# Patient Record
Sex: MALE | Race: WHITE | Employment: OTHER | URBAN - METROPOLITAN AREA
[De-identification: names, ages, dates, MRNs, and addresses within clinical notes are randomized per-mention and may not be internally consistent; named-entity substitution may affect disease eponyms.]

---

## 2018-12-12 ENCOUNTER — HOSPITAL ENCOUNTER (EMERGENCY)
Age: 73
Discharge: HOME | End: 2018-12-12
Attending: EMERGENCY MEDICINE
Payer: MEDICARE

## 2018-12-12 VITALS
BODY MASS INDEX: 32.55 KG/M2 | DIASTOLIC BLOOD PRESSURE: 75 MMHG | SYSTOLIC BLOOD PRESSURE: 136 MMHG | RESPIRATION RATE: 18 BRPM | TEMPERATURE: 99.6 F | WEIGHT: 240 LBS | OXYGEN SATURATION: 91 % | HEART RATE: 94 BPM

## 2018-12-12 DIAGNOSIS — R19.7 NAUSEA VOMITING AND DIARRHEA: Primary | ICD-10-CM

## 2018-12-12 DIAGNOSIS — R11.2 NAUSEA VOMITING AND DIARRHEA: Primary | ICD-10-CM

## 2018-12-12 LAB
ALBUMIN SERPL-MCNC: 3.9 G/DL (ref 3.5–5.2)
ALP BLD-CCNC: 93 U/L (ref 40–129)
ALT SERPL-CCNC: 39 U/L (ref 0–40)
ANION GAP SERPL CALCULATED.3IONS-SCNC: 16 MMOL/L (ref 7–16)
AST SERPL-CCNC: 41 U/L (ref 0–39)
BASOPHILS ABSOLUTE: 0 E9/L (ref 0–0.2)
BASOPHILS RELATIVE PERCENT: 0.3 % (ref 0–2)
BILIRUB SERPL-MCNC: 1.5 MG/DL (ref 0–1.2)
BUN BLDV-MCNC: 38 MG/DL (ref 8–23)
CALCIUM SERPL-MCNC: 9.5 MG/DL (ref 8.6–10.2)
CHLORIDE BLD-SCNC: 102 MMOL/L (ref 98–107)
CO2: 19 MMOL/L (ref 22–29)
CREAT SERPL-MCNC: 1.9 MG/DL (ref 0.7–1.2)
EOSINOPHILS ABSOLUTE: 0 E9/L (ref 0.05–0.5)
EOSINOPHILS RELATIVE PERCENT: 0.7 % (ref 0–6)
GFR AFRICAN AMERICAN: 42
GFR NON-AFRICAN AMERICAN: 35 ML/MIN/1.73
GLUCOSE BLD-MCNC: 132 MG/DL (ref 74–99)
HCT VFR BLD CALC: 54.7 % (ref 37–54)
HEMOGLOBIN: 18.4 G/DL (ref 12.5–16.5)
INFLUENZA A BY PCR: NOT DETECTED
INFLUENZA B BY PCR: NOT DETECTED
LACTIC ACID: 1.2 MMOL/L (ref 0.5–2.2)
LIPASE: 42 U/L (ref 13–60)
LYMPHOCYTES ABSOLUTE: 0.09 E9/L (ref 1.5–4)
LYMPHOCYTES RELATIVE PERCENT: 0.9 % (ref 20–42)
MCH RBC QN AUTO: 32.2 PG (ref 26–35)
MCHC RBC AUTO-ENTMCNC: 33.6 % (ref 32–34.5)
MCV RBC AUTO: 95.6 FL (ref 80–99.9)
MONOCYTES ABSOLUTE: 0.26 E9/L (ref 0.1–0.95)
MONOCYTES RELATIVE PERCENT: 2.6 % (ref 2–12)
NEUTROPHILS ABSOLUTE: 8.44 E9/L (ref 1.8–7.3)
NEUTROPHILS RELATIVE PERCENT: 96.5 % (ref 43–80)
PDW BLD-RTO: 13.9 FL (ref 11.5–15)
PLATELET # BLD: 116 E9/L (ref 130–450)
PMV BLD AUTO: 11.4 FL (ref 7–12)
POTASSIUM REFLEX MAGNESIUM: 4.3 MMOL/L (ref 3.5–5)
RBC # BLD: 5.72 E12/L (ref 3.8–5.8)
SODIUM BLD-SCNC: 137 MMOL/L (ref 132–146)
TOTAL PROTEIN: 7.9 G/DL (ref 6.4–8.3)
TROPONIN: 0.05 NG/ML (ref 0–0.03)
WBC # BLD: 8.7 E9/L (ref 4.5–11.5)

## 2018-12-12 PROCEDURE — 83605 ASSAY OF LACTIC ACID: CPT

## 2018-12-12 PROCEDURE — 96374 THER/PROPH/DIAG INJ IV PUSH: CPT

## 2018-12-12 PROCEDURE — 84484 ASSAY OF TROPONIN QUANT: CPT

## 2018-12-12 PROCEDURE — 2580000003 HC RX 258: Performed by: EMERGENCY MEDICINE

## 2018-12-12 PROCEDURE — 6360000002 HC RX W HCPCS: Performed by: EMERGENCY MEDICINE

## 2018-12-12 PROCEDURE — 83690 ASSAY OF LIPASE: CPT

## 2018-12-12 PROCEDURE — 6370000000 HC RX 637 (ALT 250 FOR IP): Performed by: EMERGENCY MEDICINE

## 2018-12-12 PROCEDURE — 85025 COMPLETE CBC W/AUTO DIFF WBC: CPT

## 2018-12-12 PROCEDURE — 80053 COMPREHEN METABOLIC PANEL: CPT

## 2018-12-12 PROCEDURE — 99284 EMERGENCY DEPT VISIT MOD MDM: CPT

## 2018-12-12 PROCEDURE — 36415 COLL VENOUS BLD VENIPUNCTURE: CPT

## 2018-12-12 PROCEDURE — 87502 INFLUENZA DNA AMP PROBE: CPT

## 2018-12-12 RX ORDER — ACETAMINOPHEN 500 MG
1000 TABLET ORAL ONCE
Status: COMPLETED | OUTPATIENT
Start: 2018-12-12 | End: 2018-12-12

## 2018-12-12 RX ORDER — ONDANSETRON 2 MG/ML
4 INJECTION INTRAMUSCULAR; INTRAVENOUS ONCE
Status: COMPLETED | OUTPATIENT
Start: 2018-12-12 | End: 2018-12-12

## 2018-12-12 RX ORDER — ONDANSETRON 4 MG/1
4 TABLET, ORALLY DISINTEGRATING ORAL EVERY 8 HOURS PRN
Qty: 10 TABLET | Refills: 0 | Status: SHIPPED | OUTPATIENT
Start: 2018-12-12 | End: 2021-06-24 | Stop reason: ALTCHOICE

## 2018-12-12 RX ORDER — 0.9 % SODIUM CHLORIDE 0.9 %
1000 INTRAVENOUS SOLUTION INTRAVENOUS ONCE
Status: COMPLETED | OUTPATIENT
Start: 2018-12-12 | End: 2018-12-12

## 2018-12-12 RX ADMIN — ONDANSETRON 4 MG: 2 INJECTION INTRAMUSCULAR; INTRAVENOUS at 21:22

## 2018-12-12 RX ADMIN — ACETAMINOPHEN 1000 MG: 500 TABLET, FILM COATED ORAL at 21:22

## 2018-12-12 RX ADMIN — LIDOCAINE HYDROCHLORIDE: 20 SOLUTION ORAL; TOPICAL at 21:53

## 2018-12-12 RX ADMIN — SODIUM CHLORIDE 1000 ML: 9 INJECTION, SOLUTION INTRAVENOUS at 21:22

## 2018-12-12 ASSESSMENT — ENCOUNTER SYMPTOMS
DIARRHEA: 1
SINUS PRESSURE: 0
SINUS PAIN: 0
CONSTIPATION: 0
NAUSEA: 1
SHORTNESS OF BREATH: 0
BACK PAIN: 0
SORE THROAT: 0
ABDOMINAL PAIN: 1
VOMITING: 1
COUGH: 0

## 2018-12-12 ASSESSMENT — PAIN SCALES - GENERAL: PAINLEVEL_OUTOF10: 6

## 2018-12-13 NOTE — ED PROVIDER NOTES
Patient is a 66-year-old male presenting to the emergency department for crampy abdominal pain, nausea, vomiting, and diarrhea. He states that the symptoms all began today. He and his wife both have the same symptoms. They're unsure if there has been any suspicious food intake. He denies any fever but does state that he has had some chills and generalized body aches that the day today. He denies any changes in urination. He denies any cough, shortness of breath, or chest pain. He denies any runny nose, congestion, or sore throat. The history is provided by the patient. Review of Systems   Constitutional: Positive for chills and fatigue. Negative for fever. HENT: Negative for congestion, postnasal drip, sinus pain, sinus pressure and sore throat. Respiratory: Negative for cough and shortness of breath. Cardiovascular: Negative for chest pain and palpitations. Gastrointestinal: Positive for abdominal pain, diarrhea, nausea and vomiting. Negative for constipation. Genitourinary: Negative for decreased urine volume, dysuria and hematuria. Musculoskeletal: Negative for back pain and neck pain. Skin: Negative for pallor, rash and wound. Neurological: Negative for dizziness, syncope, weakness, light-headedness, numbness and headaches. Physical Exam   Constitutional: He is oriented to person, place, and time. He appears well-developed and well-nourished. No distress. HENT:   Head: Normocephalic and atraumatic. Mouth/Throat: Oropharynx is clear and moist. No oropharyngeal exudate. Eyes: Pupils are equal, round, and reactive to light. Conjunctivae and EOM are normal.   Neck: Normal range of motion. Neck supple. Cardiovascular: Normal rate, regular rhythm and normal heart sounds. Exam reveals no gallop and no friction rub. No murmur heard. Pulmonary/Chest: Effort normal and breath sounds normal. No respiratory distress. He has no wheezes. He has no rales. Abdominal: Soft. Bowel sounds are normal. There is tenderness. There is no rebound and no guarding. Generalized mild abdominal tenderness to palpation without localization. No guarding, rebound, or rigidity. Hyperactiveactive bowel sounds in all 4 quadrants. Musculoskeletal: He exhibits no edema, tenderness or deformity. Neurological: He is alert and oriented to person, place, and time. Skin: Skin is warm and dry. No rash noted. He is not diaphoretic. No erythema. No pallor. Nursing note and vitals reviewed. Procedures    MDM  Number of Diagnoses or Management Options  Diagnosis management comments: Patient presents with crampy abdominal pain, nausea, vomiting, and diarrhea beginning today. He has wife had the same symptoms but is likely that this is an infectious gastroenteritis. Patient is afebrile and hemodynamically stable. Patient does not necessitate abdominal imaging at this time. He will have baseline blood work drawn and be given IV fluid hydration, Zofran, and Tylenol. ED Course as of Dec 12 2215   Wed Dec 12, 2018   2214 Patient reassessed. He states that he feels significantly better compared to arrival. He has had no vomiting or diarrhea since arrival. Explained his blood work is reassuring other than a slight elevation in his creatinine which is likely secondary to volume depletion. Patient did receive 1 L normal saline. Stressed the importance of taking in plenty of fluids by mouth to keep up with the losses from vomiting and diarrhea. Patient will be discharged with Zofran and instructed him on the warning signs and symptoms for which she should immediately return to emergency apparent. He voices an understanding and is agreeable with this plan.  He is in no acute distress, has no abdominal pain whatsoever at this time, and is hemodynamically stable for discharge.  [BM]      ED Course User Index  [BM] Ofilia Severance, DO       --------------------------------------------- PAST HISTORY process requiring hospitalization or inpatient management. They have remained hemodynamically stable throughout their entire ED visit and are stable for discharge with outpatient follow-up. The plan has been discussed in detail and they are aware of the specific conditions for emergent return, as well as the importance of follow-up. New Prescriptions    ONDANSETRON (ZOFRAN ODT) 4 MG DISINTEGRATING TABLET    Take 1 tablet by mouth every 8 hours as needed for Nausea or Vomiting       Diagnosis:  1. Nausea vomiting and diarrhea        Disposition:  Patient's disposition: Discharge to home  Patient's condition is stable. ATTENDING PROVIDER ATTESTATION:     Ernesto Silva presented to the emergency department for evaluation of Influenza (started today with N/V/D)    I have reviewed and discussed the case, including pertinent history (medical, surgical, family and social) and exam findings with the Resident and the Nurse assigned to Ernesto Silva. I have personally performed and/or participated in the history, exam, medical decision making, and procedures and agree with all pertinent clinical information. I have reviewed my findings and recommendations with Ernesto Silva and members of family present at the time of disposition. MDM: Supportive care, will obtain appropriate labs and imaging to assess patient's Influenza (started today with N/V/D)       My findings/plan: There were no encounter diagnoses.   New Prescriptions    No medications on file     Linda Russo, [address] [facility],   Resident  12/12/18 [id number]

## 2020-03-03 VITALS
WEIGHT: 247.12 LBS | BODY MASS INDEX: 34.6 KG/M2 | HEIGHT: 71 IN | SYSTOLIC BLOOD PRESSURE: 144 MMHG | HEART RATE: 64 BPM | DIASTOLIC BLOOD PRESSURE: 80 MMHG

## 2020-03-03 RX ORDER — PROBENECID 500 MG/1
500 TABLET, FILM COATED ORAL DAILY
COMMUNITY
End: 2020-10-23

## 2020-03-03 RX ORDER — HYDROCHLOROTHIAZIDE 12.5 MG/1
12.5 CAPSULE, GELATIN COATED ORAL DAILY
Status: ON HOLD | COMMUNITY
End: 2022-02-08 | Stop reason: HOSPADM

## 2020-09-15 RX ORDER — CLOPIDOGREL BISULFATE 75 MG/1
75 TABLET ORAL DAILY
Qty: 90 TABLET | Refills: 3 | Status: ON HOLD
Start: 2020-09-15 | End: 2021-06-30 | Stop reason: HOSPADM

## 2020-10-22 NOTE — PROGRESS NOTES
[facility] Cardiology Progress Note  Dr. Jose Roldan      Referring Physician: Philip Wilson DO  CHIEF COMPLAINT:   Chief Complaint   Patient presents with    Coronary Artery Disease     annual follow-up. Patient denies any cp sob or dizziness. HISTORY OF PRESENT ILLNESS:   Patient  is 76years old male with PCI to LAD, HTN, hyperlipidemia, thyroid disease, and pulmonary embolism after back surgery is here for follow up appointment. Patient denies any chest pain, no shortness of breath, no lightheadedness, no dizziness, no palpitations, no pedal edema, no PND, no orthopnea, no syncope, no presyncopal episodes.   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       Past Medical History:   Diagnosis Date    Bilateral renal cysts 10/13/2014    CAD (coronary artery disease)     Cardiomyopathy ([facility] [address]     Chest pain     8-4-2016 lexiscan stress    History of gout 10/13/2014    History of pulmonary embolism 10/13/2014    Hyperlipidemia     Hypertension     Hyperthyroidism 10/13/2014    NSTEMI (non-ST elevated myocardial infarction) ([address] 10/13/2014    Obesity due to excess calories     Thyroid cyst 10/13/2014    Thyroid disease          Past Surgical History:   Procedure Laterality Date    BACK SURGERY      CORONARY ANGIOPLASTY WITH STENT PLACEMENT  10-    Dr. Levi Rodgers 3.0x18 MID LAD    DIAGNOSTIC CARDIAC CATH LAB PROCEDURE  10/13/2014    Dr. Faviola Vega: PCI to LAD         Current Outpatient Medications   Medication Sig Dispense Refill    colchicine-probenecid 0.5-500 MG per tablet TAKE ONE TABLET BY MOUTH EVERY DAY      clopidogrel (PLAVIX) 75 MG tablet Take 1 tablet by mouth daily 90 tablet 3    hydrochlorothiazide (MICROZIDE) 12.5 MG capsule Take 12.5 mg by mouth daily      ondansetron (ZOFRAN ODT) 4 MG disintegrating tablet Take 1 tablet by mouth every 8 hours as needed for Nausea or Vomiting 10 tablet 0    lisinopril (PRINIVIL;ZESTRIL) 20 MG tablet Take 1 tablet by mouth daily 30 tablet 3    metoprolol succinate ER (TOPROL-XL) 100 MG XL tablet Take 100 mg by mouth daily      nitroGLYCERIN (NITROSTAT) 0.4 MG SL tablet Place 1 tablet under the tongue every 5 minutes as needed for Chest pain. 25 tablet 3    atorvastatin (LIPITOR) 80 MG tablet Take 1 tablet by mouth daily. (Patient taking differently: Take 80 mg by mouth nightly ) 30 tablet 3    methimazole (TAPAZOLE) 5 MG tablet Take 7.5 mg by mouth daily. 1.5 tabs      allopurinol (ZYLOPRIM) 300 MG tablet Take 300 mg by mouth daily. No current facility-administered medications for this visit.           Allergies as of 10/23/2020    (No Known Allergies)       Social History     Socioeconomic History    Marital status:      Spouse name: Not on file    Number of children: Not on file    Years of education: Not on file    Highest education level: Not on file   Occupational History    Not on file   Social Needs    Financial resource strain: Not on file    Food insecurity     Worry: Not on file     Inability: Not on file    Transportation needs     Medical: Not on file     Non-medical: Not on file   Tobacco Use    Smoking status: Never Smoker    Smokeless tobacco: Never Used   Substance and Sexual Activity    Alcohol use: Yes     Comment: occasional. No caffeine    Drug use: No    Sexual activity: Not on file   Lifestyle    Physical activity     Days per week: Not on file     Minutes per session: Not on file    Stress: Not on file   Relationships    Social connections     Talks on phone: Not on file     Gets together: Not on file     Attends Worship service: Not on file     Active member of club or organization: Not on file     Attends meetings of clubs or organizations: Not on file     Relationship status: Not on file    Intimate partner violence     Fear of current or ex partner: Not on file     Emotionally abused: Not on file     Physically abused: Not on file     Forced sexual activity: Not on file   Other Topics Concern    Not on file   Social History Narrative    Not on file       Family History   Problem Relation Age of Onset    Heart Disease Mother         cabg 3    Heart Disease Father         pacemaker       REVIEW OF SYSTEMS:     CONSTITUTIONAL:  negative for  fevers, chills, sweats and fatigue  HEENT:  negative for  tinnitus, earaches, nasal congestion and epistaxis  RESPIRATORY:  negative for  dry cough, cough with sputum, dyspnea, wheezing and hemoptysis  GASTROINTESTINAL:  negative for nausea, vomiting, diarrhea, constipation, pruritus and jaundice  HEMATOLOGIC/LYMPHATIC:  negative for easy bruising, bleeding, lymphadenopathy and petechiae  ENDOCRINE:  negative for heat intolerance, cold intolerance, tremor, hair loss and diabetic symptoms including neither polyuria nor polydipsia nor blurred vision  MUSCULOSKELETAL:  negative for  myalgias, arthralgias, joint swelling, stiff joints and decreased range of motion  NEUROLOGICAL:  negative for memory problems, speech problems, visual disturbance, dysphagia, weakness and numbness      PHYSICAL EXAM:   Constitutional:  Awake, alert cooperative, no apparent distress, and appears stated age. HEENT:  Moist and pink mucous membranes, normocephalic, without obvious abnormality, atraumatic, normal ears and nose. Neck:  Supple, symmetrical, trachea midline, no JVD, no adenopathy, thyroid symmetric, not enlarged and no tenderness, good carotid upstroke bilaterally, no carotid bruit, skin normal  Lungs: No increased work of breathing, good air exchange, clear to auscultation bilaterally, no crackles or wheezing. Cardiovascular: Normal apical impulse, regular rate and rhythm, normal S1 and S2, no S3 or S4, no murmur, no pedal edema, good carotid upstroke bilaterally, no carotid bruit, no JVD, no abdominal pulsating masses. Abdomen: Soft, nontender, no hepatomegaly, no splenomegaly, bowel sound positive.    CHEST:  Expands symmetrically, nontender to palpation. Musculoskeletal:  No clubbing or cyanosis. No redness, warmth, or swelling of the joints. Neurological: Alert, awake, and oriented X3. /70 (Site: Right Upper Arm, Position: Sitting, Cuff Size: Medium Adult)   Pulse 67   Ht 5' 11\" (1.803 m)   Wt 248 lb (112.5 kg)   BMI 34.59 kg/m²     DATA:   I personally reviewed the visit EKG with the following interpretation: Sinus rhythm, nonspecific ST changes in lateral leads    EKG - (9/19/2019) I personally reviewed the EKG with the following interpretation: Sinus bradycardia, first-degree AV block    ECHO: 11/16/15 Normal left ventricular systolic function Stage I diastolic dysfunction EF 64% Mild mitral regurgitation Trace tricuspid regurgitation    Stress Test: 8/4/16   Technique:    The patient received 12.97 mCi of Cardiolite for the resting study,    followed by standard SPECT imaging . The patient was stressed    pharmacologically. Then, 35.1 mCi of Cardiolite was injected with    repeat comparison imaging.         Findings:    Stress induced ischemia is not demonstrated. Angiography: 10/13/14 IMPRESSION:   1. Subtotal occlusion of mid LAD with successful balloon angioplasty and      deployment of 3.0 x 18 mm Expedition drug-eluting stent with 0% residual      stenosis and mild pinching of the ostium of the first diagonal branch      estimated at 20% to 30%.    2.    Successful deployment of 8-Chinese Angio-Seal in the right common      femoral artery  Cardiology Labs: BMP:    Lab Results   Component Value Date     12/12/2018    K 4.3 12/12/2018     12/12/2018    CO2 19 12/12/2018    BUN 38 12/12/2018    CREATININE 1.9 12/12/2018     CMP:    Lab Results   Component Value Date     12/12/2018    K 4.3 12/12/2018     12/12/2018    CO2 19 12/12/2018    BUN 38 12/12/2018    CREATININE 1.9 12/12/2018    PROT 7.9 12/12/2018     CBC:    Lab Results   Component Value Date    WBC 8.7 12/12/2018    RBC 5.72 12/12/2018    HGB 18.4 12/12/2018    HCT 54.7 12/12/2018    MCV 95.6 12/12/2018    RDW 13.9 12/12/2018     12/12/2018     PT/INR:  No results found for: PTINR  PT/INR Warfarin:  No components found for: PTPATWAR, PTINRWAR  PTT:    Lab Results   Component Value Date    APTT 30.2 08/03/2016     PTT Heparin:  No components found for: APTTHEP  Magnesium:    Lab Results   Component Value Date    MG 1.8 10/14/2014     TSH:    Lab Results   Component Value Date    TSH 2.570 08/04/2016     TROPONIN:  No components found for: TROP  BNP:  No results found for: BNP  FASTING LIPID PANEL:    Lab Results   Component Value Date    CHOL 168 08/04/2016    HDL 48 08/04/2016    TRIG 187 08/04/2016     No orders to display     I have personally reviewed the laboratory, cardiac diagnostic and radiographic testing as outlined above:      IMPRESSION:  1: CAD: Status post-PCI to LAD in October 2014 using a 3.0×18 mm drug-eluting stent, doing fine and will continue current treatment. 2: Essential (primary) hypertension: Controlled, continue current medications. 3:  Chronic kidney disease, stage 3 (moderate)                4:  Hyperlipidemia: On Statin                  5:  Personal history of pulmonary embolism                RECOMMENDATIONS:   1. Continue current treatment  2. Preventive cardiology: Low-salt, low-cholesterol diet, daily exercise, total cholesterol of less than 200, LDL of less than 70,were all advised. 3.  Follow-up with Dr. Patrick Hernandez as scheduled  4. Follow-up with Dr. Ailyn David in 1 year, sooner if symptomatic for any reason    I have reviewed my findings and recommendations with patient    Electronically signed by David Morales MD on 10/23/2020 at 5:31 PM    NOTE: This report was transcribed using voice recognition software.  Every effort was made to ensure accuracy; however, inadvertent computerized transcription errors may be present

## 2020-10-23 ENCOUNTER — OFFICE VISIT (OUTPATIENT)
Dept: CARDIOLOGY CLINIC | Age: 75
End: 2020-10-23
Payer: MEDICARE

## 2020-10-23 VITALS
BODY MASS INDEX: 34.72 KG/M2 | DIASTOLIC BLOOD PRESSURE: 70 MMHG | HEIGHT: 71 IN | HEART RATE: 67 BPM | WEIGHT: 248 LBS | SYSTOLIC BLOOD PRESSURE: 124 MMHG

## 2020-10-23 PROCEDURE — 99214 OFFICE O/P EST MOD 30 MIN: CPT | Performed by: INTERNAL MEDICINE

## 2020-10-23 PROCEDURE — 93000 ELECTROCARDIOGRAM COMPLETE: CPT | Performed by: INTERNAL MEDICINE

## 2020-10-23 RX ORDER — PROBENECID AND COLCHICINE 500; .5 MG/1; MG/1
TABLET ORAL
COMMUNITY
Start: 2020-10-07

## 2021-06-24 ENCOUNTER — APPOINTMENT (OUTPATIENT)
Dept: CT IMAGING | Age: 76
DRG: 026 | End: 2021-06-24
Payer: MEDICARE

## 2021-06-24 ENCOUNTER — HOSPITAL ENCOUNTER (INPATIENT)
Age: 76
LOS: 6 days | Discharge: HOME HEALTH | DRG: 026 | End: 2021-06-30
Attending: EMERGENCY MEDICINE | Admitting: SURGERY
Payer: MEDICARE

## 2021-06-24 ENCOUNTER — APPOINTMENT (OUTPATIENT)
Dept: GENERAL RADIOLOGY | Age: 76
End: 2021-06-24
Payer: MEDICARE

## 2021-06-24 ENCOUNTER — APPOINTMENT (OUTPATIENT)
Dept: CT IMAGING | Age: 76
End: 2021-06-24
Payer: MEDICARE

## 2021-06-24 ENCOUNTER — ANESTHESIA EVENT (OUTPATIENT)
Dept: OPERATING ROOM | Age: 76
DRG: 026 | End: 2021-06-24
Payer: MEDICARE

## 2021-06-24 ENCOUNTER — HOSPITAL ENCOUNTER (EMERGENCY)
Age: 76
Discharge: ANOTHER HOSPITAL | End: 2021-06-24
Attending: EMERGENCY MEDICINE
Payer: MEDICARE

## 2021-06-24 VITALS
RESPIRATION RATE: 16 BRPM | WEIGHT: 248 LBS | TEMPERATURE: 98.4 F | SYSTOLIC BLOOD PRESSURE: 112 MMHG | DIASTOLIC BLOOD PRESSURE: 75 MMHG | BODY MASS INDEX: 33.59 KG/M2 | HEIGHT: 72 IN | OXYGEN SATURATION: 95 % | HEART RATE: 77 BPM

## 2021-06-24 DIAGNOSIS — I62.03 ACUTE ON CHRONIC INTRACRANIAL SUBDURAL HEMATOMA (HCC): Primary | ICD-10-CM

## 2021-06-24 DIAGNOSIS — I62.01 ACUTE ON CHRONIC INTRACRANIAL SUBDURAL HEMATOMA (HCC): Primary | ICD-10-CM

## 2021-06-24 DIAGNOSIS — S06.5XAA SDH (SUBDURAL HEMATOMA): Primary | ICD-10-CM
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
## 2021-06-24 LAB
% INHIBITION AA: 55.7 %
% INHIBITION ADP: 78 %
ALBUMIN SERPL-MCNC: 3.4 G/DL (ref 3.5–5.2)
ALP BLD-CCNC: 85 U/L (ref 40–129)
ALT SERPL-CCNC: 20 U/L (ref 0–40)
ANGLE (CLOT STRENGTH): 72.7 DEGREE (ref 59–74)
ANION GAP SERPL CALCULATED.3IONS-SCNC: 13 MMOL/L (ref 7–16)
APTT: 29.2 SEC (ref 24.5–35.1)
AST SERPL-CCNC: 25 U/L (ref 0–39)
BACTERIA: ABNORMAL /HPF
BASOPHILS ABSOLUTE: 0.02 E9/L (ref 0–0.2)
BASOPHILS RELATIVE PERCENT: 0.3 % (ref 0–2)
BILIRUB SERPL-MCNC: 1.2 MG/DL (ref 0–1.2)
BILIRUBIN URINE: NEGATIVE
BLOOD, URINE: ABNORMAL
BUN BLDV-MCNC: 33 MG/DL (ref 6–23)
CALCIUM SERPL-MCNC: 9.6 MG/DL (ref 8.6–10.2)
CHLORIDE BLD-SCNC: 102 MMOL/L (ref 98–107)
CLARITY: CLEAR
CO2: 23 MMOL/L (ref 22–29)
COLOR: ABNORMAL
CREAT SERPL-MCNC: 1.7 MG/DL (ref 0.7–1.2)
EOSINOPHILS ABSOLUTE: 0.14 E9/L (ref 0.05–0.5)
EOSINOPHILS RELATIVE PERCENT: 1.8 % (ref 0–6)
EPL-TEG: 0.8 % (ref 0–15)
G-TEG: 9.8 K D/SC (ref 4.5–11)
GFR AFRICAN AMERICAN: 48
GFR NON-AFRICAN AMERICAN: 39 ML/MIN/1.73
GLUCOSE BLD-MCNC: 99 MG/DL (ref 74–99)
GLUCOSE URINE: NEGATIVE MG/DL
HCT VFR BLD CALC: 49.7 % (ref 37–54)
HEMOGLOBIN: 16.6 G/DL (ref 12.5–16.5)
IMMATURE GRANULOCYTES #: 0.05 E9/L
IMMATURE GRANULOCYTES %: 0.7 % (ref 0–5)
INR BLD: 1.3
K (CLOTTING TIME): 1.2 MIN (ref 1–3)
KETONES, URINE: NEGATIVE MG/DL
LACTIC ACID: 0.9 MMOL/L (ref 0.5–2.2)
LEUKOCYTE ESTERASE, URINE: NEGATIVE
LY30 (FIBRINOLYSIS): 0.8 % (ref 0–8)
LYMPHOCYTES ABSOLUTE: 0.66 E9/L (ref 1.5–4)
LYMPHOCYTES RELATIVE PERCENT: 8.6 % (ref 20–42)
MA (MAX AMPLITUDE): 66.3 MM (ref 50–70)
MA-AA: 38 MM
MA-ACTIVATED: 15.5 MM
MA-ADP: 26.7 MM
MA-TEG BASELINE: 66.3 MM
MCH RBC QN AUTO: 32.3 PG (ref 26–35)
MCHC RBC AUTO-ENTMCNC: 33.4 % (ref 32–34.5)
MCV RBC AUTO: 96.7 FL (ref 80–99.9)
MONOCYTES ABSOLUTE: 0.82 E9/L (ref 0.1–0.95)
MONOCYTES RELATIVE PERCENT: 10.7 % (ref 2–12)
NEUTROPHILS ABSOLUTE: 5.99 E9/L (ref 1.8–7.3)
NEUTROPHILS RELATIVE PERCENT: 77.9 % (ref 43–80)
NITRITE, URINE: NEGATIVE
PDW BLD-RTO: 13.6 FL (ref 11.5–15)
PH UA: 5.5 (ref 5–9)
PLATELET # BLD: 142 E9/L (ref 130–450)
PMV BLD AUTO: 10.9 FL (ref 7–12)
POTASSIUM REFLEX MAGNESIUM: 4 MMOL/L (ref 3.5–5)
PROTEIN UA: 100 MG/DL
PROTHROMBIN TIME: 14.8 SEC (ref 9.3–12.4)
R (REACTION TIME): 3.6 MIN (ref 5–10)
RBC # BLD: 5.14 E12/L (ref 3.8–5.8)
RBC UA: ABNORMAL /HPF (ref 0–2)
SARS-COV-2, NAAT: NOT DETECTED
SODIUM BLD-SCNC: 138 MMOL/L (ref 132–146)
SPECIFIC GRAVITY UA: 1.02 (ref 1–1.03)
TOTAL PROTEIN: 6.8 G/DL (ref 6.4–8.3)
TROPONIN, HIGH SENSITIVITY: 99 NG/L (ref 0–11)
UROBILINOGEN, URINE: 1 E.U./DL
WBC # BLD: 7.7 E9/L (ref 4.5–11.5)
WBC UA: ABNORMAL /HPF (ref 0–5)

## 2021-06-24 PROCEDURE — 70450 CT HEAD/BRAIN W/O DYE: CPT

## 2021-06-24 PROCEDURE — 85347 COAGULATION TIME ACTIVATED: CPT

## 2021-06-24 PROCEDURE — 87088 URINE BACTERIA CULTURE: CPT

## 2021-06-24 PROCEDURE — 85576 BLOOD PLATELET AGGREGATION: CPT

## 2021-06-24 PROCEDURE — 36415 COLL VENOUS BLD VENIPUNCTURE: CPT

## 2021-06-24 PROCEDURE — 85025 COMPLETE CBC W/AUTO DIFF WBC: CPT

## 2021-06-24 PROCEDURE — 99284 EMERGENCY DEPT VISIT MOD MDM: CPT

## 2021-06-24 PROCEDURE — 84484 ASSAY OF TROPONIN QUANT: CPT

## 2021-06-24 PROCEDURE — 81001 URINALYSIS AUTO W/SCOPE: CPT

## 2021-06-24 PROCEDURE — 85730 THROMBOPLASTIN TIME PARTIAL: CPT

## 2021-06-24 PROCEDURE — 2000000000 HC ICU R&B

## 2021-06-24 PROCEDURE — 71046 X-RAY EXAM CHEST 2 VIEWS: CPT

## 2021-06-24 PROCEDURE — 87635 SARS-COV-2 COVID-19 AMP PRB: CPT

## 2021-06-24 PROCEDURE — 93005 ELECTROCARDIOGRAM TRACING: CPT | Performed by: STUDENT IN AN ORGANIZED HEALTH CARE EDUCATION/TRAINING PROGRAM

## 2021-06-24 PROCEDURE — 99283 EMERGENCY DEPT VISIT LOW MDM: CPT

## 2021-06-24 PROCEDURE — 85610 PROTHROMBIN TIME: CPT

## 2021-06-24 PROCEDURE — 80053 COMPREHEN METABOLIC PANEL: CPT

## 2021-06-24 PROCEDURE — 99291 CRITICAL CARE FIRST HOUR: CPT | Performed by: SURGERY

## 2021-06-24 PROCEDURE — 96365 THER/PROPH/DIAG IV INF INIT: CPT

## 2021-06-24 PROCEDURE — 2580000003 HC RX 258: Performed by: STUDENT IN AN ORGANIZED HEALTH CARE EDUCATION/TRAINING PROGRAM

## 2021-06-24 PROCEDURE — 87040 BLOOD CULTURE FOR BACTERIA: CPT

## 2021-06-24 PROCEDURE — 83605 ASSAY OF LACTIC ACID: CPT

## 2021-06-24 PROCEDURE — 6360000002 HC RX W HCPCS: Performed by: PHYSICIAN ASSISTANT

## 2021-06-24 PROCEDURE — 85384 FIBRINOGEN ACTIVITY: CPT

## 2021-06-24 RX ORDER — LEVETIRACETAM 5 MG/ML
500 INJECTION INTRAVASCULAR ONCE
Status: COMPLETED | OUTPATIENT
Start: 2021-06-24 | End: 2021-06-24

## 2021-06-24 RX ORDER — ALLOPURINOL 300 MG/1
300 TABLET ORAL DAILY
Status: DISCONTINUED | OUTPATIENT
Start: 2021-06-25 | End: 2021-06-30 | Stop reason: HOSPADM

## 2021-06-24 RX ORDER — LISINOPRIL 20 MG/1
20 TABLET ORAL DAILY
Status: ON HOLD | COMMUNITY
End: 2022-02-12 | Stop reason: HOSPADM

## 2021-06-24 RX ORDER — METHIMAZOLE 5 MG/1
7.5 TABLET ORAL DAILY
Status: DISCONTINUED | OUTPATIENT
Start: 2021-06-25 | End: 2021-06-30 | Stop reason: HOSPADM

## 2021-06-24 RX ORDER — LISINOPRIL 10 MG/1
10 TABLET ORAL DAILY
Status: CANCELLED | OUTPATIENT
Start: 2021-06-24

## 2021-06-24 RX ORDER — DIPHENHYDRAMINE HCL 50 MG
100 CAPSULE ORAL NIGHTLY PRN
COMMUNITY
End: 2021-07-22

## 2021-06-24 RX ORDER — ATORVASTATIN CALCIUM 20 MG/1
20 TABLET, FILM COATED ORAL NIGHTLY
Status: ON HOLD | COMMUNITY
End: 2021-09-06

## 2021-06-24 RX ORDER — 0.9 % SODIUM CHLORIDE 0.9 %
1000 INTRAVENOUS SOLUTION INTRAVENOUS ONCE
Status: COMPLETED | OUTPATIENT
Start: 2021-06-24 | End: 2021-06-24

## 2021-06-24 RX ORDER — PROBENECID AND COLCHICINE 500; .5 MG/1; MG/1
1 TABLET ORAL DAILY
Status: DISCONTINUED | OUTPATIENT
Start: 2021-06-25 | End: 2021-06-25 | Stop reason: ALTCHOICE

## 2021-06-24 RX ADMIN — LEVETIRACETAM 500 MG: 5 INJECTION INTRAVENOUS at 19:52

## 2021-06-24 RX ADMIN — SODIUM CHLORIDE 1000 ML: 9 INJECTION, SOLUTION INTRAVENOUS at 14:47

## 2021-06-24 ASSESSMENT — PAIN DESCRIPTION - PAIN TYPE: TYPE: ACUTE PAIN

## 2021-06-24 ASSESSMENT — PAIN SCALES - GENERAL: PAINLEVEL_OUTOF10: 4

## 2021-06-24 ASSESSMENT — PAIN DESCRIPTION - LOCATION: LOCATION: BACK

## 2021-06-24 ASSESSMENT — PAIN DESCRIPTION - DESCRIPTORS: DESCRIPTORS: HEAVINESS

## 2021-06-24 NOTE — ED PROVIDER NOTES
ED Attending: CC: [id number] [id number]  Department of Emergency Medicine   ED  Encounter Note  Admit Date/RoomTime: 2021  5:54 PM  ED Room: 3822/3822A    NAME: Yi Darling  : 1945  MRN: [id number]     Chief Complaint:  Trauma (Consult from [facility] for SDH)    History of Present Illness         Yi Darling is a 76 y.o. old male who presents to the emergency department by hospital transport from [facility], for 2 head injuries which occured 2 weeks ago and then again on . He did not seek any medical attention until today. He has been dizzy and off balance ever since 2 weeks ago when he fell and struck his head. When he presented to the emergency department at [facility] scan imaging was performed and the images are reflected below. He was transferred to our facility for trauma/neurosurgical evaluation. He does take Plavix for coronary artery disease. His complaint is a mild headache on the right side of his forehead and a numbness sensation to the lower lip on the left. Since onset the symptoms have been mild in degree. He takes Plavix. The patients tetanus status is up to date. ROS   Pertinent positives and negatives are stated within HPI, all other systems reviewed and are negative. Past Medical History:  has a past medical history of Bilateral renal cysts, CAD (coronary artery disease), Cardiomyopathy ([address] Chest pain, History of gout, History of pulmonary embolism, Hyperlipidemia, Hypertension, Hyperthyroidism, NSTEMI (non-ST elevated myocardial infarction) ([address] Obesity due to excess calories, Thyroid cyst, and Thyroid disease. Surgical History:  has a past surgical history that includes back surgery; Coronary angioplasty with stent (10-); Diagnostic Cardiac Cath Lab Procedure (10/13/2014); and craniotomy (Right, 2021). Social History:  reports that he has never smoked.  He has never used smokeless tobacco. He reports current alcohol use. He reports that he does not use drugs. Family History: family history includes Heart Disease in his father and mother. Allergies: Patient has no known allergies. Physical Exam   Oxygen Saturation Interpretation: Normal.        ED Triage Vitals [06/24/21 1800]   BP Temp Temp Source Pulse Resp SpO2 Height Weight   133/86 97.5 °F (36.4 °C) Temporal 72 16 98 % -- --         Constitutional: Level of Consciousness: Awake and alert, cooperative. ETOH: No.               Distress: none. Head:  Traumatic:  no.                  Scalp Tenderness:  none. Deformity: no.               Skin:  normal.  Eyes:  PERRL, EOMI. No periorbital ecchymoses. Conjunctiva: normal.  Ears:  External ears without lesions. Throat:  Airway patient. Neck:  Supple. No midline tenderness, full ROM. Chest:  Symmetrical without visible rash or tenderness. Respiratory:  Clear to auscultation and breath sounds equal.  CV:  Regular rate and rhythm, normal heart sounds, without pathological murmurs. GI:  Abdomen Soft, nontender. No abrasions, ecchymoses, or lacerations. Back:  No costovertebral, paravertebral, intervertebral, or vertebral tenderness or spasm. Integument:  There are healing abrasions to both shins. Extremities  No tenderness or swelling. Normal, painless range of motion. No neurovascular deficit. Neurological:  Oriented x 3,  GCS15. Motor functions intact.      Lab / Imaging Results   (All laboratory and radiology results have been personally reviewed by myself)  Labs:  Results for orders placed or performed during the hospital encounter of 06/24/21   Protime-INR   Result Value Ref Range    Protime 14.8 (H) 9.3 - 12.4 sec    INR 1.3    APTT   Result Value Ref Range    aPTT 29.2 24.5 - 35.1 sec   TEG lab test   Result Value Ref Range    R (Reaction Time) 3.6 (L) 5.0 - 10.0 min    K (Clotting Time) 1.2 1.0 - 3.0 min    Angle (Clot Strength) 72.7 59.0 - 74.0 degree MA (Max Amplitude) 66.3 50.0 - 70.0 mm    G-TEG 9.8 4.5 - 11.0 K d/sc    EPL-TEG 0.8 0.0 - 15.0 %    LY30 (Fibrinolysis) 0.8 0.0 - 8.0 %   PLATELET MAPPING   Result Value Ref Range    MA-TEG Baseline 66.3 mm    MA-Activated 15.5 mm    MA-ADP 26.7 mm    MA-AA 38.0 mm    % Inhibition ADP 78.0 %    % Inhibition AA 55.7 %   Basic Metabolic Panel w/ Reflex to MG   Result Value Ref Range    Sodium 135 132 - 146 mmol/L    Potassium reflex Magnesium 4.0 3.5 - 5.0 mmol/L    Chloride 103 98 - 107 mmol/L    CO2 21 (L) 22 - 29 mmol/L    Anion Gap 11 7 - 16 mmol/L    Glucose 87 74 - 99 mg/dL    BUN 29 (H) 6 - 23 mg/dL    CREATININE 1.3 (H) 0.7 - 1.2 mg/dL    GFR Non-African American 54 >=60 mL/min/1.73    GFR African American >60     Calcium 9.4 8.6 - 10.2 mg/dL   CBC   Result Value Ref Range    WBC 6.0 4.5 - 11.5 E9/L    RBC 4.92 3.80 - 5.80 E12/L    Hemoglobin 15.8 12.5 - 16.5 g/dL    Hematocrit 48.5 37.0 - 54.0 %    MCV 98.6 80.0 - 99.9 fL    MCH 32.1 26.0 - 35.0 pg    MCHC 32.6 32.0 - 34.5 %    RDW 13.8 11.5 - 15.0 fL    Platelets 463 (L) 063 - 450 E9/L    MPV 10.8 7.0 - 12.0 fL   Basic Metabolic Panel w/ Reflex to MG   Result Value Ref Range    Sodium 137 132 - 146 mmol/L    Potassium reflex Magnesium 4.3 3.5 - 5.0 mmol/L    Chloride 105 98 - 107 mmol/L    CO2 19 (L) 22 - 29 mmol/L    Anion Gap 13 7 - 16 mmol/L    Glucose 103 (H) 74 - 99 mg/dL    BUN 27 (H) 6 - 23 mg/dL    CREATININE 1.3 (H) 0.7 - 1.2 mg/dL    GFR Non-African American 54 >=60 mL/min/1.73    GFR African American >60     Calcium 8.9 8.6 - 10.2 mg/dL   CBC   Result Value Ref Range    WBC 5.9 4.5 - 11.5 E9/L    RBC 4.73 3.80 - 5.80 E12/L    Hemoglobin 15.2 12.5 - 16.5 g/dL    Hematocrit 46.6 37.0 - 54.0 %    MCV 98.5 80.0 - 99.9 fL    MCH 32.1 26.0 - 35.0 pg    MCHC 32.6 32.0 - 34.5 %    RDW 13.8 11.5 - 15.0 fL    Platelets 87 (L) 264 - 450 E9/L    MPV 11.4 7.0 - 12.0 fL   Platelet Confirmation   Result Value Ref Range    Platelet Confirmation SEE BELOW Vitamin B12 & folate   Result Value Ref Range    Vitamin B-12 363 211 - 946 pg/mL    Folate 9.0 4.8 - 24.2 ng/mL   Basic Metabolic Panel w/ Reflex to MG   Result Value Ref Range    Sodium 137 132 - 146 mmol/L    Potassium reflex Magnesium 4.1 3.5 - 5.0 mmol/L    Chloride 106 98 - 107 mmol/L    CO2 21 (L) 22 - 29 mmol/L    Anion Gap 10 7 - 16 mmol/L    Glucose 107 (H) 74 - 99 mg/dL    BUN 24 (H) 6 - 23 mg/dL    CREATININE 1.3 (H) 0.7 - 1.2 mg/dL    GFR Non-African American 54 >=60 mL/min/1.73    GFR African American >60     Calcium 8.9 8.6 - 10.2 mg/dL   CBC   Result Value Ref Range    WBC 5.5 4.5 - 11.5 E9/L    RBC 4.77 3.80 - 5.80 E12/L    Hemoglobin 15.3 12.5 - 16.5 g/dL    Hematocrit 46.4 37.0 - 54.0 %    MCV 97.3 80.0 - 99.9 fL    MCH 32.1 26.0 - 35.0 pg    MCHC 33.0 32.0 - 34.5 %    RDW 13.7 11.5 - 15.0 fL    Platelets 95 (L) 723 - 450 E9/L    MPV 10.5 7.0 - 12.0 fL   Platelet Confirmation   Result Value Ref Range    Platelet Confirmation CONFIRMED         Imaging: All Radiology results interpreted by Radiologist unless otherwise noted. CT Head wo Contrast   Final Result   Interval placement of a right frontal ellie hole and subdural drain with   marginal improvement in the previously noted late acute and subacute subdural   hematoma with improving mass effect. CT HEAD WO CONTRAST   Final Result   Right frontal acute on chronic subdural hematoma, unchanged since the prior   examination.                ED Course / Medical Decision Making     Medications   sodium chloride flush 0.9 % injection 10 mL (10 mLs Intravenous Given 6/27/21 0748)   sodium chloride flush 0.9 % injection 10 mL (has no administration in time range)   0.9 % sodium chloride infusion (has no administration in time range)   ondansetron (ZOFRAN-ODT) disintegrating tablet 4 mg (has no administration in time range)     Or   ondansetron (ZOFRAN) injection 4 mg (has no administration in time range)   polyethylene glycol (GLYCOLAX) packet 17 g (17 g Oral Given 6/27/21 0747)   fleet rectal enema 1 enema (has no administration in time range)   atorvastatin (LIPITOR) tablet 20 mg (20 mg Oral Given 6/26/21 2140)   metoprolol succinate (TOPROL XL) extended release tablet 100 mg (100 mg Oral Given 6/27/21 0746)   allopurinol (ZYLOPRIM) tablet 300 mg (300 mg Oral Given 6/27/21 0747)   methIMAzole (TAPAZOLE) tablet 7.5 mg (7.5 mg Oral Given 6/27/21 0747)   probenecid (BENEMID) tablet 500 mg (500 mg Oral Given 6/27/21 0746)     And   colchicine (COLCRYS) tablet 0.6 mg (0.6 mg Oral Given 6/27/21 0746)   polyethylene glycol (GLYCOLAX) packet 17 g (has no administration in time range)   ondansetron (ZOFRAN-ODT) disintegrating tablet 4 mg (has no administration in time range)     Or   ondansetron (ZOFRAN) injection 4 mg (has no administration in time range)   ceFAZolin (ANCEF) 2000 mg in sterile water 20 mL IV syringe (2,000 mg Intravenous Given 6/27/21 0747)   acetaminophen (TYLENOL) tablet 650 mg (has no administration in time range)   butalbital-acetaminophen-caffeine (FIORICET, ESGIC) per tablet 2 tablet (2 tablets Oral Given 6/27/21 0600)   levETIRAcetam (KEPPRA) tablet 500 mg (500 mg Oral Given 6/27/21 0746)   melatonin disintegrating tablet 5 mg (5 mg Oral Given 6/27/21 0059)   levETIRAcetam (KEPPRA) 500 mg/100 mL IVPB (0 mg Intravenous Stopped 6/24/21 2057)         Consult(s):   IP CONSULT TO TRAUMA SURGERY to see in ER and admit to hospital  IP 2 [facility] will follow    Procedure(s):  There were no wounds requiring formal closure. Plan of Care/Counseling:  Terry Hilton reviewed today's visit with the patient in addition to providing specific details for the plan of care and counseling regarding the diagnosis and prognosis. Questions are answered at this time and are agreeable with the plan. Assessment      1.  SDH (subdural hematoma) ([address]      Plan   Admit  Patient condition is good    New Medications     Current Discharge Medication

## 2021-06-24 NOTE — ED PROVIDER NOTES
Department of Emergency Medicine   ED  Provider Note  Admit Date/RoomTime: 6/24/2021  1:04 PM  ED Room: 20/20          History of Present Illness:  6/24/21, Time: 1:28 PM EDT  Chief Complaint   Patient presents with    Fatigue     for the past month has been getting weaker, back pain, occasional extremities numbness (lip and arm).  Back Pain    Extremity Weakness        Daren Rodney is a 76 y.o. male presenting to the ED for fatigue, weakness, and back pain, beginning a month ago. The complaint has been persistent, moderate in severity, and worsened by nothing. The patient is a 44-year-old male with a history of CAD, hypertension, hyperlipidemia, hypothyroidism who presents to the emergency department complaining of fatigue, weakness, and back pain. Patient symptoms have been gradual in onset over the past month, persistent, moderate severity, nothing makes it better or worse. He states that he has chronic back pain and he feels like he is just getting weaker and weaker and has occasional numbness over the left side of his face and around his lips and in his left arm. Patient also was experiencing fever and chills last night. Wife states he has been more forgetful than usual as well, and leaving the car door open at times without realizing. Upon further questioning the patient and wife admits that the patient was at football practice where he has a  and was knocked over and fell and hit his head on the turf about two weeks ago. Apparently his headache started ever since that occurred. Denies any nausea, vomiting, diarrhea, chest pain, palpitations, lightheadedness, dizziness, syncope, abdominal pain, unilateral weakness, difficulty with speech, difficulty swallowing, recent hospitalization, recent illness, or other acute symptoms or concerns. He is on Plavix, but no anticoagulants otherwise.     Review of Systems:   Pertinent positives and negatives are stated within HPI, all other systems reviewed and are negative.        --------------------------------------------- PAST HISTORY ---------------------------------------------  Past Medical History:  has a past medical history of Bilateral renal cysts, CAD (coronary artery disease), Cardiomyopathy ([facility] Chest pain, History of gout, History of pulmonary embolism, Hyperlipidemia, Hypertension, Hyperthyroidism, NSTEMI (non-ST elevated myocardial infarction) ([facility] Obesity due to excess calories, Thyroid cyst, and Thyroid disease. Past Surgical History:  has a past surgical history that includes back surgery; Coronary angioplasty with stent (10-); and Diagnostic Cardiac Cath Lab Procedure (10/13/2014). Social History:  reports that he has never smoked. He has never used smokeless tobacco. He reports current alcohol use. He reports that he does not use drugs. Family History: family history includes Heart Disease in his father and mother. . Unless otherwise noted, family history is non contributory    The patients home medications have been reviewed. Allergies: Patient has no known allergies. I have reviewed the past medical history, past surgical history, social history, and family history    ---------------------------------------------------PHYSICAL EXAM--------------------------------------    Constitutional/General: Alert and oriented x3  Head: Normocephalic and atraumatic  Eyes:  EOMI, sclera non icteric  Mouth: Oropharynx clear, handling secretions, no trismus, no asymmetry of the posterior oropharynx or uvular edema  Neck: Supple, full ROM, no stridor, no meningeal signs  Respiratory: Lungs clear to auscultation bilaterally, no wheezes, rales, or rhonchi. Not in respiratory distress  Cardiovascular:  Regular rate. Regular rhythm. No murmurs, no aortic murmurs, no gallops, or rubs. 2+ distal pulses. Equal extremity pulses.    Chest: No chest wall tenderness  Gastrointestinal:  Abdomen Soft, Non tender, Non distended. No rebound, guarding, or rigidity. No pulsatile masses. Musculoskeletal: Moves all extremities x 4. Warm and well perfused, no clubbing, no cyanosis, no edema. Capillary refill <3 seconds  Skin: skin warm and dry. No rashes. Neurologic: GCS 15, no focal deficits, symmetric strength 5/5 in the upper and lower extremities bilaterally, no ataxia, cranial nerves are intact, sensation intact and equal in bilateral upper and lower extremities  Psychiatric: Normal Affect    -------------------------------------------------- RESULTS -------------------------------------------------  I have personally reviewed all laboratory and imaging results for this patient. Results are listed below.      LABS: (Lab results interpreted by me)  Results for orders placed or performed during the hospital encounter of 06/24/21   COVID-19, Rapid    Specimen: Nasopharyngeal Swab   Result Value Ref Range    SARS-CoV-2, NAAT Not Detected Not Detected   CBC auto differential   Result Value Ref Range    WBC 7.7 4.5 - 11.5 E9/L    RBC 5.14 3.80 - 5.80 E12/L    Hemoglobin 16.6 (H) 12.5 - 16.5 g/dL    Hematocrit 49.7 37.0 - 54.0 %    MCV 96.7 80.0 - 99.9 fL    MCH 32.3 26.0 - 35.0 pg    MCHC 33.4 32.0 - 34.5 %    RDW 13.6 11.5 - 15.0 fL    Platelets 714 581 - 446 E9/L    MPV 10.9 7.0 - 12.0 fL    Neutrophils % 77.9 43.0 - 80.0 %    Immature Granulocytes % 0.7 0.0 - 5.0 %    Lymphocytes % 8.6 (L) 20.0 - 42.0 %    Monocytes % 10.7 2.0 - 12.0 %    Eosinophils % 1.8 0.0 - 6.0 %    Basophils % 0.3 0.0 - 2.0 %    Neutrophils Absolute 5.99 1.80 - 7.30 E9/L    Immature Granulocytes # 0.05 E9/L    Lymphocytes Absolute 0.66 (L) 1.50 - 4.00 E9/L    Monocytes Absolute 0.82 0.10 - 0.95 E9/L    Eosinophils Absolute 0.14 0.05 - 0.50 E9/L    Basophils Absolute 0.02 0.00 - 0.20 E9/L   Comprehensive Metabolic Panel w/ Reflex to MG   Result Value Ref Range    Sodium 138 132 - 146 mmol/L    Potassium reflex Magnesium 4.0 3.5 - 5.0 mmol/L    Chloride 102 98 - 107 mmol/L    CO2 23 22 - 29 mmol/L    Anion Gap 13 7 - 16 mmol/L    Glucose 99 74 - 99 mg/dL    BUN 33 (H) 6 - 23 mg/dL    CREATININE 1.7 (H) 0.7 - 1.2 mg/dL    GFR Non-African American 39 >=60 mL/min/1.73    GFR African American 48     Calcium 9.6 8.6 - 10.2 mg/dL    Total Protein 6.8 6.4 - 8.3 g/dL    Albumin 3.4 (L) 3.5 - 5.2 g/dL    Total Bilirubin 1.2 0.0 - 1.2 mg/dL    Alkaline Phosphatase 85 40 - 129 U/L    ALT 20 0 - 40 U/L    AST 25 0 - 39 U/L   Troponin   Result Value Ref Range    Troponin, High Sensitivity 99 (H) 0 - 11 ng/L   Urinalysis with Microscopic   Result Value Ref Range    Color, UA DKYELLOW Straw/Yellow    Clarity, UA Clear Clear    Glucose, Ur Negative Negative mg/dL    Bilirubin Urine Negative Negative    Ketones, Urine Negative Negative mg/dL    Specific Gravity, UA 1.025 1.005 - 1.030    Blood, Urine SMALL (A) Negative    pH, UA 5.5 5.0 - 9.0    Protein,  (A) Negative mg/dL    Urobilinogen, Urine 1.0 <2.0 E.U./dL    Nitrite, Urine Negative Negative    Leukocyte Esterase, Urine Negative Negative    WBC, UA 0-1 0 - 5 /HPF    RBC, UA 0-1 0 - 2 /HPF    Bacteria, UA RARE (A) None Seen /HPF   Lactic Acid, Plasma   Result Value Ref Range    Lactic Acid 0.9 0.5 - 2.2 mmol/L   EKG 12 Lead   Result Value Ref Range    Ventricular Rate 69 BPM    Atrial Rate 69 BPM    P-R Interval 274 ms    QRS Duration 104 ms    Q-T Interval 414 ms    QTc Calculation (Bazett) 443 ms    P Axis 75 degrees    R Axis -10 degrees    T Axis 45 degrees   ,       RADIOLOGY:  Interpreted by Radiologist unless otherwise specified  CT HEAD WO CONTRAST   Final Result   1. 9 cm x 7 cm x 2.8 cm extra-axial mixed attenuated fluid collection seen   within the right frontal convexity consistent with a acute on chronic   subdural hematoma. 2. There is effacement of the right frontal lobe   3. There is no midline shift. 4. Left temporal fossa arachnoid cyst   5.  This report was called to the emergency room following dictation. XR CHEST (2 VW)   Final Result   No acute process. EKG Interpretation  Interpreted by Winnie Eli DO    EKG: This EKG is signed and interpreted by me. Rate: 69  Rhythm: Sinus  Interpretation: Normal sinus rhythm with first AV block, normal axis, no acute ST elevations or depressions, intervals are within normal limits, QTC is 443  Comparison: no previous EKG available       ------------------------- NURSING NOTES AND VITALS REVIEWED ---------------------------   The nursing notes within the ED encounter and vital signs as below have been reviewed by myself  /75   Pulse 77   Temp 98.4 °F (36.9 °C) (Oral)   Resp 16   Ht 6' (1.829 m)   Wt 248 lb (112.5 kg)   SpO2 95%   BMI 33.63 kg/m²     Oxygen Saturation Interpretation: 95 % on room air. Normal    The patients available past medical records and past encounters were reviewed. ------------------------------ ED COURSE/MEDICAL DECISION MAKING----------------------  Medications - No data to display        The cardiac monitor revealed NSR with a heart rate in the 70s as interpreted by me. The cardiac monitor was ordered secondary to the patient's fatigue and to monitor the patient for dysrhythmia. CPT [id number]           Medical Decision Making:     The patient was seen and evaluated by the Attending Emergency Medicine Physician Dr. Gregory Kaur. The patient is a 77-year-old male who presents the emergency department complaining of fatigue, weakness, back pain. The patient is hemodynamically stable, nontoxic in appearance, and in no acute distress. There are no focal neurological deficits on exam.    Patient was discussed with the with the  Resident. Please refer the resident's note for final disposition and medical decision making. Subjective:      Annabel Her is a 77-year-old male who is have increasing forgetfulness over the past few days.   For example he forgot to close his car door 2 separate times while trying to his wife up from the grocery store. He also reports he had shaking chills last night lasting for about an hour. He describes increasing low back pain. He denies any urinary frequency. He denies any loss of bowel or bladder control. Denies any weakness or numbness. He has had chronic back pain in the past.    Objective:         Vitals:    06/24/21 1230   BP: 112/75   Pulse: 77   Resp: 16   Temp: 98.4 °F (36.9 °C)   SpO2: 95%     Alert male in no acute distress at this time vital signs are stable  Head atraumatic normocephalic  Neck supple normal range of motion without adenopathy  Pharynx benign  Heart tones normal regular rate and rhythm  Lung sounds clear and equal without rales rhonchi or wheezes  Abdomen soft obese positive bowel sounds nontender  Mild central lower lumbar tenderness to palpation  Patellar reflexes 2+ and equal lower extremity strength 4+/5 bilaterally lower extremity sensation is intact to light touch    Assessment:   Headache  Confusion  Rigors   Back pain      Plan:                 Transfer to [facility] for further ongoing neurosurgical care    TONY Arguello MD      Re-Evaluations:  ED Course as of Jun 24 1644   Thu Jun 24, 2021   1602 Consulted with Dr. Billy Carr, radiology, who states acute on chronic subdural. No midline shift. [KG]   65 Consulted with Dr. Bhavani Freeman, neurosurgery, who states he wants the patient transferred to [facility]. E's to be evaluated by trauma. [KG]   [id number] Consulted with Dr. Nadir Madrid, ED physician, who accepted for transfer to [address] [place] [address]      ED Course User Index  [KG] Virgil Braun DO       Patient is resting comfortably and in no acute distress on reassessment.       This patient's ED course included: a personal history and physicial examination, re-evaluation prior to disposition, multiple bedside re-evaluations, IV medications, cardiac monitoring, continuous pulse oximetry and complex medical decision making and emergency management    This patient has remained hemodynamically stable during their ED course. Consultations:    Dr. Alejandro Hill accepted in transfer. Counseling: The emergency provider has spoken with the patient and discussed todays results, in addition to providing specific details for the plan of care and counseling regarding the diagnosis and prognosis. Questions are answered at this time and they are agreeable with the plan.       --------------------------------- IMPRESSION AND DISPOSITION ---------------------------------    IMPRESSION  1. Acute on chronic intracranial subdural hematoma ([facility]        DISPOSITION  Disposition: Transfer to [facility]  Patient condition is fair        NOTE: This report was transcribed using voice recognition software.  Every effort was made to ensure accuracy; however, inadvertent computerized transcription errors may be present       Henry Begum MD  06/24/21 1431

## 2021-06-25 ENCOUNTER — ANESTHESIA (OUTPATIENT)
Dept: OPERATING ROOM | Age: 76
DRG: 026 | End: 2021-06-25
Payer: MEDICARE

## 2021-06-25 VITALS
RESPIRATION RATE: 22 BRPM | OXYGEN SATURATION: 99 % | DIASTOLIC BLOOD PRESSURE: 56 MMHG | SYSTOLIC BLOOD PRESSURE: 98 MMHG

## 2021-06-25 LAB
ANION GAP SERPL CALCULATED.3IONS-SCNC: 11 MMOL/L (ref 7–16)
BUN BLDV-MCNC: 29 MG/DL (ref 6–23)
CALCIUM SERPL-MCNC: 9.4 MG/DL (ref 8.6–10.2)
CHLORIDE BLD-SCNC: 103 MMOL/L (ref 98–107)
CO2: 21 MMOL/L (ref 22–29)
CREAT SERPL-MCNC: 1.3 MG/DL (ref 0.7–1.2)
EKG ATRIAL RATE: 69 BPM
EKG P AXIS: 75 DEGREES
EKG P-R INTERVAL: 274 MS
EKG Q-T INTERVAL: 414 MS
EKG QRS DURATION: 104 MS
EKG QTC CALCULATION (BAZETT): 443 MS
EKG R AXIS: -10 DEGREES
EKG T AXIS: 45 DEGREES
EKG VENTRICULAR RATE: 69 BPM
GFR AFRICAN AMERICAN: >60
GFR NON-AFRICAN AMERICAN: 54 ML/MIN/1.73
GLUCOSE BLD-MCNC: 87 MG/DL (ref 74–99)
HCT VFR BLD CALC: 48.5 % (ref 37–54)
HEMOGLOBIN: 15.8 G/DL (ref 12.5–16.5)
MCH RBC QN AUTO: 32.1 PG (ref 26–35)
MCHC RBC AUTO-ENTMCNC: 32.6 % (ref 32–34.5)
MCV RBC AUTO: 98.6 FL (ref 80–99.9)
PDW BLD-RTO: 13.8 FL (ref 11.5–15)
PLATELET # BLD: 101 E9/L (ref 130–450)
PMV BLD AUTO: 10.8 FL (ref 7–12)
POTASSIUM REFLEX MAGNESIUM: 4 MMOL/L (ref 3.5–5)
RBC # BLD: 4.92 E12/L (ref 3.8–5.8)
SODIUM BLD-SCNC: 135 MMOL/L (ref 132–146)
WBC # BLD: 6 E9/L (ref 4.5–11.5)

## 2021-06-25 PROCEDURE — 6360000002 HC RX W HCPCS: Performed by: NEUROLOGICAL SURGERY

## 2021-06-25 PROCEDURE — 2580000003 HC RX 258: Performed by: STUDENT IN AN ORGANIZED HEALTH CARE EDUCATION/TRAINING PROGRAM

## 2021-06-25 PROCEDURE — 3700000000 HC ANESTHESIA ATTENDED CARE: Performed by: NEUROLOGICAL SURGERY

## 2021-06-25 PROCEDURE — 93010 ELECTROCARDIOGRAM REPORT: CPT | Performed by: INTERNAL MEDICINE

## 2021-06-25 PROCEDURE — 6360000002 HC RX W HCPCS: Performed by: STUDENT IN AN ORGANIZED HEALTH CARE EDUCATION/TRAINING PROGRAM

## 2021-06-25 PROCEDURE — 3600000005 HC SURGERY LEVEL 5 BASE: Performed by: NEUROLOGICAL SURGERY

## 2021-06-25 PROCEDURE — C1729 CATH, DRAINAGE: HCPCS | Performed by: NEUROLOGICAL SURGERY

## 2021-06-25 PROCEDURE — 36415 COLL VENOUS BLD VENIPUNCTURE: CPT

## 2021-06-25 PROCEDURE — 6370000000 HC RX 637 (ALT 250 FOR IP): Performed by: NEUROLOGICAL SURGERY

## 2021-06-25 PROCEDURE — 2720000010 HC SURG SUPPLY STERILE: Performed by: NEUROLOGICAL SURGERY

## 2021-06-25 PROCEDURE — 85027 COMPLETE CBC AUTOMATED: CPT

## 2021-06-25 PROCEDURE — 6370000000 HC RX 637 (ALT 250 FOR IP): Performed by: SURGERY

## 2021-06-25 PROCEDURE — 2580000003 HC RX 258: Performed by: NEUROLOGICAL SURGERY

## 2021-06-25 PROCEDURE — 6360000002 HC RX W HCPCS

## 2021-06-25 PROCEDURE — 3700000001 HC ADD 15 MINUTES (ANESTHESIA): Performed by: NEUROLOGICAL SURGERY

## 2021-06-25 PROCEDURE — 2500000003 HC RX 250 WO HCPCS

## 2021-06-25 PROCEDURE — 2709999900 HC NON-CHARGEABLE SUPPLY: Performed by: NEUROLOGICAL SURGERY

## 2021-06-25 PROCEDURE — 6370000000 HC RX 637 (ALT 250 FOR IP): Performed by: STUDENT IN AN ORGANIZED HEALTH CARE EDUCATION/TRAINING PROGRAM

## 2021-06-25 PROCEDURE — 3600000015 HC SURGERY LEVEL 5 ADDTL 15MIN: Performed by: NEUROLOGICAL SURGERY

## 2021-06-25 PROCEDURE — 2000000000 HC ICU R&B

## 2021-06-25 PROCEDURE — C1713 ANCHOR/SCREW BN/BN,TIS/BN: HCPCS | Performed by: NEUROLOGICAL SURGERY

## 2021-06-25 PROCEDURE — 00C43ZZ EXTIRPATION OF MATTER FROM INTRACRANIAL SUBDURAL SPACE, PERCUTANEOUS APPROACH: ICD-10-PCS | Performed by: NEUROLOGICAL SURGERY

## 2021-06-25 PROCEDURE — 009430Z DRAINAGE OF INTRACRANIAL SUBDURAL SPACE WITH DRAINAGE DEVICE, PERCUTANEOUS APPROACH: ICD-10-PCS | Performed by: NEUROLOGICAL SURGERY

## 2021-06-25 PROCEDURE — 2500000003 HC RX 250 WO HCPCS: Performed by: NEUROLOGICAL SURGERY

## 2021-06-25 PROCEDURE — 80048 BASIC METABOLIC PNL TOTAL CA: CPT

## 2021-06-25 DEVICE — LOW PROFILE BURR HOLE COVER, W/TAB, 10MM
Type: IMPLANTABLE DEVICE | Site: CRANIAL | Status: FUNCTIONAL
Brand: UNIVERSAL NEURO 2

## 2021-06-25 DEVICE — SCREW, AXS, SELF-TAPPING
Type: IMPLANTABLE DEVICE | Site: CRANIAL | Status: FUNCTIONAL
Brand: UNIVERSAL NEURO 3

## 2021-06-25 RX ORDER — DIAPER,BRIEF,INFANT-TODD,DISP
EACH MISCELLANEOUS PRN
Status: DISCONTINUED | OUTPATIENT
Start: 2021-06-25 | End: 2021-06-25 | Stop reason: ALTCHOICE

## 2021-06-25 RX ORDER — ONDANSETRON 2 MG/ML
4 INJECTION INTRAMUSCULAR; INTRAVENOUS EVERY 6 HOURS PRN
Status: DISCONTINUED | OUTPATIENT
Start: 2021-06-25 | End: 2021-06-30 | Stop reason: HOSPADM

## 2021-06-25 RX ORDER — GLYCOPYRROLATE 1 MG/5 ML
SYRINGE (ML) INTRAVENOUS PRN
Status: DISCONTINUED | OUTPATIENT
Start: 2021-06-25 | End: 2021-06-26 | Stop reason: SDUPTHER

## 2021-06-25 RX ORDER — SODIUM CHLORIDE 0.9 % (FLUSH) 0.9 %
10 SYRINGE (ML) INJECTION PRN
Status: DISCONTINUED | OUTPATIENT
Start: 2021-06-25 | End: 2021-06-30 | Stop reason: HOSPADM

## 2021-06-25 RX ORDER — CEFAZOLIN SODIUM 1 G/3ML
INJECTION, POWDER, FOR SOLUTION INTRAMUSCULAR; INTRAVENOUS PRN
Status: DISCONTINUED | OUTPATIENT
Start: 2021-06-25 | End: 2021-06-26 | Stop reason: SDUPTHER

## 2021-06-25 RX ORDER — SODIUM CHLORIDE 9 MG/ML
INJECTION, SOLUTION INTRAVENOUS CONTINUOUS
Status: DISCONTINUED | OUTPATIENT
Start: 2021-06-25 | End: 2021-06-25

## 2021-06-25 RX ORDER — LIDOCAINE HYDROCHLORIDE 20 MG/ML
INJECTION, SOLUTION INTRAVENOUS PRN
Status: DISCONTINUED | OUTPATIENT
Start: 2021-06-25 | End: 2021-06-26 | Stop reason: SDUPTHER

## 2021-06-25 RX ORDER — KETAMINE HCL IN NACL, ISO-OSM 100MG/10ML
SYRINGE (ML) INJECTION PRN
Status: DISCONTINUED | OUTPATIENT
Start: 2021-06-25 | End: 2021-06-26 | Stop reason: SDUPTHER

## 2021-06-25 RX ORDER — FENTANYL CITRATE 50 UG/ML
INJECTION, SOLUTION INTRAMUSCULAR; INTRAVENOUS PRN
Status: DISCONTINUED | OUTPATIENT
Start: 2021-06-25 | End: 2021-06-26 | Stop reason: SDUPTHER

## 2021-06-25 RX ORDER — ONDANSETRON 4 MG/1
4 TABLET, ORALLY DISINTEGRATING ORAL EVERY 8 HOURS PRN
Status: DISCONTINUED | OUTPATIENT
Start: 2021-06-25 | End: 2021-06-30 | Stop reason: HOSPADM

## 2021-06-25 RX ORDER — ACETAMINOPHEN 325 MG/1
650 TABLET ORAL EVERY 6 HOURS
Status: DISCONTINUED | OUTPATIENT
Start: 2021-06-25 | End: 2021-06-26

## 2021-06-25 RX ORDER — SODIUM PHOSPHATE, DIBASIC AND SODIUM PHOSPHATE, MONOBASIC 7; 19 G/133ML; G/133ML
1 ENEMA RECTAL DAILY PRN
Status: DISCONTINUED | OUTPATIENT
Start: 2021-06-25 | End: 2021-06-30 | Stop reason: HOSPADM

## 2021-06-25 RX ORDER — SODIUM CHLORIDE 0.9 % (FLUSH) 0.9 %
10 SYRINGE (ML) INJECTION EVERY 12 HOURS SCHEDULED
Status: DISCONTINUED | OUTPATIENT
Start: 2021-06-25 | End: 2021-06-30 | Stop reason: HOSPADM

## 2021-06-25 RX ORDER — POLYETHYLENE GLYCOL 3350 17 G/17G
17 POWDER, FOR SOLUTION ORAL DAILY
Status: DISCONTINUED | OUTPATIENT
Start: 2021-06-25 | End: 2021-06-30 | Stop reason: HOSPADM

## 2021-06-25 RX ORDER — METOPROLOL SUCCINATE 50 MG/1
100 TABLET, EXTENDED RELEASE ORAL DAILY
Status: DISCONTINUED | OUTPATIENT
Start: 2021-06-25 | End: 2021-06-28

## 2021-06-25 RX ORDER — POLYETHYLENE GLYCOL 3350 17 G/17G
17 POWDER, FOR SOLUTION ORAL DAILY PRN
Status: DISCONTINUED | OUTPATIENT
Start: 2021-06-25 | End: 2021-06-30 | Stop reason: HOSPADM

## 2021-06-25 RX ORDER — BISACODYL 10 MG
10 SUPPOSITORY, RECTAL RECTAL DAILY
Status: DISCONTINUED | OUTPATIENT
Start: 2021-06-25 | End: 2021-06-25

## 2021-06-25 RX ORDER — COLCHICINE 0.6 MG/1
0.6 TABLET ORAL DAILY
Status: DISCONTINUED | OUTPATIENT
Start: 2021-06-25 | End: 2021-06-30 | Stop reason: HOSPADM

## 2021-06-25 RX ORDER — PROBENECID 500 MG/1
500 TABLET, FILM COATED ORAL DAILY
Status: DISCONTINUED | OUTPATIENT
Start: 2021-06-25 | End: 2021-06-30 | Stop reason: HOSPADM

## 2021-06-25 RX ORDER — LEVETIRACETAM 5 MG/ML
500 INJECTION INTRAVASCULAR EVERY 12 HOURS
Status: DISCONTINUED | OUTPATIENT
Start: 2021-06-25 | End: 2021-06-26

## 2021-06-25 RX ORDER — ATORVASTATIN CALCIUM 20 MG/1
20 TABLET, FILM COATED ORAL NIGHTLY
Status: DISCONTINUED | OUTPATIENT
Start: 2021-06-25 | End: 2021-06-30 | Stop reason: HOSPADM

## 2021-06-25 RX ORDER — GLYCOPYRROLATE 1 MG/5 ML
SYRINGE (ML) INTRAVENOUS PRN
Status: DISCONTINUED | OUTPATIENT
Start: 2021-06-25 | End: 2021-06-25

## 2021-06-25 RX ORDER — BUTALBITAL, ACETAMINOPHEN AND CAFFEINE 50; 325; 40 MG/1; MG/1; MG/1
1 TABLET ORAL EVERY 4 HOURS PRN
Status: DISCONTINUED | OUTPATIENT
Start: 2021-06-25 | End: 2021-06-26

## 2021-06-25 RX ORDER — SODIUM CHLORIDE 9 MG/ML
25 INJECTION, SOLUTION INTRAVENOUS PRN
Status: DISCONTINUED | OUTPATIENT
Start: 2021-06-25 | End: 2021-06-30 | Stop reason: HOSPADM

## 2021-06-25 RX ORDER — PROPOFOL 10 MG/ML
INJECTION, EMULSION INTRAVENOUS CONTINUOUS PRN
Status: DISCONTINUED | OUTPATIENT
Start: 2021-06-25 | End: 2021-06-26 | Stop reason: SDUPTHER

## 2021-06-25 RX ORDER — DIPHENHYDRAMINE HCL 25 MG
50 TABLET ORAL NIGHTLY PRN
Status: DISCONTINUED | OUTPATIENT
Start: 2021-06-25 | End: 2021-06-26

## 2021-06-25 RX ORDER — LIDOCAINE HYDROCHLORIDE AND EPINEPHRINE 10; 10 MG/ML; UG/ML
INJECTION, SOLUTION INFILTRATION; PERINEURAL PRN
Status: DISCONTINUED | OUTPATIENT
Start: 2021-06-25 | End: 2021-06-25 | Stop reason: ALTCHOICE

## 2021-06-25 RX ADMIN — LIDOCAINE HYDROCHLORIDE 50 MG: 20 INJECTION, SOLUTION INTRAVENOUS at 10:05

## 2021-06-25 RX ADMIN — ACETAMINOPHEN 650 MG: 325 TABLET ORAL at 19:59

## 2021-06-25 RX ADMIN — ALLOPURINOL 300 MG: 300 TABLET ORAL at 08:57

## 2021-06-25 RX ADMIN — SODIUM CHLORIDE: 9 INJECTION, SOLUTION INTRAVENOUS at 01:30

## 2021-06-25 RX ADMIN — LEVETIRACETAM 500 MG: 5 INJECTION, SOLUTION INTRAVENOUS at 02:07

## 2021-06-25 RX ADMIN — Medication 0.2 MG: at 09:32

## 2021-06-25 RX ADMIN — PHENYLEPHRINE HYDROCHLORIDE 200 MCG: 10 INJECTION INTRAVENOUS at 10:15

## 2021-06-25 RX ADMIN — ACETAMINOPHEN 650 MG: 325 TABLET ORAL at 13:57

## 2021-06-25 RX ADMIN — Medication 10 ML: at 08:57

## 2021-06-25 RX ADMIN — SODIUM CHLORIDE: 9 INJECTION, SOLUTION INTRAVENOUS at 16:21

## 2021-06-25 RX ADMIN — COLCHICINE 0.6 MG: 0.6 TABLET ORAL at 08:57

## 2021-06-25 RX ADMIN — ATORVASTATIN CALCIUM 20 MG: 20 TABLET, FILM COATED ORAL at 19:59

## 2021-06-25 RX ADMIN — PHENYLEPHRINE HYDROCHLORIDE 100 MCG: 10 INJECTION INTRAVENOUS at 10:23

## 2021-06-25 RX ADMIN — PHENYLEPHRINE HYDROCHLORIDE 100 MCG: 10 INJECTION INTRAVENOUS at 10:10

## 2021-06-25 RX ADMIN — PROPOFOL 100 MCG/KG/MIN: 10 INJECTION, EMULSION INTRAVENOUS at 09:45

## 2021-06-25 RX ADMIN — FENTANYL CITRATE 50 MCG: 50 INJECTION, SOLUTION INTRAMUSCULAR; INTRAVENOUS at 09:29

## 2021-06-25 RX ADMIN — Medication 30 MG: at 09:36

## 2021-06-25 RX ADMIN — PHENYLEPHRINE HYDROCHLORIDE 100 MCG: 10 INJECTION INTRAVENOUS at 10:20

## 2021-06-25 RX ADMIN — PROBENECID 500 MG: 500 TABLET, FILM COATED ORAL at 08:56

## 2021-06-25 RX ADMIN — METHIMAZOLE 7.5 MG: 5 TABLET ORAL at 08:57

## 2021-06-25 RX ADMIN — BUTALBITAL, ACETAMINOPHEN, AND CAFFEINE 1 TABLET: 50; 325; 40 TABLET ORAL at 21:50

## 2021-06-25 RX ADMIN — Medication 10 ML: at 20:00

## 2021-06-25 RX ADMIN — LEVETIRACETAM 500 MG: 5 INJECTION, SOLUTION INTRAVENOUS at 13:57

## 2021-06-25 RX ADMIN — BUTALBITAL, ACETAMINOPHEN, AND CAFFEINE 1 TABLET: 50; 325; 40 TABLET ORAL at 18:06

## 2021-06-25 RX ADMIN — CEFAZOLIN 2000 MG: 1 INJECTION, POWDER, FOR SOLUTION INTRAMUSCULAR; INTRAVENOUS at 09:37

## 2021-06-25 RX ADMIN — Medication 2000 MG: at 16:59

## 2021-06-25 RX ADMIN — Medication 20 MG: at 10:13

## 2021-06-25 RX ADMIN — METOPROLOL SUCCINATE 100 MG: 50 TABLET, EXTENDED RELEASE ORAL at 08:57

## 2021-06-25 ASSESSMENT — PULMONARY FUNCTION TESTS
PIF_VALUE: 1
PIF_VALUE: 0
PIF_VALUE: 1
PIF_VALUE: 0
PIF_VALUE: 1

## 2021-06-25 ASSESSMENT — PAIN SCALES - GENERAL
PAINLEVEL_OUTOF10: 0
PAINLEVEL_OUTOF10: 6
PAINLEVEL_OUTOF10: 2
PAINLEVEL_OUTOF10: 0
PAINLEVEL_OUTOF10: 8
PAINLEVEL_OUTOF10: 2
PAINLEVEL_OUTOF10: 0
PAINLEVEL_OUTOF10: 2
PAINLEVEL_OUTOF10: 0
PAINLEVEL_OUTOF10: 2
PAINLEVEL_OUTOF10: 6
PAINLEVEL_OUTOF10: 0

## 2021-06-25 ASSESSMENT — PAIN DESCRIPTION - ONSET
ONSET: ON-GOING
ONSET: ON-GOING

## 2021-06-25 ASSESSMENT — PAIN DESCRIPTION - LOCATION
LOCATION: HEAD

## 2021-06-25 ASSESSMENT — PAIN DESCRIPTION - PROGRESSION
CLINICAL_PROGRESSION: GRADUALLY WORSENING
CLINICAL_PROGRESSION: GRADUALLY IMPROVING

## 2021-06-25 ASSESSMENT — PAIN DESCRIPTION - FREQUENCY
FREQUENCY: CONTINUOUS
FREQUENCY: CONTINUOUS

## 2021-06-25 ASSESSMENT — PAIN DESCRIPTION - PAIN TYPE
TYPE: ACUTE PAIN
TYPE: ACUTE PAIN

## 2021-06-25 ASSESSMENT — PAIN DESCRIPTION - DESCRIPTORS
DESCRIPTORS: SORE
DESCRIPTORS: SORE

## 2021-06-25 ASSESSMENT — ENCOUNTER SYMPTOMS: SHORTNESS OF BREATH: 0

## 2021-06-25 ASSESSMENT — PAIN DESCRIPTION - ORIENTATION
ORIENTATION: RIGHT
ORIENTATION: RIGHT

## 2021-06-25 ASSESSMENT — PAIN - FUNCTIONAL ASSESSMENT
PAIN_FUNCTIONAL_ASSESSMENT: ACTIVITIES ARE NOT PREVENTED
PAIN_FUNCTIONAL_ASSESSMENT: ACTIVITIES ARE NOT PREVENTED

## 2021-06-25 ASSESSMENT — LIFESTYLE VARIABLES: SMOKING_STATUS: 0

## 2021-06-25 NOTE — BRIEF OP NOTE
Brief Postoperative Note      Patient: Yi Darling  YOB: 1945  MRN: [id number]    Date of Procedure: 6/25/2021    Pre-Op Diagnosis: SUBDURAL HEMATOMA    Post-Op Diagnosis: Same       Procedure(s):  RIGHT FRONTAL REMINGTON HOLE TO DRAIN HEMATOMA AND PLACEMENT OF SUBDURAL DRAIN    Surgeon(s):  Wily Carroll MD    Assistant:  * No surgical staff found *    Anesthesia: General    Estimated Blood Loss (mL): 015     Complications: None    Specimens:   * No specimens in log *    Implants:  Implant Name Type Inv. Item Serial No.  Lot No. LRB No. Used Action   SCREW BONE L4MM DIA1. 5MM ST SELF CNTR AX STBL UNIII  SCREW BONE L4MM DIA1. 5MM ST SELF CNTR AX STBL UNIII  [employer]WD  Right 4 Implanted   COVER BUR H L10MM W/ SHUNT PASS  COVER BUR H L10MM W/ SHUNT PASS  [employer]WD  Right 1 Implanted         Drains:   Closed/Suction Drain Right Scalp (Active)       Findings:     Electronically signed by Wily Carroll MD on 6/25/2021 at 11:29 AM

## 2021-06-25 NOTE — PROGRESS NOTES
Physical Therapy  Name: Paris Vieira  Room: 8148/4673-J  Date: 06/25/21    PT consult received and appreciated. Per chart review, pt for ellie hole crani this date. Will follow and complete as appropriate post OR.      Antonieta Browne, PT, DPT  [id number]

## 2021-06-25 NOTE — OP NOTE
[address]                  [address] [facility], [address] [place] [address]                                OPERATIVE REPORT    PATIENT NAME: Rock Bucio                          :        1945  MED REC NO:   [id number]                            ROOM:       3027  ACCOUNT NO:   [de-identified]                           ADMIT DATE: 2021  PROVIDER:     Linda Espinoza MD    DATE OF PROCEDURE:  2021    PREOPERATIVE DIAGNOSIS:  Large right frontal subacute subdural hematoma. POSTOPERATIVE DIAGNOSIS:  Large right frontal subacute subdural  hematoma. PROCEDURES PERFORMED:  Right frontal bur hole for evacuation of subdural  hematoma, placement of subdural drain. ATTENDING SURGEON:  Linda Espinoza MD    ANESTHESIA:  Local monitored. INDICATIONS:  This gentleman presented with some confusion and numbness  and a CT scan of the head showed a large acute subdural hematoma. He  was taken to the OR electively to drain it. DESCRIPTION OF PROCEDURE:  Following intravenous sedation, he was left  supine on the OR table. Head was turned to the left towards Anesthesia. The right frontal region was prepped and draped per routine fashion. Linear incision was made over the right frontal boss. Dissection  carried down to midline. A self-retaining retractor was placed in the  wound. Bur hole was placed in a standard fashion. Dura was cauterized. Subacute subdural hematoma was encountered and was irrigated out until  clear. Drain was placed in the subdural space, tunneled posteriorly,  connected to a sterile closed drainage system. Bur hole cover placed  over the bur hole. Wound closed in layers. Estimated blood loss 200  mL. No apparent complications. Taken back to ICU in stable condition.         Giorgio Gallo MD    D: 2021 11:50:34       T: 2021 12:00:59     [email or web address]  Job#: [id number]     Doc#: [id number]    CC:

## 2021-06-25 NOTE — PLAN OF CARE
Problem: Falls - Risk of:  Goal: Will remain free from falls  Description: Will remain free from falls  Outcome: Met This Shift     Problem: HEMODYNAMIC STATUS  Goal: Patient has stable vital signs and fluid balance  Outcome: Met This Shift     Problem: COMMUNICATION IMPAIRMENT  Goal: Ability to express needs and understand communication  Outcome: Met This Shift

## 2021-06-25 NOTE — H&P
TRAUMA HISTORY & PHYSICAL  Surgical Resident/Advance Practice Nurse  6/24/2021  8:18 PM    PRIMARY SURVEY    CHIEF COMPLAINT:  Trauma consult. patient fell two weeks ago and then again on Tuesday. No LOC, headaches and \"flogging\". Found to have acute on chronic SDH. GCS 15    AIRWAY:   Airway Normal  EMS ETT Absent  Noisy respirations Absent  Retractions: Absent  Vomiting/bleeding: Absent      BREATHING:    Midaxillary breath sound left:  Normal  Midaxillary breath sound right:  Normal    Cough sound intensity:  good   FiO2: room air  [address] mL.       CIRCULATION:   Femerol pulse intensity: Strong  Palpebral conjunctiva: white      Vitals:    06/24/21 1800   BP: 133/86   Pulse: 72   Resp: 16   Temp: 97.5 °F (36.4 °C)   SpO2: 98%       Vitals:    06/24/21 1800   BP: 133/86   Pulse: 72   Resp: 16   Temp: 97.5 °F (36.4 °C)   TempSrc: Temporal   SpO2: 98%        FAST EXAM: none    Central Nervous System    GCS Initial 15 minutes   Eye  Motor  Verbal 4 - Opens eyes on own  6 - Follows simple motor commands  5 - Alert and oriented 4 - Opens eyes on own  6 - Follows simple motor commands  5 - Alert and oriented     Neuromuscular blockade: No  Pupil size:  Left 4 mm    Right 4 mm  Pupil reaction: Yes    Wiggles fingers: Left Yes Right Yes  Wiggles toes: Left Yes   Right Yes    Hand grasp:   Left  Present      Right  Present  Plantar flexion: Left  Present      Right   Present    Loss of consciousness:  No  History Obtained From:  Patient & EMS  Private Medical Doctor: Brain Certain, DO    Pre-exisiting Medical History:  yes    Conditions: HTN, HLD, stent 5+ year ago    Medications: statin, metoprolol, plavix     Allergies: none    Social History:   Tobacco use:  none  Alcohol use:  socially  Illicit drug use:  no history of illicit drug use    Past Surgical History:  No abdominal surgeries     Anticoagulant use:  Yes plavix  Antiplatelet use:    No     NSAID use in last 72 hours: no  Taken PCN in past:  no  Last food/drink: this morning  Last tetanus: unknown    Family History:   Not pertinent to presenting problem. Complaints:   Head:  Mild  Neck:   Mild  Chest:   None  Back:   None  Abdomen:   None  Extremities:   None  Comments:     Review of systems:  All negative unless otherwise noted. SECONDARY SURVEY  Head/scalp: Atraumatic    Face: Atraumatic    Eyes/ears/nose: Atraumatic    Pharynx/mouth: Atraumatic    Neck: Atraumatic     Cervical spine tenderness:   Cervical collar not indicated  Pain:  Mild, laterally  ROM:  Normal    Chest wall:  Atraumatic    Heart:  Regular rate & rhythm    Abdomen: Atraumatic. Soft ND  Tenderness:  none    Pelvis: Atraumatic  Tenderness: none    Thoracolumbar spine: Atraumatic  Tenderness:  none    Genitourinary:  Atraumatic. No blood or urine noted    Rectum: Atraumatic. No blood noted. Perineum: Atraumatic. No blood or urine noted. Extremities:   Sensory normal  Motor normal    Distal Pulses  Left arm normal  Right arm normal  Left leg normal  Right leg normal    Capillary refill  Left arm normal  Right arm normal  Left leg normal  Right leg normal    Procedures in ED:  none    In the event of Emergency Blood Transfusion:  Due to the critical condition of this patient, I request the immediate release of blood products for emergency transfusion secondary to shock. I understand the increased risks incurred by the lack of complete transfusion testing.       Radiology: CT Head  and CT Cervical spine     Consultations:  Neurosurgery    Admission/Diagnosis: trauma, fall     Plan of Treatment:  Admit to SICU  OR tomorrow with Neurosurgery   Tert  dispo planning    hold anticoagulation  NPO, IVF     Plan discussed with Dr. Ariella Srivastava at 6/24/2021 on 8:18 PM    Electronically signed by hCerelle Talley DO on 6/24/2021 at 8:18 PM

## 2021-06-25 NOTE — ANESTHESIA POSTPROCEDURE EVALUATION
Department of Anesthesiology  Postprocedure Note    Patient: Kanarraville Score  MRN: [id number]  [place]: 1945  Date of evaluation: 6/27/2021  Time:  6:51 AM     Procedure Summary     Date: 06/25/21 Room / Location: [facility] OR 07 / [facility]    Anesthesia Start: 0786 Anesthesia Stop:     Procedure: Arvis Push (Right ) Diagnosis: (SUBDURAL HEMATOMA)    Surgeons: Aimee Zhao MD Responsible Provider:     Anesthesia Type: MAC ASA Status: 4          Anesthesia Type: MAC    Bethel Phase I:      Bethel Phase II:      Last vitals: Reviewed and per EMR flowsheets.        Anesthesia Post Evaluation    Patient location during evaluation: ICU  Patient participation: complete - patient participated  Level of consciousness: awake  Pain score: 3  Airway patency: patent  Nausea & Vomiting: no nausea and no vomiting  Complications: no  Cardiovascular status: blood pressure returned to baseline  Respiratory status: acceptable  Hydration status: euvolemic

## 2021-06-25 NOTE — PROGRESS NOTES
OCCUPATIONAL THERAPY    Date:2021  Patient Name: Rosa M Munguia  MRN: [id number]  : 1945  Room: [facility]/7111-              Chart reviewed. Pt on hold-ellie hole crani this am.  Will re-attempt at later time. Thank you for consult.     Lin Carrizales, OTR/L [id number]

## 2021-06-25 NOTE — CARE COORDINATION
SOCIAL WORK/CASEMANAGEMENT TRANSITION OF CARE [id number] [facility] [address], Shannan David, [phone]): met with pt and wife in the unit. Pt had a RIGHT FRONTAL REMINGTON HOLE TO DRAIN HEMATOMA AND PLACEMENT OF SUBDURAL DRAIN this a.m. he was alert and oriented when I met with him. The couple lives in a ranch home with 3 front steps to enter. Their grown daughter lives with them but doesn't work, she helps couple out if needed. Pt is not a . His pcp is dr. Noris Paulson. Pt pta has been very weak and only able to ambulate at the most 10' . He was independent with bathing and dressing taking twice as long to do it. Wife and daughter prepare meals and pt was independent with medications. No hhc or dme pta. Waiting for slp , PT and OT to eval. The couple hopes for pt to return home. I provided a hhc , deborah and aru list. Pt has medicare so a precert is not needed for rehab inpatient if needed. The couple said pt will be here until Monday. Riddhi/era to follow.  ANNELIESE Lynn  6/25/2021

## 2021-06-25 NOTE — ANESTHESIA PRE PROCEDURE
Department of Anesthesiology  Preprocedure Note       Name:  Nahed Thomas   Age:  76 y.o.  :  1945                                          MRN:  [id number]         Date:  2021      Surgeon: Al Orozco):  Jaqueline Whaley MD    Procedure: Procedure(s):  CRANIOTOMY REMINGTON HOLES    Medications prior to admission:   Prior to Admission medications    Medication Sig Start Date End Date Taking? Authorizing Provider   atorvastatin (LIPITOR) 20 MG tablet Take 20 mg by mouth nightly   Yes Historical Provider, MD   lisinopril (PRINIVIL;ZESTRIL) 10 MG tablet Take 10 mg by mouth daily   Yes Historical Provider, MD   diphenhydrAMINE (BENADRYL) 50 MG capsule Take 100 mg by mouth nightly as needed for Sleep   Yes Historical Provider, MD   colchicine-probenecid 0.5-500 MG per tablet TAKE ONE TABLET BY MOUTH EVERY DAY 10/7/20  Yes Historical Provider, MD   clopidogrel (PLAVIX) 75 MG tablet Take 1 tablet by mouth daily 9/15/20  Yes Maribeth Hayden MD   hydrochlorothiazide (MICROZIDE) 12.5 MG capsule Take 12.5 mg by mouth daily   Yes Historical Provider, MD   metoprolol succinate ER (TOPROL-XL) 100 MG XL tablet Take 100 mg by mouth daily   Yes Historical Provider, MD   methimazole (TAPAZOLE) 5 MG tablet Take 7.5 mg by mouth daily. 1.5 tabs   Yes Historical Provider, MD   allopurinol (ZYLOPRIM) 300 MG tablet Take 300 mg by mouth daily. Yes Historical Provider, MD   nitroGLYCERIN (NITROSTAT) 0.4 MG SL tablet Place 1 tablet under the tongue every 5 minutes as needed for Chest pain.  10/14/14   Maribeth Hayden MD       Current medications:    Current Facility-Administered Medications   Medication Dose Route Frequency Provider Last Rate Last Admin    sodium chloride flush 0.9 % injection 10 mL  10 mL Intravenous 2 times per day Gianmarino C Gianfrate, DO        sodium chloride flush 0.9 % injection 10 mL  10 mL Intravenous PRN Gianmarino C Gianfrate, DO        0.9 % sodium chloride infusion  25 mL Intravenous PRN Gianmarino C Gianfrate, DO        ondansetron (ZOFRAN-ODT) disintegrating tablet 4 mg  4 mg Oral Q8H PRN Gianmarino C Gianfrate, DO        Or    ondansetron (ZOFRAN) injection 4 mg  4 mg Intravenous Q6H PRN Gianmarino C Gianfrate, DO        [Held by provider] polyethylene glycol (GLYCOLAX) packet 17 g  17 g Oral Daily Gianmarino C Gianfrate, DO        fleet rectal enema 1 enema  1 enema Rectal Daily PRN Gianmarino C Gianfrate, DO        acetaminophen (TYLENOL) tablet 650 mg  650 mg Oral Q6H Gianmarino C Gianfrate, DO        0.9 % sodium chloride infusion   Intravenous Continuous Gianmarino C Gianfrate,  mL/hr at 06/25/21 0130 New Bag at 06/25/21 0130    levETIRAcetam (KEPPRA) 500 mg/100 mL IVPB  500 mg Intravenous Q12H Gianmarino C Gianfrate, DO   Stopped at 06/25/21 0222    atorvastatin (LIPITOR) tablet 20 mg  20 mg Oral Nightly Gianmarino C Gianfrate, DO        metoprolol succinate (TOPROL XL) extended release tablet 100 mg  100 mg Oral Daily Gianmarino C Gianfrate, DO        probenecid (BENEMID) tablet 500 mg  500 mg Oral Daily Sergio Davis MD        And    colchicine (COLCRYS) tablet 0.6 mg  0.6 mg Oral Daily Sergio Davis MD        allopurinol (ZYLOPRIM) tablet 300 mg  300 mg Oral Daily Sergio Davis MD        methIMAzole (TAPAZOLE) tablet 7.5 mg  7.5 mg Oral Daily Sergio Davis MD           Allergies:  No Known Allergies    Problem List:    Patient Active Problem List   Diagnosis Code    CKD (chronic kidney disease) stage 3, GFR 30-59 ml/min N18.30    NSTEMI (non-ST elevated myocardial infarction) (HCC) I21.4    Hyperthyroidism E05.90    History of pulmonary embolism Z86.711    History of gout Z87.39    Thyroid cyst E04.1    Bilateral renal cysts N28.1    Coronary artery disease involving native coronary artery of native heart I25.10    Fall at home, sequela W19. My Carr, Y92.009    SDH (subdural hematoma) ([address] U52.5R7D       Past Medical History: Diagnosis Date    Bilateral renal cysts 10/13/2014    CAD (coronary artery disease)     Cardiomyopathy ([facility] [address]     Chest pain     8-4-2016 lexiscan stress    History of gout 10/13/2014    History of pulmonary embolism 10/13/2014    Hyperlipidemia     Hypertension     Hyperthyroidism 10/13/2014    NSTEMI (non-ST elevated myocardial infarction) ([facility] [address] 10/13/2014    Obesity due to excess calories     Thyroid cyst 10/13/2014    Thyroid disease        Past Surgical History:        Procedure Laterality Date    BACK SURGERY      CORONARY ANGIOPLASTY WITH STENT PLACEMENT  10-    Dr. Zaina Moise 3.0x18 MID LAD    DIAGNOSTIC CARDIAC CATH LAB PROCEDURE  10/13/2014    Dr. Crispin Sanz: PCI to LAD       Social History:    Social History     Tobacco Use    Smoking status: Never Smoker    Smokeless tobacco: Never Used   Substance Use Topics    Alcohol use: Yes     Comment: occasional. No caffeine                                Counseling given: Not Answered      Vital Signs (Current):   Vitals:    06/25/21 0330 06/25/21 0400 06/25/21 0500 06/25/21 0600   BP: 108/67 131/70 127/75 130/71   Pulse: 66 64 66 62   Resp: 21 25 17 25   Temp:  36.6 °C (97.8 °F)     TempSrc:  Axillary     SpO2: 94% 99% 95% 100%   Weight:    254 lb (115.2 kg)   Height:                                                  BP Readings from Last 3 Encounters:   06/25/21 130/71   06/24/21 112/75   10/23/20 124/70       NPO Status:                           > 12 hrs                                                      BMI:   Wt Readings from Last 3 Encounters:   06/25/21 254 lb (115.2 kg)   06/24/21 248 lb (112.5 kg)   10/23/20 248 lb (112.5 kg)     Body mass index is 34.45 kg/m².     CBC:   Lab Results   Component Value Date    WBC 6.0 06/25/2021    RBC 4.92 06/25/2021    HGB 15.8 06/25/2021    HCT 48.5 06/25/2021    MCV 98.6 06/25/2021    RDW 13.8 06/25/2021     06/25/2021       CMP:   Lab Results   Component Value Date    NA 135 06/25/2021    K 4.0 06/25/2021     06/25/2021    CO2 21 06/25/2021    BUN 29 06/25/2021    CREATININE 1.3 06/25/2021    GFRAA >60 06/25/2021    LABGLOM 54 06/25/2021    GLUCOSE 87 06/25/2021    GLUCOSE 92 02/10/2012    PROT 6.8 06/24/2021    CALCIUM 9.4 06/25/2021    BILITOT 1.2 06/24/2021    ALKPHOS 85 06/24/2021    AST 25 06/24/2021    ALT 20 06/24/2021       POC Tests: No results for input(s): POCGLU, POCNA, POCK, POCCL, POCBUN, POCHEMO, POCHCT in the last 72 hours.     Coags:   Lab Results   Component Value Date    PROTIME 14.8 06/24/2021    INR 1.3 06/24/2021    APTT 29.2 06/24/2021       HCG (If Applicable): No results found for: PREGTESTUR, PREGSERUM, HCG, HCGQUANT     ABGs: No results found for: PHART, PO2ART, EBN4OVN, NYQ2RWA, BEART, A5TGMEUE     Type & Screen (If Applicable):  No results found for: LABABO, LABRH    Drug/Infectious Status (If Applicable):  Lab Results   Component Value Date    HEPCAB NON REACT 05/31/2011       COVID-19 Screening (If Applicable):   Lab Results   Component Value Date    COVID19 Not Detected 06/24/2021     CT Head 6/24/21  FINDINGS:   BRAIN/VENTRICLES: There is a extra-axial mixed attenuated fluid collection   seen along the right frontal convexity measuring 9 cm and transverse diameter   by 2.8 cm in AP diameter by 7 cm in craniocaudal dimension.  This finding   represents an acute on chronic subdural hematoma.  There is hyperdense   material within the extra-axial subdural hematoma consistent with a mixture   of acute blood.  There is effacement of the right frontal lobe.       There is no intraparenchymal hemorrhage.  There is no evidence of   hydrocephalus.  There is no midline shift.       The ventricles, cisterns and sulci are prominent consistent with atrophy.       There is an extra-axial CSF density focus along the anterior aspect of the   left temporal lobe consistent with an arachnoid cyst.       ORBITS: The visualized portion of the orbits demonstrate no acute abnormality.       SINUSES: The visualized paranasal sinuses and mastoid air cells demonstrate   no acute abnormality.       SOFT TISSUES/SKULL:  No acute abnormality of the visualized skull or soft   tissues.           Impression   1. 9 cm x 7 cm x 2.8 cm extra-axial mixed attenuated fluid collection seen   within the right frontal convexity consistent with a acute on chronic   subdural hematoma. 2. There is effacement of the right frontal lobe   3. There is no midline shift. 4. Left temporal fossa arachnoid cyst   5. This report was called to the emergency room following dictation. EKG 6/24/21  Narrative & Impression    Sinus rhythm with 1st degree AV block  Otherwise normal ECG  When compared with ECG of 03-AUG-2016 07:15,  No significant change was found    Nuc Med Cardiac 8/4/2016  Impression   Myocardial perfusion SPECT, within normal limits.  Stress   induced ischemia is not demonstrated.       LV Wall Motion       Gated wall motion exam was performed in conjunction with cardiac SPECT   imaging. There is fairly symmetric wall motion noted.       IMPRESSION: Ventricular wall motion within normal limits.               LV Ejection Fraction       Left ventricular ejection fraction calculation was performed in   conjunction with cardiac SPECT imaging.  The LVEF is calculated at 64   %.       IMPRESSION: LVEF equals 64 %.          ECHO 2015  Left ventricle with mild concentric hypertrophy  Normal Left ventricular systolic function  Stage 1 Diastolic dysfunction  Mild mitral regurgitation  EF estimated at 55%    Anesthesia Evaluation  Patient summary reviewed and Nursing notes reviewed no history of anesthetic complications:   Airway: Mallampati: III  TM distance: >3 FB   Neck ROM: full  Mouth opening: > = 3 FB Dental:          Pulmonary: breath sounds clear to auscultation      (-) shortness of breath and not a current smoker                           Cardiovascular:    (+) hypertension:, past MI:, CAD:, CABG/stent (stent x1 in 2014 to LAD ):, hyperlipidemia      ECG reviewed  Rhythm: regular  Rate: normal  Echocardiogram reviewed  Stress test reviewed       Beta Blocker:  Dose within 24 Hrs        PE comment: SR with 1st degree AVB on monitor     Neuro/Psych:                ROS comment: Chronic lumbar pain  Patient states \"lower back surgery\", unsure of when      CT scan found to have an acute on chronic subdural hematoma GI/Hepatic/Renal: Neg GI/Hepatic/Renal ROS            Endo/Other:    (+) hyperthyroidism (1.2 cm left thyroid nodule on 2014 CT ), blood dyscrasia (on Plavix, last taken 6/24/21 in AM)::., .                 Abdominal:           Vascular:   + DVT (hx of DVT), . Anesthesia Plan      MAC     ASA 4     (Surgeon requesting [facility])  Induction: intravenous. Anesthetic plan and risks discussed with patient. Use of blood products discussed with patient whom consented to blood products. Plan discussed with CRNA and attending. Susan Mckeon RN   6/25/2021      Pt seen, examined, chart reviewed, plan discussed.   Asif Lindsey MD  6/25/2021  9:35 AM

## 2021-06-25 NOTE — PROGRESS NOTES
Patient seen and examined  Informed consent obtained from patient to proceed with ellie hole evacuation and placement drain  To OR this am

## 2021-06-26 ENCOUNTER — APPOINTMENT (OUTPATIENT)
Dept: CT IMAGING | Age: 76
DRG: 026 | End: 2021-06-26
Payer: MEDICARE

## 2021-06-26 LAB
ANION GAP SERPL CALCULATED.3IONS-SCNC: 13 MMOL/L (ref 7–16)
BUN BLDV-MCNC: 27 MG/DL (ref 6–23)
CALCIUM SERPL-MCNC: 8.9 MG/DL (ref 8.6–10.2)
CHLORIDE BLD-SCNC: 105 MMOL/L (ref 98–107)
CO2: 19 MMOL/L (ref 22–29)
CREAT SERPL-MCNC: 1.3 MG/DL (ref 0.7–1.2)
FOLATE: 9 NG/ML (ref 4.8–24.2)
GFR AFRICAN AMERICAN: >60
GFR NON-AFRICAN AMERICAN: 54 ML/MIN/1.73
GLUCOSE BLD-MCNC: 103 MG/DL (ref 74–99)
HCT VFR BLD CALC: 46.6 % (ref 37–54)
HEMOGLOBIN: 15.2 G/DL (ref 12.5–16.5)
MCH RBC QN AUTO: 32.1 PG (ref 26–35)
MCHC RBC AUTO-ENTMCNC: 32.6 % (ref 32–34.5)
MCV RBC AUTO: 98.5 FL (ref 80–99.9)
PDW BLD-RTO: 13.8 FL (ref 11.5–15)
PLATELET # BLD: 87 E9/L (ref 130–450)
PLATELET CONFIRMATION: NORMAL
PMV BLD AUTO: 11.4 FL (ref 7–12)
POTASSIUM REFLEX MAGNESIUM: 4.3 MMOL/L (ref 3.5–5)
RBC # BLD: 4.73 E12/L (ref 3.8–5.8)
SODIUM BLD-SCNC: 137 MMOL/L (ref 132–146)
URINE CULTURE, ROUTINE: NORMAL
VITAMIN B-12: 363 PG/ML (ref 211–946)
WBC # BLD: 5.9 E9/L (ref 4.5–11.5)

## 2021-06-26 PROCEDURE — 2500000003 HC RX 250 WO HCPCS: Performed by: NEUROLOGICAL SURGERY

## 2021-06-26 PROCEDURE — 6370000000 HC RX 637 (ALT 250 FOR IP): Performed by: NEUROLOGICAL SURGERY

## 2021-06-26 PROCEDURE — 6360000002 HC RX W HCPCS: Performed by: NEUROLOGICAL SURGERY

## 2021-06-26 PROCEDURE — 70450 CT HEAD/BRAIN W/O DYE: CPT

## 2021-06-26 PROCEDURE — 2580000003 HC RX 258: Performed by: NEUROLOGICAL SURGERY

## 2021-06-26 PROCEDURE — 80048 BASIC METABOLIC PNL TOTAL CA: CPT

## 2021-06-26 PROCEDURE — 99233 SBSQ HOSP IP/OBS HIGH 50: CPT | Performed by: SURGERY

## 2021-06-26 PROCEDURE — 82607 VITAMIN B-12: CPT

## 2021-06-26 PROCEDURE — 36415 COLL VENOUS BLD VENIPUNCTURE: CPT

## 2021-06-26 PROCEDURE — 6370000000 HC RX 637 (ALT 250 FOR IP): Performed by: NURSE PRACTITIONER

## 2021-06-26 PROCEDURE — 6370000000 HC RX 637 (ALT 250 FOR IP): Performed by: STUDENT IN AN ORGANIZED HEALTH CARE EDUCATION/TRAINING PROGRAM

## 2021-06-26 PROCEDURE — 85027 COMPLETE CBC AUTOMATED: CPT

## 2021-06-26 PROCEDURE — 82746 ASSAY OF FOLIC ACID SERUM: CPT

## 2021-06-26 PROCEDURE — 2000000000 HC ICU R&B

## 2021-06-26 RX ORDER — LEVETIRACETAM 500 MG/1
500 TABLET ORAL 2 TIMES DAILY
Status: DISCONTINUED | OUTPATIENT
Start: 2021-06-26 | End: 2021-06-30 | Stop reason: HOSPADM

## 2021-06-26 RX ORDER — MECOBALAMIN 5000 MCG
5 TABLET,DISINTEGRATING ORAL NIGHTLY PRN
Status: DISCONTINUED | OUTPATIENT
Start: 2021-06-27 | End: 2021-06-30 | Stop reason: HOSPADM

## 2021-06-26 RX ORDER — BUTALBITAL, ACETAMINOPHEN AND CAFFEINE 50; 325; 40 MG/1; MG/1; MG/1
2 TABLET ORAL EVERY 4 HOURS PRN
Status: DISCONTINUED | OUTPATIENT
Start: 2021-06-26 | End: 2021-06-30 | Stop reason: HOSPADM

## 2021-06-26 RX ORDER — ACETAMINOPHEN 325 MG/1
650 TABLET ORAL EVERY 6 HOURS PRN
Status: DISCONTINUED | OUTPATIENT
Start: 2021-06-26 | End: 2021-06-30 | Stop reason: HOSPADM

## 2021-06-26 RX ADMIN — LEVETIRACETAM 500 MG: 500 TABLET ORAL at 21:40

## 2021-06-26 RX ADMIN — METOPROLOL SUCCINATE 100 MG: 50 TABLET, EXTENDED RELEASE ORAL at 09:43

## 2021-06-26 RX ADMIN — Medication 2000 MG: at 09:58

## 2021-06-26 RX ADMIN — PROBENECID 500 MG: 500 TABLET, FILM COATED ORAL at 09:46

## 2021-06-26 RX ADMIN — ATORVASTATIN CALCIUM 20 MG: 20 TABLET, FILM COATED ORAL at 21:40

## 2021-06-26 RX ADMIN — ALLOPURINOL 300 MG: 300 TABLET ORAL at 09:45

## 2021-06-26 RX ADMIN — Medication 2000 MG: at 01:10

## 2021-06-26 RX ADMIN — DIPHENHYDRAMINE HCL 50 MG: 25 TABLET ORAL at 02:30

## 2021-06-26 RX ADMIN — Medication 10 ML: at 09:48

## 2021-06-26 RX ADMIN — BUTALBITAL, ACETAMINOPHEN, AND CAFFEINE 1 TABLET: 50; 325; 40 TABLET ORAL at 05:57

## 2021-06-26 RX ADMIN — Medication 2000 MG: at 17:06

## 2021-06-26 RX ADMIN — BUTALBITAL, ACETAMINOPHEN, AND CAFFEINE 2 TABLET: 50; 325; 40 TABLET ORAL at 19:27

## 2021-06-26 RX ADMIN — BUTALBITAL, ACETAMINOPHEN, AND CAFFEINE 2 TABLET: 50; 325; 40 TABLET ORAL at 13:01

## 2021-06-26 RX ADMIN — ACETAMINOPHEN 650 MG: 325 TABLET ORAL at 07:01

## 2021-06-26 RX ADMIN — POLYETHYLENE GLYCOL 3350 17 G: 17 POWDER, FOR SOLUTION ORAL at 09:44

## 2021-06-26 RX ADMIN — LEVETIRACETAM 500 MG: 5 INJECTION, SOLUTION INTRAVENOUS at 13:43

## 2021-06-26 RX ADMIN — BUTALBITAL, ACETAMINOPHEN, AND CAFFEINE 1 TABLET: 50; 325; 40 TABLET ORAL at 01:40

## 2021-06-26 RX ADMIN — Medication 10 ML: at 21:46

## 2021-06-26 RX ADMIN — COLCHICINE 0.6 MG: 0.6 TABLET ORAL at 09:47

## 2021-06-26 RX ADMIN — LEVETIRACETAM 500 MG: 5 INJECTION, SOLUTION INTRAVENOUS at 01:45

## 2021-06-26 RX ADMIN — ACETAMINOPHEN 650 MG: 325 TABLET ORAL at 01:40

## 2021-06-26 ASSESSMENT — PAIN DESCRIPTION - LOCATION
LOCATION: HEAD
LOCATION: HEAD

## 2021-06-26 ASSESSMENT — PAIN DESCRIPTION - PROGRESSION
CLINICAL_PROGRESSION: GRADUALLY IMPROVING
CLINICAL_PROGRESSION: GRADUALLY WORSENING
CLINICAL_PROGRESSION: GRADUALLY IMPROVING
CLINICAL_PROGRESSION: GRADUALLY WORSENING

## 2021-06-26 ASSESSMENT — PAIN SCALES - GENERAL
PAINLEVEL_OUTOF10: 5
PAINLEVEL_OUTOF10: 6
PAINLEVEL_OUTOF10: 0
PAINLEVEL_OUTOF10: 0
PAINLEVEL_OUTOF10: 5
PAINLEVEL_OUTOF10: 3
PAINLEVEL_OUTOF10: 5
PAINLEVEL_OUTOF10: 0
PAINLEVEL_OUTOF10: 6
PAINLEVEL_OUTOF10: 0
PAINLEVEL_OUTOF10: 2
PAINLEVEL_OUTOF10: 0

## 2021-06-26 ASSESSMENT — PAIN DESCRIPTION - ONSET
ONSET: ON-GOING
ONSET: AWAKENED FROM SLEEP

## 2021-06-26 ASSESSMENT — PAIN DESCRIPTION - FREQUENCY
FREQUENCY: CONTINUOUS
FREQUENCY: INTERMITTENT

## 2021-06-26 ASSESSMENT — PAIN - FUNCTIONAL ASSESSMENT
PAIN_FUNCTIONAL_ASSESSMENT: ACTIVITIES ARE NOT PREVENTED

## 2021-06-26 ASSESSMENT — PAIN DESCRIPTION - PAIN TYPE
TYPE: ACUTE PAIN
TYPE: ACUTE PAIN

## 2021-06-26 ASSESSMENT — PAIN DESCRIPTION - DESCRIPTORS
DESCRIPTORS: SORE
DESCRIPTORS: SORE

## 2021-06-26 ASSESSMENT — PAIN DESCRIPTION - ORIENTATION
ORIENTATION: RIGHT
ORIENTATION: RIGHT

## 2021-06-26 NOTE — PROGRESS NOTES
Trauma Tertiary Survey    Admit Date: 6/24/2021  Hospital day 1    CC:  Multiple falls from standing height. Past Medical History:   Diagnosis Date    Bilateral renal cysts 10/13/2014    CAD (coronary artery disease)     [facility]     Chest pain     8-4-2016 lexiscan stress    History of gout 10/13/2014    History of pulmonary embolism 10/13/2014    Hyperlipidemia     Hypertension     Hyperthyroidism 10/13/2014    NSTEMI (non-ST elevated myocardial infarction) ([facility] 10/13/2014    Obesity due to excess calories     Thyroid cyst 10/13/2014    Thyroid disease        Alcohol pre-screening:  Men: How many times in the past year have you had 5 or more drinks in a day?  none    Scheduled Meds:   sodium chloride flush  10 mL Intravenous 2 times per day    [Held by provider] polyethylene glycol  17 g Oral Daily    acetaminophen  650 mg Oral Q6H    levetiracetam  500 mg Intravenous Q12H    atorvastatin  20 mg Oral Nightly    metoprolol succinate  100 mg Oral Daily    probenecid  500 mg Oral Daily    And    colchicine  0.6 mg Oral Daily    ceFAZolin  2,000 mg Intravenous Q8H    allopurinol  300 mg Oral Daily    methIMAzole  7.5 mg Oral Daily     Continuous Infusions:   sodium chloride      sodium chloride 100 mL/hr at 06/25/21 2300     PRN Meds:sodium chloride flush, sodium chloride, ondansetron **OR** ondansetron, fleet, diphenhydrAMINE, polyethylene glycol, ondansetron **OR** ondansetron, butalbital-acetaminophen-caffeine    Subjective:     Patient states his LUE and face numbness resolved and he feels OK. He denies headache.      Objective:     Patient Vitals for the past 8 hrs:   BP Temp Temp src Pulse Resp SpO2   06/25/21 2300 126/66 -- -- 56 22 --   06/25/21 2200 129/68 -- -- 74 23 --   06/25/21 2100 130/70 -- -- 63 17 --   06/25/21 2000 133/67 -- -- 63 24 95 %   06/25/21 1900 116/63 -- -- 67 15 96 %   06/25/21 1800 135/67 -- -- 62 20 99 %   06/25/21 1730 132/70 -- -- 58 22 98 % 06/25/21 1700 116/86 -- -- 62 24 96 %   06/25/21 1630 111/65 -- -- 60 20 95 %   06/25/21 1600 123/72 97.2 °F (36.2 °C) Temporal 63 23 98 %       I/O last 3 completed shifts: In: 2990 [P.O.:1000; I.V.:1790; IV Piggyback:200]  Out: 3086 [EKNCA:6972; Drains:50]  No intake/output data recorded. Past Medical History:   Diagnosis Date    Bilateral renal cysts 10/13/2014    CAD (coronary artery disease)     Cardiomyopathy ([facility]     Chest pain     8-4-2016 lexiscan stress    History of gout 10/13/2014    History of pulmonary embolism 10/13/2014    Hyperlipidemia     Hypertension     Hyperthyroidism 10/13/2014    NSTEMI (non-ST elevated myocardial infarction) ([facility] 10/13/2014    Obesity due to excess calories     Thyroid cyst 10/13/2014    Thyroid disease        Radiology:  CT HEAD WO CONTRAST   Final Result   Right frontal acute on chronic subdural hematoma, unchanged since the prior   examination. CT Head wo Contrast    (Results Pending)       PHYSICAL EXAM:   GCS:  4 - Opens eyes on own   6 - Follows simple motor commands  5 - Alert and oriented    Pupil size:  Left 0 mm Right 0 mm  Pupil reaction: Yes  Wiggles fingers: Left Yes Right Yes  Hand grasp:   Left decreased   Right normal  Wiggles toes: Left Yes    Right Yes  Plantar flexion: Left normal  Right normal    PHYSICAL EXAM  General: No apparent distress, comfortable   HEENT: Trachea midline, no masses, Pupils equal round   Chest: Respiratory effort was normal with no retractions or use of accessory muscles. Cardiovascular: Extremities warm, well perfused,   Abdomen:  Soft and non distended. No tenderness, guarding, rebound, or rigidity   Extremities: Moves all 4 extremeties, No pedal edema.  LUE slightly weaker than right    Spine:     Spine Tenderness ROM   Cervical 0 /10 Normal   Thoracic 0 /10 Normal   Lumbar 0 /10 Normal     Musculoskeletal    Joint Tenderness Swelling ROM   Right shoulder absent absent normal   Left shoulder absent absent normal   Right elbow absent absent normal   Left elbow absent absent normal   Right wrist absent absent normal   Left wrist absent absent normal   Right hand grasp absent absent normal   Left hand grasp absent absent normal   Right hip absent absent normal   Left hip absent absent normal   Right knee absent absent normal   Left knee absent absent normal   Right ankle absent absent normal   Left ankle absent absent normal   Right foot absent absent normal   Left foot absent absent normal       CONSULTS:  Neurosurgery. PROCEDURES:     INJURIES:        Active Problems:    Fall at home, sequela    SDH (subdural hematoma) (HCC)  Resolved Problems:    * No resolved hospital problems. *        Assessment/Plan:       · Neuro:  Acute on chronic SDH. Neurosurgery consulted, for burrhole today. MEREDITH Monten[place] checks. Tylenol and fioricet for headache prn  · CV: No acute issues,Hx CAD, cardiomyopathy on home plavix  · Pulm: No acute issues, monitor RR and SpO2. Pulmonary toilet  · GI: No acute issues, OK for diet after procedure if passes swallow evaluation  · Renal: CKD, monitor UOP, Cr downtrending 1.3 from 1.7. Baseline 1.5-1.9  · ID: No indication for abx    · Endocrine: Glucose WNL  · MSK: PT/OT when able   · Heme: On plavix, will monitor plts. Bowel regime: glycolax  Pain control/Sedation: tylenol, fioricet  DVT prophylaxis: SCDs    GI: General diet  Glucose protocol: N/A  Mouth/Eye care: as per patient.    Rios: N/A    Code status:    Full Code    Patient/Family update:  As able    Disposition:  Continue ICU      Electronically signed by Yohan Joseph MD on 6/25/21 at 11:31 PM EDT

## 2021-06-26 NOTE — PLAN OF CARE
Problem: Falls - Risk of:  Goal: Will remain free from falls  Description: Will remain free from falls  Outcome: Met This Shift  Goal: Absence of physical injury  Description: Absence of physical injury  Outcome: Met This Shift     Problem: HEMODYNAMIC STATUS  Goal: Patient has stable vital signs and fluid balance  Outcome: Met This Shift     Problem: ACTIVITY INTOLERANCE/IMPAIRED MOBILITY  Goal: Mobility/activity is maintained at optimum level for patient  Outcome: Ongoing     Problem: COMMUNICATION IMPAIRMENT  Goal: Ability to express needs and understand communication  Outcome: Met This Shift     Problem: Skin Integrity:  Goal: Will show no infection signs and symptoms  Description: Will show no infection signs and symptoms  Outcome: Met This Shift  Goal: Absence of new skin breakdown  Description: Absence of new skin breakdown  Outcome: Met This Shift

## 2021-06-26 NOTE — PROGRESS NOTES
Neuro Science Intensive Care Unit  Critical Care  Daily Progress Note 6/26/2021    Date of Admission: 6/25/21    CC: Critical care management. HOSPITAL EVENTS  6/24 presented with chief complaint of multiple falls. Diagnostic work-up revealed acute on chronic subdural hematoma. 6-25 right frontal bur hole craniotomy drain hematoma and placement of drain. 6/26 no significant overnight issues. Complains of headache     OVERNIGHT EVENTS: T max 98 F. No additional doses of antihypertensive agents in the previous 24 hours. PHYSICAL EXAM:    /87   Pulse 60   Temp 97.8 °F (36.6 °C) (Oral)   Resp 15   Ht 6' (1.829 m)   Wt 252 lb 9.6 oz (114.6 kg)   SpO2 93%   BMI 34.26 kg/m²     Intake/Output Summary (Last 24 hours) at 6/26/2021 1817  Last data filed at 6/26/2021 1500  Gross per 24 hour   Intake 1810 ml   Output 1425 ml   Net 385 ml         General appearance:  Comfortable. NEUROLOGIC:        GCS:    4 - Opens eyes on own   6 - Follows simple motor commands  5 - Alert and oriented       Pupil size:  Left 3 mm  Right 3 mm  Pupil reaction: Yes   PERRLA  Wiggles fingers: Left Yes Right Yes  Hand grasp:   Left present     Right present  Wiggles toes: Left Yes    Right Yes  Plantar flexion: Left present    Right present  Facial droop:   None   speech:  Clear      SDH drainage: Serosanguineous Previous 8 hour: 0 ml  Previous 24 hour: 50 ml      CONSTITUTIONAL: No acute distress  CARDIOVASCULAR: S1 S2, regular rate, regular rhythm,Monitor:   PULMONARY:  Respirations unlabored. No rhonchi/rales/wheezes. ABDOMEN: Soft, nontender, nondistended, nontympanic, normal bowel sounds. MUSCULOSKELETAL: No focal deficit  SKIN/EXTREMITIES: No rashes/ecchymosis, no edema/clubbing, warm/dry, good capillary refill. IV ACCESS: IV      ASSESSMENT/PLAN:     Active Problems:    Fall at home, sequela    SDH (subdural hematoma) (HCC)  Resolved Problems:    * No resolved hospital problems.  *          Neuro: Status post right frontal ellie hole craniotomy evacuation of SDH with drain placement. Neurosurgery following. Monitor neuro status. Keppra  CV: No acute issues. Hx HLD.  HTN. Non-STEMI.  CAD. Cardiomyopathy. BP goal <150 mm Hg. Lipitor. Metoprolol. Pulm: No acute issues per monitor respiratory status  GI: No acute issues. Tolerating diet. Monitor bowel status  Renal: Serum creatinine at 1.3 mg/dL consistent with prior levels. Monitor uop, renal function and electrolytes  ID: No leukocytosis. Afebrile. Endocrine: Hyperglycemia. Hx thyroid disease. Tapazole. Follow labs. MSK:. No focal deficits. Hx gout PT OT. Colchicine. Zyloprim  Heme: No acute issues. Monitor CBC        Bowel regime: GlycoLax. Pain control/Sedation:  Acetaminophen Fioricet. DVT prophylaxis: SCDs. GI prophylaxis: Diet  Glucose protocol:. Follow lab  Mouth/Eye care: As needed  Consults: Trauma. Neurosurgery. Patient/Family update: Patient and wife updated on status questions answered  Code status: Full      Disposition:  NSICU. Kobe Tenorio DNP.  APRN-CNP  6/26/2021  10:57 AM

## 2021-06-26 NOTE — PROGRESS NOTES
Neurosurg progress note  VITALS:  /72   Pulse 59   Temp 97.8 °F (36.6 °C) (Oral)   Resp 17   Ht 6' (1.829 m)   Wt 252 lb 9.6 oz (114.6 kg)   SpO2 93%   BMI 34.26 kg/m²   24HR INTAKE/OUTPUT:    Intake/Output Summary (Last 24 hours) at 6/26/2021 3647  Last data filed at 6/26/2021 0800  Gross per 24 hour   Intake 3590 ml   Output 1600 ml   Net 1990 ml     XR CHEST (2 VW)    Result Date: 6/24/2021  EXAMINATION: TWO XRAY VIEWS OF THE CHEST 6/24/2021 3:07 pm COMPARISON: The/3/16. HISTORY: ORDERING SYSTEM PROVIDED HISTORY: chest pain TECHNOLOGIST PROVIDED HISTORY: Reason for exam:->chest pain FINDINGS: The lungs are without acute focal process. There is no effusion or pneumothorax. The cardiomediastinal silhouette is without acute process. The osseous structures are without acute process. No acute process. CT HEAD WO CONTRAST    Result Date: 6/24/2021  EXAMINATION: CT OF THE HEAD WITHOUT CONTRAST  6/24/2021 7:40 pm TECHNIQUE: CT of the head was performed without the administration of intravenous contrast. Dose modulation, iterative reconstruction, and/or weight based adjustment of the mA/kV was utilized to reduce the radiation dose to as low as reasonably achievable. COMPARISON: 1523 hours HISTORY: ORDERING SYSTEM PROVIDED HISTORY: follow-up CT from 1524 TECHNOLOGIST PROVIDED HISTORY: Has a \"code stroke\" or \"stroke alert\" been called? ->No Reason for exam:->follow-up CT from 1524 Decision Support Exception - unselect if not a suspected or confirmed emergency medical condition->Emergency Medical Condition (MA) What reading provider will be dictating this exam?->CRC FINDINGS: Heterogeneous right frontal extra-axial collection consistent with acute on chronic subdural hematoma unchanged since the prior examination. Mass effect on the right frontal lobe, unchanged. No new areas of intracranial hemorrhage or hematoma. Age-related loss of brain volume and chronic periventricular ischemic changes again noted. Left temporal fossa arachnoid cyst again noted. 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 Right frontal acute on chronic subdural hematoma, unchanged since the prior examination. CT HEAD WO CONTRAST    Result Date: 6/24/2021  EXAMINATION: CT OF THE HEAD WITHOUT CONTRAST  6/24/2021 3:18 pm TECHNIQUE: CT of the head was performed without the administration of intravenous contrast. Dose modulation, iterative reconstruction, and/or weight based adjustment of the mA/kV was utilized to reduce the radiation dose to as low as reasonably achievable. COMPARISON: None. HISTORY: ORDERING SYSTEM PROVIDED HISTORY: dizziness TECHNOLOGIST PROVIDED HISTORY: Has a \"code stroke\" or \"stroke alert\" been called? ->No Reason for exam:->dizziness Decision Support Exception - unselect if not a suspected or confirmed emergency medical condition->Emergency Medical Condition (MA) FINDINGS: BRAIN/VENTRICLES: There is a extra-axial mixed attenuated fluid collection seen along the right frontal convexity measuring 9 cm and transverse diameter by 2.8 cm in AP diameter by 7 cm in craniocaudal dimension. This finding represents an acute on chronic subdural hematoma. There is hyperdense material within the extra-axial subdural hematoma consistent with a mixture of acute blood. There is effacement of the right frontal lobe. There is no intraparenchymal hemorrhage. There is no evidence of hydrocephalus. There is no midline shift. The ventricles, cisterns and sulci are prominent consistent with atrophy. There is an extra-axial CSF density focus along the anterior aspect of the left temporal lobe consistent with an arachnoid cyst. ORBITS: The visualized portion of the orbits demonstrate no acute abnormality. SINUSES: The visualized paranasal sinuses and mastoid air cells demonstrate no acute abnormality. SOFT TISSUES/SKULL:  No acute abnormality of the visualized skull or soft tissues.      1. 9 Plan:Continue current care  Kiah Grossman MD M.D.

## 2021-06-27 LAB
ANION GAP SERPL CALCULATED.3IONS-SCNC: 10 MMOL/L (ref 7–16)
BUN BLDV-MCNC: 24 MG/DL (ref 6–23)
CALCIUM SERPL-MCNC: 8.9 MG/DL (ref 8.6–10.2)
CHLORIDE BLD-SCNC: 106 MMOL/L (ref 98–107)
CO2: 21 MMOL/L (ref 22–29)
CREAT SERPL-MCNC: 1.3 MG/DL (ref 0.7–1.2)
GFR AFRICAN AMERICAN: >60
GFR NON-AFRICAN AMERICAN: 54 ML/MIN/1.73
GLUCOSE BLD-MCNC: 107 MG/DL (ref 74–99)
HCT VFR BLD CALC: 46.4 % (ref 37–54)
HEMOGLOBIN: 15.3 G/DL (ref 12.5–16.5)
MCH RBC QN AUTO: 32.1 PG (ref 26–35)
MCHC RBC AUTO-ENTMCNC: 33 % (ref 32–34.5)
MCV RBC AUTO: 97.3 FL (ref 80–99.9)
PDW BLD-RTO: 13.7 FL (ref 11.5–15)
PLATELET # BLD: 95 E9/L (ref 130–450)
PLATELET CONFIRMATION: NORMAL
PMV BLD AUTO: 10.5 FL (ref 7–12)
POTASSIUM REFLEX MAGNESIUM: 4.1 MMOL/L (ref 3.5–5)
RBC # BLD: 4.77 E12/L (ref 3.8–5.8)
SODIUM BLD-SCNC: 137 MMOL/L (ref 132–146)
WBC # BLD: 5.5 E9/L (ref 4.5–11.5)

## 2021-06-27 PROCEDURE — 6360000002 HC RX W HCPCS: Performed by: NEUROLOGICAL SURGERY

## 2021-06-27 PROCEDURE — 6370000000 HC RX 637 (ALT 250 FOR IP): Performed by: SURGERY

## 2021-06-27 PROCEDURE — 85027 COMPLETE CBC AUTOMATED: CPT

## 2021-06-27 PROCEDURE — 99233 SBSQ HOSP IP/OBS HIGH 50: CPT | Performed by: NURSE PRACTITIONER

## 2021-06-27 PROCEDURE — 36415 COLL VENOUS BLD VENIPUNCTURE: CPT

## 2021-06-27 PROCEDURE — 6370000000 HC RX 637 (ALT 250 FOR IP): Performed by: NEUROLOGICAL SURGERY

## 2021-06-27 PROCEDURE — 2580000003 HC RX 258: Performed by: NEUROLOGICAL SURGERY

## 2021-06-27 PROCEDURE — 6370000000 HC RX 637 (ALT 250 FOR IP): Performed by: NURSE PRACTITIONER

## 2021-06-27 PROCEDURE — 2000000000 HC ICU R&B

## 2021-06-27 PROCEDURE — 80048 BASIC METABOLIC PNL TOTAL CA: CPT

## 2021-06-27 PROCEDURE — 2500000003 HC RX 250 WO HCPCS: Performed by: NEUROLOGICAL SURGERY

## 2021-06-27 RX ADMIN — BUTALBITAL, ACETAMINOPHEN, AND CAFFEINE 2 TABLET: 50; 325; 40 TABLET ORAL at 00:37

## 2021-06-27 RX ADMIN — BUTALBITAL, ACETAMINOPHEN, AND CAFFEINE 2 TABLET: 50; 325; 40 TABLET ORAL at 06:00

## 2021-06-27 RX ADMIN — METHIMAZOLE 7.5 MG: 5 TABLET ORAL at 07:47

## 2021-06-27 RX ADMIN — Medication 5 MG: at 00:59

## 2021-06-27 RX ADMIN — BUTALBITAL, ACETAMINOPHEN, AND CAFFEINE 2 TABLET: 50; 325; 40 TABLET ORAL at 20:23

## 2021-06-27 RX ADMIN — ATORVASTATIN CALCIUM 20 MG: 20 TABLET, FILM COATED ORAL at 20:24

## 2021-06-27 RX ADMIN — Medication 2000 MG: at 17:39

## 2021-06-27 RX ADMIN — METOPROLOL SUCCINATE 100 MG: 50 TABLET, EXTENDED RELEASE ORAL at 07:46

## 2021-06-27 RX ADMIN — ALLOPURINOL 300 MG: 300 TABLET ORAL at 07:47

## 2021-06-27 RX ADMIN — Medication 10 ML: at 20:24

## 2021-06-27 RX ADMIN — Medication 10 ML: at 07:48

## 2021-06-27 RX ADMIN — BUTALBITAL, ACETAMINOPHEN, AND CAFFEINE 2 TABLET: 50; 325; 40 TABLET ORAL at 11:55

## 2021-06-27 RX ADMIN — LEVETIRACETAM 500 MG: 500 TABLET ORAL at 07:46

## 2021-06-27 RX ADMIN — Medication 2000 MG: at 00:59

## 2021-06-27 RX ADMIN — COLCHICINE 0.6 MG: 0.6 TABLET ORAL at 07:46

## 2021-06-27 RX ADMIN — Medication 2000 MG: at 07:47

## 2021-06-27 RX ADMIN — PROBENECID 500 MG: 500 TABLET, FILM COATED ORAL at 07:46

## 2021-06-27 RX ADMIN — LEVETIRACETAM 500 MG: 500 TABLET ORAL at 20:23

## 2021-06-27 RX ADMIN — POLYETHYLENE GLYCOL 3350 17 G: 17 POWDER, FOR SOLUTION ORAL at 07:47

## 2021-06-27 ASSESSMENT — PAIN SCALES - GENERAL
PAINLEVEL_OUTOF10: 5
PAINLEVEL_OUTOF10: 0
PAINLEVEL_OUTOF10: 5
PAINLEVEL_OUTOF10: 0
PAINLEVEL_OUTOF10: 0
PAINLEVEL_OUTOF10: 5
PAINLEVEL_OUTOF10: 0
PAINLEVEL_OUTOF10: 0
PAINLEVEL_OUTOF10: 3
PAINLEVEL_OUTOF10: 5
PAINLEVEL_OUTOF10: 0
PAINLEVEL_OUTOF10: 0

## 2021-06-27 ASSESSMENT — PAIN DESCRIPTION - PAIN TYPE
TYPE: ACUTE PAIN
TYPE: ACUTE PAIN

## 2021-06-27 ASSESSMENT — PAIN DESCRIPTION - LOCATION
LOCATION: HEAD

## 2021-06-27 ASSESSMENT — PAIN DESCRIPTION - PROGRESSION
CLINICAL_PROGRESSION: RESOLVED
CLINICAL_PROGRESSION: GRADUALLY IMPROVING
CLINICAL_PROGRESSION: RESOLVED
CLINICAL_PROGRESSION: GRADUALLY IMPROVING

## 2021-06-27 ASSESSMENT — PAIN - FUNCTIONAL ASSESSMENT: PAIN_FUNCTIONAL_ASSESSMENT: ACTIVITIES ARE NOT PREVENTED

## 2021-06-27 ASSESSMENT — PAIN DESCRIPTION - ORIENTATION
ORIENTATION: RIGHT

## 2021-06-27 ASSESSMENT — PAIN DESCRIPTION - DESCRIPTORS
DESCRIPTORS: ACHING
DESCRIPTORS: SORE

## 2021-06-27 ASSESSMENT — PAIN DESCRIPTION - FREQUENCY: FREQUENCY: INTERMITTENT

## 2021-06-27 ASSESSMENT — PAIN DESCRIPTION - ONSET: ONSET: ON-GOING

## 2021-06-27 NOTE — PROGRESS NOTES
Neuro Science Intensive Care Unit  Critical Care  Daily Progress Note 6/27/2021    Date of Admission: 6/25/21    CC: Critical care management. HOSPITAL EVENTS  6/24 presented with chief complaint of multiple falls. Diagnostic work-up revealed acute on chronic subdural hematoma. 6-25 right frontal bur hole craniotomy drain hematoma and placement of drain. 6/26 no significant overnight issues. Complains of headache     OVERNIGHT EVENTS: T max 98.4 F. No additional doses of antihypertensive agents in the previous 24 hours. PHYSICAL EXAM:    /75   Pulse 59   Temp 98.4 °F (36.9 °C) (Oral)   Resp 22   Ht 6' (1.829 m)   Wt 252 lb 9.6 oz (114.6 kg)   SpO2 94%   BMI 34.26 kg/m²     Intake/Output Summary (Last 24 hours) at 6/27/2021 0809  Last data filed at 6/27/2021 0600  Gross per 24 hour   Intake 440 ml   Output 1750 ml   Net -1310 ml         General appearance:  Comfortable. NEUROLOGIC:        GCS:    4 - Opens eyes on own   6 - Follows simple motor commands  5 - Alert and oriented       Pupil size:  Left 3 mm  Right 3 mm  Pupil reaction: Yes   PERRLA  Wiggles fingers: Left Yes Right Yes  Hand grasp:   Left present     Right present  Wiggles toes: Left Yes    Right Yes  Plantar flexion: Left present    Right present  Facial droop:   None   speech:  Clear      SDH drainage: Serosanguineous Previous 8 hour: 0 ml  Previous 24 hour: 50 ml      CONSTITUTIONAL: No acute distress  CARDIOVASCULAR: S1 S2, regular rate, regular rhythm,Monitor:   PULMONARY:  Respirations unlabored. No rhonchi/rales/wheezes. ABDOMEN: Soft, nontender, nondistended, nontympanic, normal bowel sounds. MUSCULOSKELETAL: No focal deficit  SKIN/EXTREMITIES: No rashes/ecchymosis, no edema/clubbing, warm/dry, good capillary refill. IV ACCESS: IV      ASSESSMENT/PLAN:     Active Problems:    Fall at home, sequela    SDH (subdural hematoma) (HCC)  Resolved Problems:    * No resolved hospital problems.  *          Neuro: Status post right frontal ellie hole craniotomy evacuation of SDH with drain placement. Neurosurgery following. Monitor neuro status. Keppra  CV: No acute issues. Hx HLD.  HTN. Non-STEMI.  CAD. Cardiomyopathy. BP goal <150 mm Hg. Lipitor. Metoprolol. Pulm: No acute issues per monitor respiratory status  GI: No acute issues. Tolerating diet. Monitor bowel status  Renal: Serum creatinine at 1.3 mg/dL consistent with prior levels. Monitor uop, renal function and electrolytes  ID: No leukocytosis. Afebrile. Endocrine: Hyperglycemia. Hx thyroid disease. Tapazole. Follow labs. MSK:. No focal deficits. Hx gout PT OT. Colchicine. Zyloprim  Heme: No acute issues. Monitor CBC        Bowel regime: GlycoLax. Pain control/Sedation:  Acetaminophen Fioricet. DVT prophylaxis: SCDs. GI prophylaxis: Diet  Glucose protocol:. Follow lab  Mouth/Eye care: As needed  Consults: Trauma. Neurosurgery. Patient/Family update: Patient and wife updated on status questions answered  Code status: Full      Disposition:  NSICU. Severiano Party, DNP.  APRN-CNP  6/27/2021  8:09 AM

## 2021-06-27 NOTE — PLAN OF CARE
Problem: Falls - Risk of:  Goal: Will remain free from falls  Description: Will remain free from falls  6/26/2021 2056 by Kayy Cobian RN  Outcome: Met This Shift     Problem: Falls - Risk of:  Goal: Absence of physical injury  Description: Absence of physical injury  6/26/2021 2056 by Kayy Cobian RN  Outcome: Met This Shift     Problem: HEMODYNAMIC STATUS  Goal: Patient has stable vital signs and fluid balance  6/26/2021 2056 by Kayy Cobian RN  Outcome: Met This Shift     Problem: ACTIVITY INTOLERANCE/IMPAIRED MOBILITY  Goal: Mobility/activity is maintained at optimum level for patient  6/26/2021 2056 by Kayy Cobian RN  Outcome: Met This Shift     Problem: COMMUNICATION IMPAIRMENT  Goal: Ability to express needs and understand communication  6/26/2021 2056 by Kayy Cobian RN  Outcome: Met This Shift     Problem: Skin Integrity:  Goal: Will show no infection signs and symptoms  Description: Will show no infection signs and symptoms  6/26/2021 2056 by Kayy Cobian RN  Outcome: Met This Shift     Problem: Skin Integrity:  Goal: Absence of new skin breakdown  Description: Absence of new skin breakdown  6/26/2021 2056 by Kayy Cobian RN  Outcome: Met This Shift

## 2021-06-27 NOTE — PROGRESS NOTES
Neurosurg progress note  VITALS:  /76   Pulse 59   Temp 98.3 °F (36.8 °C) (Oral)   Resp 15   Ht 6' (1.829 m)   Wt 252 lb 9.6 oz (114.6 kg)   SpO2 96%   BMI 34.26 kg/m²   24HR INTAKE/OUTPUT:    Intake/Output Summary (Last 24 hours) at 6/27/2021 1220  Last data filed at 6/27/2021 1100  Gross per 24 hour   Intake 560 ml   Output 1500 ml   Net -940 ml     XR CHEST (2 VW)    Result Date: 6/24/2021  EXAMINATION: TWO XRAY VIEWS OF THE CHEST 6/24/2021 3:07 pm COMPARISON: The/3/16. HISTORY: ORDERING SYSTEM PROVIDED HISTORY: chest pain TECHNOLOGIST PROVIDED HISTORY: Reason for exam:->chest pain FINDINGS: The lungs are without acute focal process. There is no effusion or pneumothorax. The cardiomediastinal silhouette is without acute process. The osseous structures are without acute process. No acute process. CT HEAD WO CONTRAST    Result Date: 6/24/2021  EXAMINATION: CT OF THE HEAD WITHOUT CONTRAST  6/24/2021 7:40 pm TECHNIQUE: CT of the head was performed without the administration of intravenous contrast. Dose modulation, iterative reconstruction, and/or weight based adjustment of the mA/kV was utilized to reduce the radiation dose to as low as reasonably achievable. COMPARISON: 1523 hours HISTORY: ORDERING SYSTEM PROVIDED HISTORY: follow-up CT from 1524 TECHNOLOGIST PROVIDED HISTORY: Has a \"code stroke\" or \"stroke alert\" been called? ->No Reason for exam:->follow-up CT from 1524 Decision Support Exception - unselect if not a suspected or confirmed emergency medical condition->Emergency Medical Condition (MA) What reading provider will be dictating this exam?->CRC FINDINGS: Heterogeneous right frontal extra-axial collection consistent with acute on chronic subdural hematoma unchanged since the prior examination. Mass effect on the right frontal lobe, unchanged. No new areas of intracranial hemorrhage or hematoma. Age-related loss of brain volume and chronic periventricular ischemic changes again noted. cm x 7 cm x 2.8 cm extra-axial mixed attenuated fluid collection seen within the right frontal convexity consistent with a acute on chronic subdural hematoma. 2. There is effacement of the right frontal lobe 3. There is no midline shift. 4. Left temporal fossa arachnoid cyst 5. This report was called to the emergency room following dictation. CBC:   Lab Results   Component Value Date    WBC 5.5 06/27/2021    RBC 4.77 06/27/2021    HGB 15.3 06/27/2021    HCT 46.4 06/27/2021    MCV 97.3 06/27/2021    MCH 32.1 06/27/2021    MCHC 33.0 06/27/2021    RDW 13.7 06/27/2021    PLT 95 06/27/2021    MPV 10.5 06/27/2021     BMP:    Lab Results   Component Value Date     06/27/2021    K 4.1 06/27/2021     06/27/2021    CO2 21 06/27/2021    BUN 24 06/27/2021    LABALBU 3.4 06/24/2021    LABALBU 4.1 02/10/2012    CREATININE 1.3 06/27/2021    CALCIUM 8.9 06/27/2021    GFRAA >60 06/27/2021    LABGLOM 54 06/27/2021    GLUCOSE 107 06/27/2021    GLUCOSE 92 02/10/2012      levETIRAcetam  500 mg Oral BID    sodium chloride flush  10 mL Intravenous 2 times per day    polyethylene glycol  17 g Oral Daily    atorvastatin  20 mg Oral Nightly    metoprolol succinate  100 mg Oral Daily    probenecid  500 mg Oral Daily    And    colchicine  0.6 mg Oral Daily    ceFAZolin  2,000 mg Intravenous Q8H    allopurinol  300 mg Oral Daily    methIMAzole  7.5 mg Oral Daily     Awake and alert, perrl eomi motor full  Assessment:  Patient Active Problem List   Diagnosis    CKD (chronic kidney disease) stage 3, GFR 30-59 ml/min    [facility]I (non-ST elevated [facility] infarction) [facility]    Hyperthyroidism    History of pulmonary embolism    History of gout    Thyroid cyst    Bilateral renal cysts    Coronary artery disease involving native coronary artery of native heart    Fall at home, sequela    SDH (subdural hematoma) ([facility]     Plan: Will dc drain tomorrow Continue current care  Mercedes Barclay MD M.D.

## 2021-06-27 NOTE — PLAN OF CARE
Problem: Falls - Risk of:  Goal: Will remain free from falls  Description: Will remain free from falls  Outcome: Met This Shift  Goal: Absence of physical injury  Description: Absence of physical injury  Outcome: Met This Shift     Problem: COMMUNICATION IMPAIRMENT  Goal: Ability to express needs and understand communication  Outcome: Met This Shift

## 2021-06-28 ENCOUNTER — APPOINTMENT (OUTPATIENT)
Dept: CT IMAGING | Age: 76
DRG: 026 | End: 2021-06-28
Payer: MEDICARE

## 2021-06-28 LAB
ANION GAP SERPL CALCULATED.3IONS-SCNC: 11 MMOL/L (ref 7–16)
BUN BLDV-MCNC: 24 MG/DL (ref 6–23)
CALCIUM SERPL-MCNC: 9.3 MG/DL (ref 8.6–10.2)
CHLORIDE BLD-SCNC: 106 MMOL/L (ref 98–107)
CO2: 21 MMOL/L (ref 22–29)
CREAT SERPL-MCNC: 1.4 MG/DL (ref 0.7–1.2)
GFR AFRICAN AMERICAN: 60
GFR NON-AFRICAN AMERICAN: 49 ML/MIN/1.73
GLUCOSE BLD-MCNC: 93 MG/DL (ref 74–99)
HCT VFR BLD CALC: 46.3 % (ref 37–54)
HEMOGLOBIN: 15.4 G/DL (ref 12.5–16.5)
MAGNESIUM: 1.9 MG/DL (ref 1.6–2.6)
MCH RBC QN AUTO: 32.2 PG (ref 26–35)
MCHC RBC AUTO-ENTMCNC: 33.3 % (ref 32–34.5)
MCV RBC AUTO: 96.7 FL (ref 80–99.9)
PDW BLD-RTO: 13.7 FL (ref 11.5–15)
PHOSPHORUS: 3.2 MG/DL (ref 2.5–4.5)
PLATELET # BLD: 92 E9/L (ref 130–450)
PLATELET CONFIRMATION: NORMAL
PMV BLD AUTO: 11.7 FL (ref 7–12)
POTASSIUM REFLEX MAGNESIUM: 5 MMOL/L (ref 3.5–5)
RBC # BLD: 4.79 E12/L (ref 3.8–5.8)
SODIUM BLD-SCNC: 138 MMOL/L (ref 132–146)
T4 FREE: 1.05 NG/DL (ref 0.93–1.7)
TSH SERPL DL<=0.05 MIU/L-ACNC: 1.33 UIU/ML (ref 0.27–4.2)
WBC # BLD: 5.4 E9/L (ref 4.5–11.5)

## 2021-06-28 PROCEDURE — 83735 ASSAY OF MAGNESIUM: CPT

## 2021-06-28 PROCEDURE — 6370000000 HC RX 637 (ALT 250 FOR IP): Performed by: NURSE PRACTITIONER

## 2021-06-28 PROCEDURE — 99291 CRITICAL CARE FIRST HOUR: CPT | Performed by: SURGERY

## 2021-06-28 PROCEDURE — 6370000000 HC RX 637 (ALT 250 FOR IP): Performed by: NEUROLOGICAL SURGERY

## 2021-06-28 PROCEDURE — 97165 OT EVAL LOW COMPLEX 30 MIN: CPT

## 2021-06-28 PROCEDURE — 72125 CT NECK SPINE W/O DYE: CPT

## 2021-06-28 PROCEDURE — 2000000000 HC ICU R&B

## 2021-06-28 PROCEDURE — 2580000003 HC RX 258: Performed by: NEUROLOGICAL SURGERY

## 2021-06-28 PROCEDURE — 85027 COMPLETE CBC AUTOMATED: CPT

## 2021-06-28 PROCEDURE — 2500000003 HC RX 250 WO HCPCS: Performed by: SURGERY

## 2021-06-28 PROCEDURE — 6370000000 HC RX 637 (ALT 250 FOR IP)

## 2021-06-28 PROCEDURE — 70450 CT HEAD/BRAIN W/O DYE: CPT

## 2021-06-28 PROCEDURE — 84100 ASSAY OF PHOSPHORUS: CPT

## 2021-06-28 PROCEDURE — 84439 ASSAY OF FREE THYROXINE: CPT

## 2021-06-28 PROCEDURE — 97530 THERAPEUTIC ACTIVITIES: CPT

## 2021-06-28 PROCEDURE — 97535 SELF CARE MNGMENT TRAINING: CPT

## 2021-06-28 PROCEDURE — 80048 BASIC METABOLIC PNL TOTAL CA: CPT

## 2021-06-28 PROCEDURE — 84443 ASSAY THYROID STIM HORMONE: CPT

## 2021-06-28 PROCEDURE — 36415 COLL VENOUS BLD VENIPUNCTURE: CPT

## 2021-06-28 PROCEDURE — 97161 PT EVAL LOW COMPLEX 20 MIN: CPT

## 2021-06-28 PROCEDURE — 6360000002 HC RX W HCPCS: Performed by: NEUROLOGICAL SURGERY

## 2021-06-28 PROCEDURE — 2500000003 HC RX 250 WO HCPCS: Performed by: NEUROLOGICAL SURGERY

## 2021-06-28 RX ORDER — DIAPER,BRIEF,INFANT-TODD,DISP
EACH MISCELLANEOUS
Status: COMPLETED
Start: 2021-06-28 | End: 2021-06-28

## 2021-06-28 RX ORDER — DEXAMETHASONE SODIUM PHOSPHATE 4 MG/ML
4 INJECTION, SOLUTION INTRA-ARTICULAR; INTRALESIONAL; INTRAMUSCULAR; INTRAVENOUS; SOFT TISSUE EVERY 6 HOURS
Status: DISCONTINUED | OUTPATIENT
Start: 2021-06-28 | End: 2021-06-30

## 2021-06-28 RX ORDER — DEXAMETHASONE SODIUM PHOSPHATE 10 MG/ML
10 INJECTION INTRAMUSCULAR; INTRAVENOUS ONCE
Status: COMPLETED | OUTPATIENT
Start: 2021-06-28 | End: 2021-06-28

## 2021-06-28 RX ORDER — METOPROLOL TARTRATE 5 MG/5ML
5 INJECTION INTRAVENOUS EVERY 6 HOURS
Status: DISCONTINUED | OUTPATIENT
Start: 2021-06-28 | End: 2021-06-30 | Stop reason: HOSPADM

## 2021-06-28 RX ADMIN — Medication 2000 MG: at 01:45

## 2021-06-28 RX ADMIN — POLYETHYLENE GLYCOL 3350 17 G: 17 POWDER, FOR SOLUTION ORAL at 09:20

## 2021-06-28 RX ADMIN — PROBENECID 500 MG: 500 TABLET, FILM COATED ORAL at 09:32

## 2021-06-28 RX ADMIN — Medication 10 ML: at 22:05

## 2021-06-28 RX ADMIN — BACITRACIN ZINC: 500 OINTMENT TOPICAL at 09:00

## 2021-06-28 RX ADMIN — METOPROLOL TARTRATE 5 MG: 1 INJECTION, SOLUTION INTRAVENOUS at 22:05

## 2021-06-28 RX ADMIN — SODIUM CHLORIDE, PRESERVATIVE FREE 10 ML: 5 INJECTION INTRAVENOUS at 15:22

## 2021-06-28 RX ADMIN — DEXAMETHASONE SODIUM PHOSPHATE 4 MG: 4 INJECTION, SOLUTION INTRAMUSCULAR; INTRAVENOUS at 22:05

## 2021-06-28 RX ADMIN — Medication 2000 MG: at 19:08

## 2021-06-28 RX ADMIN — ALLOPURINOL 300 MG: 300 TABLET ORAL at 09:32

## 2021-06-28 RX ADMIN — LEVETIRACETAM 500 MG: 500 TABLET ORAL at 09:04

## 2021-06-28 RX ADMIN — LEVETIRACETAM 500 MG: 500 TABLET ORAL at 22:05

## 2021-06-28 RX ADMIN — SODIUM CHLORIDE, PRESERVATIVE FREE 10 ML: 5 INJECTION INTRAVENOUS at 19:09

## 2021-06-28 RX ADMIN — BUTALBITAL, ACETAMINOPHEN, AND CAFFEINE 2 TABLET: 50; 325; 40 TABLET ORAL at 07:52

## 2021-06-28 RX ADMIN — METHIMAZOLE 7.5 MG: 5 TABLET ORAL at 09:01

## 2021-06-28 RX ADMIN — Medication 10 ML: at 09:12

## 2021-06-28 RX ADMIN — COLCHICINE 0.6 MG: 0.6 TABLET ORAL at 09:32

## 2021-06-28 RX ADMIN — ATORVASTATIN CALCIUM 20 MG: 20 TABLET, FILM COATED ORAL at 22:05

## 2021-06-28 RX ADMIN — BUTALBITAL, ACETAMINOPHEN, AND CAFFEINE 2 TABLET: 50; 325; 40 TABLET ORAL at 02:39

## 2021-06-28 RX ADMIN — DEXAMETHASONE SODIUM PHOSPHATE 10 MG: 10 INJECTION INTRAMUSCULAR; INTRAVENOUS at 14:40

## 2021-06-28 RX ADMIN — Medication 2000 MG: at 09:06

## 2021-06-28 ASSESSMENT — PAIN DESCRIPTION - PROGRESSION

## 2021-06-28 ASSESSMENT — PAIN SCALES - GENERAL
PAINLEVEL_OUTOF10: 5
PAINLEVEL_OUTOF10: 0
PAINLEVEL_OUTOF10: 5

## 2021-06-28 NOTE — PROGRESS NOTES
Patient has left hand numbness and weakness after removing subdural drain  Will order stat head ct as well as cervical ct  Patient otherwise is neuro intact

## 2021-06-28 NOTE — CARE COORDINATION
SOCIAL WORK/CASEMANAGEMENT TRANSITION OF CARE [id number] Ada Arreola, [address], [place], [phone]): met with pt in the unit. The plan remains home with [facility] when I went over therapy evals. Pt has no preference for [facility] and wants [place] [facility], need orders for PT and OT, referral made. Pt also wants a fww as well [facility], rep following. Need order and progress note , sent perfect serve to pcp. Riddhi/era to follow. Osmel Rockwell, DENNYW  6/28/2021  The Plan for Transition of Care is related to the following treatment goals: hhc and dme. The Patient and/or patient representative was provided with a choice of provider and agrees   with the discharge plan. [x] Yes [] No    Freedom of choice list was provided with basic dialogue that supports the patient's individualized plan of care/goals, treatment preferences and shares the quality data associated with the providers.  [x] Yes [] No

## 2021-06-28 NOTE — PROGRESS NOTES
Occupational Therapy  OCCUPATIONAL THERAPY INITIAL EVALUATION     [address] [address] [address]                                                  Patient Name: Mamadou Yost  MRN: [id number]  : 1945  Room: [address] [place] [address]    Evaluating OT: Dameon Bright OTR/L #[id number]  Referring Provider:Gianmarino Claudean Schmid, DO  Specific Provider Orders: OT eval and treat; 21    Diagnosis:  Fall at home, sequela [W19. XXXS, T78.104]  Reason for Admission: fatigue, weakness and back pain  SDH  Surgery/Procedure: CRANIOTOMY REMINGTON HOLES 21  Pertinent Medical History: CAD, cardiomyopathy, chest pain, HLD, HTN, thyroid disease      Precautions:  Fall Risk    Assessment of current deficits   [x] Functional mobility  [x]ADLs  [x] Strength               []Cognition   [x] Functional transfers   [x] IADLs         [x] Safety Awareness   [x]Endurance   [x] Fine Coordination              [x] Balance      [x] Vision/perception   [x]Sensation    []Gross Motor Coordination  [] ROM  [] Delirium                   [] Motor Control     OT PLAN OF CARE   OT POC based on physician orders, patient diagnosis and results of clinical assessment    Frequency/Duration: 1-3 days/wk for 2 weeks PRN   Specific OT Treatment to include:   * Instruction/training on adapted ADL techniques and AE recommendations to increase functional independence within precautions       * Training on energy conservation strategies, correct breathing pattern and techniques to improve independence/tolerance for self-care routine  * Functional transfer/mobility training/DME recommendations for increased independence, safety, and fall prevention  * Patient/Family education to increase follow through with safety techniques and functional independence  * Recommendation of environmental modifications for increased safety with functional transfers/mobility and ADLs  * Sensory re-education to improve body/limb awareness, maintain/improve skin integrity, and improve hand/UE motor function  * Therapeutic exercise to improve motor endurance, ROM, and functional strength for ADLs/functional transfers  * Therapeutic activities to facilitate/challenge dynamic balance, stand tolerance for increased safety and independence with ADLs  * Therapeutic activities to facilitate gross/fine motor skills for increased independence with ADLs  * Visual-perceptual training to improve environmental scanning, visual attention/focus, and oculomotor skills for increased safety/independence with functional transfers/mobility and ADLs      Recommended Adaptive Equipment:  TBD     Home Living: Pt lives with wife and daughter in a ranch style house w/ 3 JACLYN( B handrails); bed/bath/laundry on 1st floor  Bathroom setup: walk-in shower w/ built-in seat; standard commode   Equipment owned: none    Prior Level of Function: Independent with ADLs , Independent with IADLs; ambulated w/ no AD  Driving: Yes  Occupation: retired  Leisure: likes to swim    Pain Level: Pt reports 0/10 pain this session  Cognition: A&O: 4/4; Follows 2 step directions   Memory:  Fair+   Sequencing:  Fair+   Problem solving:  Fair+   Judgement/safety:  Fair+    RASS: 0  CAM-ICU: (NT) Delirium     Functional Assessment:  AM-PAC Daily Activity Raw Score: 16/24   Initial Eval Status  Date: 6/28/21 Treatment Status  Date: STGs = LTGs  Time frame: 10-14 days   Feeding Independent       Grooming Minimal Assist (standing at sink)  Independent    UB Dressing Minimal Assist   Independent    LB Dressing Minimal Assist (to don/doff socks with AE-seated)  Modified [place]    Bathing Moderate Assist  Modified [place]    Toileting Moderate Assist   Modified [place]    Bed Mobility  Supine to sit: Minimal Assist   Sit to supine: NT  Supine to sit:  Independent   Sit to supine: Independent    Functional Transfers Minimal Assist   Modified [place] Functional Mobility Minimal Assist w/ ww (short distance in hallway and to/from bathroom sink)--cueing for spatial awareness  Modified [facility]    Balance Sitting:     Dynamic: SBA  Standing: Min. A w/ ww  Sitting:     Dynamic:Indep  Standing: Mod. I   Activity Tolerance Fair (limited d/t dizziness, monitored throughout session)  good   Visual/  Perceptual Glasses: none    Impaired spatial awareness during mobility noted; continue to assess in session                Hand Dominance R   AROM (PROM) Strength Additional Info:    RUE  WFL 4/5 good  and fair FMC/dexterity noted during ADL tasks       LUE WFL 4/5 good  and fair FMC/dexterity noted during ADL tasks       [facility]  Sensation:  No c/o numbness or tingling   Tone: WFL   Edema: none noted      Vitals:   HR at rest: 64 bpm HR at end of session: 68 bpm   Spo2 at rest:95% Spo2 at end of session 97%   BP at rest:139/76 mmHg BP at end of session 134/87 mmHg       Comments: OK from RN to see patient. Upon arrival, patient lying in bed. Pt demo fair tolerance with fair+ understanding of education/techniques. At end of session, patient seated in bedside chair. Call light within reach, all lines and tubes intact. Pt instructed on use of call light for assistance and fall prevention. Line management and environmental modifications made prior to and end of session to ensure patient safety and to increase efficiency of session. Skilled monitoring of HR, O2 saturation, blood pressure and patient's response to activity performed throughout session. Pt reported dizziness in sitting, educated pt on rest breaks after positional changes and monitored symptoms until pt reported they subsided. Overall, pt presents with decreased activity tolerance, dynamic balance, functional mobility limiting completion of ADLs and safe return home. Pt can benefit from continued skilled OT to increase safety and functional independence.      Treatment:   · Bed mobility: Facilitated bed mobility(supine>sit) with cues for proper body mechanics and sequencing to prepare for ADL completion. · Functional transfers: Facilitated transfers from EOB, bedside chair(sit<>stand) with cues for body alignment, safety and hand placement. · ADL completion: Self-care retraining for the above-mentioned ADLs; training on proper hand placement, safety technique, sequencing, and energy conservation techniques. Educated pt on LB AE to don/doff socks seated in bedside chair, pt demo fair understanding and return demo of technique. Pt demo impaired fmc/sensation. Completed washing face standing at sink for increased activity tolerance. · Postural Balance: Sitting and standing balance retraining to improve righting reactions with postural changes during ADLS. Use of ww for support in standing. · Cognitive/Perceptual training: retraining exercises to improve attention, mentation, and spatial awareness for ADLs & transfers. · Delirium Prevention/Management: Implementation of non-pharmacologic interventions for delirium prevention incorporated throughout session to patient. Including environmental and sensory modifications to decrease patient's internal distraction caused by delirium, and improve overall mentation. Assistance of 2 required for safety d/t pt's medical complexity, line management and pt's deconditioned. Rehab Potential: Good for established goals     Patient / Family Goal: to go home      Patient and/or family were instructed on functional diagnosis, prognosis/goals and OT plan of care. Demonstrated good understanding. Eval Complexity: Low  · History: Expanded chart review of consults, imaging, and psychosocial history related to current functional performance. · Exam: 5+ performance deficits identified limiting functional independence and safe return home   · Assistance/Modification: Min/mod assistance or modifications required to perform tasks.  May have comorbidities that

## 2021-06-28 NOTE — PROGRESS NOTES
Neurosurg progress note  VITALS:  /67   Pulse 59   Temp 98.3 °F (36.8 °C) (Temporal)   Resp 23   Ht 6' (1.829 m)   Wt 252 lb 9.6 oz (114.6 kg)   SpO2 95%   BMI 34.26 kg/m²   24HR INTAKE/OUTPUT:    Intake/Output Summary (Last 24 hours) at 6/28/2021 0951  Last data filed at 6/28/2021 0600  Gross per 24 hour   Intake 560 ml   Output 1225 ml   Net -665 ml     XR CHEST (2 VW)    Result Date: 6/24/2021  EXAMINATION: TWO XRAY VIEWS OF THE CHEST 6/24/2021 3:07 pm COMPARISON: The/3/16. HISTORY: ORDERING SYSTEM PROVIDED HISTORY: chest pain TECHNOLOGIST PROVIDED HISTORY: Reason for exam:->chest pain FINDINGS: The lungs are without acute focal process. There is no effusion or pneumothorax. The cardiomediastinal silhouette is without acute process. The osseous structures are without acute process. No acute process. CT HEAD WO CONTRAST    Result Date: 6/24/2021  EXAMINATION: CT OF THE HEAD WITHOUT CONTRAST  6/24/2021 7:40 pm TECHNIQUE: CT of the head was performed without the administration of intravenous contrast. Dose modulation, iterative reconstruction, and/or weight based adjustment of the mA/kV was utilized to reduce the radiation dose to as low as reasonably achievable. COMPARISON: 1523 hours HISTORY: ORDERING SYSTEM PROVIDED HISTORY: follow-up CT from 1524 TECHNOLOGIST PROVIDED HISTORY: Has a \"code stroke\" or \"stroke alert\" been called? ->No Reason for exam:->follow-up CT from 1524 Decision Support Exception - unselect if not a suspected or confirmed emergency medical condition->Emergency Medical Condition (MA) What reading provider will be dictating this exam?->CRC FINDINGS: Heterogeneous right frontal extra-axial collection consistent with acute on chronic subdural hematoma unchanged since the prior examination. Mass effect on the right frontal lobe, unchanged. No new areas of intracranial hemorrhage or hematoma.  Age-related loss of brain volume and chronic periventricular ischemic changes again noted. Left temporal fossa arachnoid cyst again noted. 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 Right frontal acute on chronic subdural hematoma, unchanged since the prior examination. CT HEAD WO CONTRAST    Result Date: 6/24/2021  EXAMINATION: CT OF THE HEAD WITHOUT CONTRAST  6/24/2021 3:18 pm TECHNIQUE: CT of the head was performed without the administration of intravenous contrast. Dose modulation, iterative reconstruction, and/or weight based adjustment of the mA/kV was utilized to reduce the radiation dose to as low as reasonably achievable. COMPARISON: None. HISTORY: ORDERING SYSTEM PROVIDED HISTORY: dizziness TECHNOLOGIST PROVIDED HISTORY: Has a \"code stroke\" or \"stroke alert\" been called? ->No Reason for exam:->dizziness Decision Support Exception - unselect if not a suspected or confirmed emergency medical condition->Emergency Medical Condition (MA) FINDINGS: BRAIN/VENTRICLES: There is a extra-axial mixed attenuated fluid collection seen along the right frontal convexity measuring 9 cm and transverse diameter by 2.8 cm in AP diameter by 7 cm in craniocaudal dimension. This finding represents an acute on chronic subdural hematoma. There is hyperdense material within the extra-axial subdural hematoma consistent with a mixture of acute blood. There is effacement of the right frontal lobe. There is no intraparenchymal hemorrhage. There is no evidence of hydrocephalus. There is no midline shift. The ventricles, cisterns and sulci are prominent consistent with atrophy. There is an extra-axial CSF density focus along the anterior aspect of the left temporal lobe consistent with an arachnoid cyst. ORBITS: The visualized portion of the orbits demonstrate no acute abnormality. SINUSES: The visualized paranasal sinuses and mastoid air cells demonstrate no acute abnormality. SOFT TISSUES/SKULL:  No acute abnormality of the visualized skull or soft tissues. 1. 9 cm x 7 cm x 2.8 cm extra-axial mixed attenuated fluid collection seen within the right frontal convexity consistent with a acute on chronic subdural hematoma. 2. There is effacement of the right frontal lobe 3. There is no midline shift. 4. Left temporal fossa arachnoid cyst 5. This report was called to the emergency room following dictation.      CBC:   Lab Results   Component Value Date    WBC 5.4 06/28/2021    RBC 4.79 06/28/2021    HGB 15.4 06/28/2021    HCT 46.3 06/28/2021    MCV 96.7 06/28/2021    MCH 32.2 06/28/2021    MCHC 33.3 06/28/2021    RDW 13.7 06/28/2021    PLT 92 06/28/2021    MPV 11.7 06/28/2021     BMP:    Lab Results   Component Value Date     06/28/2021    K 5.0 06/28/2021     06/28/2021    CO2 21 06/28/2021    BUN 24 06/28/2021    LABALBU 3.4 06/24/2021    LABALBU 4.1 02/10/2012    CREATININE 1.4 06/28/2021    CALCIUM 9.3 06/28/2021    GFRAA 60 06/28/2021    LABGLOM 49 06/28/2021    GLUCOSE 93 06/28/2021    GLUCOSE 92 02/10/2012      levETIRAcetam  500 mg Oral BID    sodium chloride flush  10 mL Intravenous 2 times per day    polyethylene glycol  17 g Oral Daily    atorvastatin  20 mg Oral Nightly    metoprolol succinate  100 mg Oral Daily    probenecid  500 mg Oral Daily    And    colchicine  0.6 mg Oral Daily    ceFAZolin  2,000 mg Intravenous Q8H    allopurinol  300 mg Oral Daily    methIMAzole  7.5 mg Oral Daily     Awake and alert, follows commands perrl eomi  Assessment:  Patient Active Problem List   Diagnosis    CKD (chronic kidney disease) stage 3, GFR 30-59 ml/min    NSTEMI (non-ST elevated myocardial infarction) ([facility] [address]    Hyperthyroidism    History of pulmonary embolism    History of gout    Thyroid cyst    Bilateral renal cysts    Coronary artery disease involving native coronary artery of native heart    Fall at home, sequela    SDH (subdural hematoma) (HCC)     Plan:Continue current care  Leti Hurt MD M.D.

## 2021-06-28 NOTE — PROGRESS NOTES
Intensive Care Unit  Critical Care Consult  Daily Progress Note 6/28/2021    Date of Admission: 6/25/21    EVENTS:   6/24 presented with chief complaint of multiple falls. Diagnostic work-up revealed acute on chronic subdural hematoma. 6-25 right frontal bur hole craniotomy drain hematoma and placement of drain. 6/26 no significant overnight issues. Complains of headache   6/28 no events overnight. Drain pulled today by NSG. Patient having complaints of numbness in left hand and forearm after drain was pulled, lasted approximately 30 minutes before resolving on its own. PHYSICAL EXAM:    /67   Pulse 59   Temp 98.3 °F (36.8 °C) (Temporal)   Resp 23   Ht 6' (1.829 m)   Wt 252 lb 9.6 oz (114.6 kg)   SpO2 95%   BMI 34.26 kg/m²     General appearance:  Comfortable. Pain Description: none    GCS:    4 - Opens eyes on own   6 - Follows simple motor commands  5 - Alert and oriented    Pupil size: Left 3 mm  Right 3 mm  Pupil reaction: Yes  Wiggles fingers: Left Yes Right Yes  Hand grasp:   Left normal     Right normal  Wiggles toes: Left Yes    Right Yes  Plantar flexion:  Left normal    Right normal    CONSTITUTIONAL: no acute distress, lying in hospital bed  NEUROLOGIC: PERRL, oriented x 4  CARDIOVASCULAR: S1 S2, regular rate, regular rhythm, no murmur/gallop/rub.  Monitor: NSR  PULMONARY: no rhonchi/rales/wheezes, no use of accessory muscles  RENAL: clear yellow urine  ABDOMEN: soft, nontender, nondistended, nontympanic, normal bowel sounds   MUSCULOSKELETAL: moves all extremities purposefully, 5/5 strength   SKIN/EXTREMITIES: no rashes/ecchymosis, no edema/clubbing, warm/dry, good capillary refill     LINES: PIV    CONSULTS:   Neurosurgery    Past Medical History:   Diagnosis Date    Bilateral renal cysts 10/13/2014    CAD (coronary artery disease)     Cardiomyopathy ([facility] [address]     Chest pain     8-4-2016 lexiscan stress    History of gout 10/13/2014    History of pulmonary embolism 10/13/2014  Hyperlipidemia     Hypertension     Hyperthyroidism 10/13/2014    NSTEMI (non-ST elevated myocardial infarction) ([facility] [address] 10/13/2014    Obesity due to excess calories     Thyroid cyst 10/13/2014    Thyroid disease        ASSESSMENT/PLAN:       Neuro: Status post right frontal ellie hole craniotomy evacuation of SDH with drain placement  Monitor neuro status  Neurosurgery following  Keppra    Drain removed    CV: No acute issues. Hx HLD, HTN, NSTEMI, CAD, cardiomyopathy  Monitor hemodynamics. BP goal less than 150 mmHg   lipitor  metoprolol     Pulm: No acute issues   Monitor RR & SpO2. Encourage cough and deep breathing   Supplemental oxygen PRN     GI: No acute issues. Diet. Monitor bowel function. Renal: No acute issues   Monitor BUN & Cr.   Monitor electrolytes & replace as needed. Monitor urine output. ID: No acute issues     Endocrine: No acute issues. Hx thyroid disease  Monitor BS.  tapazole    MSK: No acute issues. Hx gout  ROM. Turn & reposition. PT/OT  Colchicine, zyloprim    Heme: thrombocytopenia  Monitor CBC. Bowel regimen: Glycolax . Last BM 6/27  Pain control/Sedation: Tylenol  DVT prophylaxis: SCDs.   Lovenox tomorrow  GI prophylaxis:  diet  Family update: will update when available  Code status:  full  Disposition:  ICU, transfer later     Electronically signed by Cheryle Wolf CNP on 6/28/2021 at 10:25 AM

## 2021-06-28 NOTE — PROGRESS NOTES
Physical Therapy  Physical Therapy Initial Assessment     Name: Lou Arzate  : 1945  MRN: [id number]      Date of Service: 2021    Evaluating PT:  Yesica Daughters, PT, DPT [id number]     Room #:  5287/5296-E  Diagnosis:  Fall at home, sequela [W19. XXXS, U47.064]  Reason for admission: multiple falls, SDH   Precautions:  Falls  Procedure/Surgery:   R frontal ellie hole with drain insertion  Equipment Recommendations:  FWW    SUBJECTIVE:  Pt lives with wife and daughter in a 1 story home with 3 stair(s) to enter and 2 rail(s). Bed is on 1st floor and bath is on 1st floor. Pt ambulated with no AD PTA. OBJECTIVE:   Initial Evaluation  Date:  Treatment   Short Term/ Long Term   Goals   AM-PAC 6 Clicks /     Was pt agreeable to Eval/treatment? Yes      Does pt have pain?  Denies pain     Bed Mobility  Rolling: NT  Supine to sit: Marcus  Sit to supine: NT  Scooting: Marcus  Independent    Transfers Sit to stand: Marcus  Stand to sit: Marcus  Stand pivot: [employer]  Independent    Ambulation    80 feet with [employer] Marcus    >300 feet with AAD Mod I vs independent    Stair negotiation: ascended and descended  NT  3 steps with 2 rail Mod I    ROM BUE:  See OT eval   BLE:  WFL     Strength BUE:  See OT eval   BLE:  knee ext 5/5  Ankle DF 5/5  Increase by 1/3 MMT grade    Balance Sitting EOB:  SBA  Dynamic Standing:  Marcus  Sitting EOB:  independent  Dynamic Standing:  indep     -Pt is A & O x 3  -Sensation:  Pt denies numbness and tingling to extremities  -Edema:  unremarkable   -RASS: 0  -CAM-ICU: NT    Vitals:  Heart Rate at rest 65 Heart Rate post session 71   SPO2 at rest 95% SPO2 post session 97%   Blood pressure at rest 139/76 Blood Pressure post session 134/82     Functional Status Score-Intensive Care Unit (FSS-ICU)   Rolling 4/7   Supine to sit transfer 4/7   Unsupported sitting  5/7   Sit to stand transfers 4/7   Ambulation 2/7   Total       Therapeutic Exercises:  Functional activity     Patient education  Pt educated on safety, sequencing of transfers, and role of PT    Patient response to education:   Pt verbalized understanding Pt demonstrated skill Pt requires further education in this area   Yes  Partial  Yes      ASSESSMENT:  Conditions Requiring Skilled Therapeutic Intervention:  [x]Decreased strength     []Decreased ROM  [x]Decreased functional mobility  [x]Decreased balance   [x]Decreased endurance   [x]Decreased posture  [x]Decreased sensation  [x]Decreased coordination   []Decreased vision  [x]Decreased safety awareness   []Increased pain       Comments:  Pt received supine in bed and agreeable to PT session with OT collaboration. RN cleared pt for participation prior to session. *OT collaboration required d/t ICU patient complexity, extent of line management, and for safety of patient and staff. Pt able to complete all transfers without hands on assistance. Pt reports continuous dizziness feeling and sensation as if he is \"floating\". Pt ambulating with use of walker but remains unsteady with poor awareness to foot placement, repeatedly hitting L foot on walker and showing poor obstacle avoidance. Pt also taking very small, shuffled steps. Seated in chair to end session   Pt with all needs met and call light in reach. Pt would benefit from continued PT POC to address functional deficits described above. Treatment:  Patient practiced and was instructed in the following treatment:     Patient education provided continuously throughout session for sequencing, safety maintenance, and improving any deficits found during the evaluation.    Bed mobility training - pt given verbal and tactile cues to facilitate proper sequencing and safety during rolling and supine>sit as well as provided with physical assistance to complete task     STS and pivot transfer training - pt educated on proper hand and foot placement, safety and sequencing, and use of WW to safely complete sit<>stand and pivot transfers with hands on assistance to complete task safely     Gait training- pt was given verbal and tactile cues to facilitate improved balance and speed during ambulation as well as provided with physical assistance to complete task. Pt's/ family goals   1. Return home     Patient and or family understand(s) diagnosis, prognosis, and plan of care. Yes     Prognosis is fair for reaching above PT goals. PHYSICAL THERAPY PLAN OF CARE:    PT POC is established based on physician order and patient diagnosis     Referring provider/PT Order:    06/25/21 0130  PT evaluation and treat       Tonia Sevilla DO     Diagnosis:  Fall at home, sequela [W19. XXXS, Y69.613]  Specific instructions for next treatment:  Progress transfers. Progress gait training - increased distance. Stair training. Therex. Balance training    Current Treatment Recommendations:   [x] Strengthening to improve independence with functional mobility   [] ROM to improve independence with functional mobility   [x] Balance Training to improve static/dynamic balance and to reduce fall risk  [x] Endurance Training to improve activity tolerance during functional mobility   [x] Transfer Training to improve safety and independence with all functional transfers   [x] Gait Training to improve gait mechanics, endurance and asses need for appropriate assistive device  [x] Stair Training in preparation for safe discharge home and/or into the community   [] Positioning to prevent skin breakdown and contractures  [x] Safety and Education Training   [] Patient/Caregiver Education   [] HEP  [] Other     PT long term treatment goals are located in above grid    Frequency of treatments: 2-5x/week x 1-2 weeks.     Time in  0925  Time out  1000    Total Treatment Time  25 minutes     Evaluation Time includes thorough review of current medical information, gathering information on past medical history/social history and prior level of function, completion of standardized testing/informal observation of tasks, assessment of data and education on plan of care and goals.     CPT codes:  [x] Low Complexity PT evaluation 81445  [] Moderate Complexity PT evaluation 15207  [] High Complexity PT evaluation 69559  [] PT Re-evaluation 07746  [] Gait training 56024 - minutes  [] Manual therapy 50746 - minutes  [x] Therapeutic activities 23794 25 minutes  [] Therapeutic exercises 24684 - minutes  [] Neuromuscular reeducation 77427 - minutes     Leidy Waggoner, PT, DPT  [id number]

## 2021-06-28 NOTE — PROGRESS NOTES
Comprehensive Nutrition Assessment    Type and Reason for Visit:  Initial    Nutrition Recommendations/Plan: Continue current diet, Start Ensure HP BID, Stanford BID    Nutrition Assessment:  Pt admit s/p fall w/ SDH, s/p ellie hole crani w/ placement subdural drain. Hx CKD. Will add ONS to optimize PO intakes and aid in healing. Will monitor. Malnutrition Assessment:  Malnutrition Status: At risk for malnutrition (Comment)    Context:  Acute Illness     Findings of the 6 clinical characteristics of malnutrition:  Energy Intake:  Mild decrease in energy intake (Comment)  Weight Loss:  Unable to assess     Body Fat Loss:  No significant body fat loss     Muscle Mass Loss:  No significant muscle mass loss    Fluid Accumulation:  No significant fluid accumulation     Strength:  Not Performed    Estimated Daily Nutrient Needs:  Energy (kcal):  ; Weight Used for Energy Requirements:  Current     Protein (g):  120-145 (1.5-1.8); Weight Used for Protein Requirements:  Ideal        Fluid (ml/day):  per critical care; Method Used for Fluid Requirements:         Nutrition Related Findings:  pt alert, active BS, soft abd, trace edema, closed suction drain, fluidsWNL      Wounds:  Surgical Incision (to head)       Current Nutrition Therapies:    ADULT DIET; Regular    Anthropometric Measures:  · Height: 6' (182.9 cm)  · Current Body Weight: 252 lb (114.3 kg) (6/26 bed)   · Admission Body Weight: 254 lb (115.2 kg) (6/25 bed)    · Usual Body Weight:  (UTO, no wt hx on file)     · Ideal Body Weight: 178 lbs; % Ideal Body Weight 141.6 %   · BMI: 34.2  · BMI Categories: Obese Class 1 (BMI 30.0-34. 9)       Nutrition Diagnosis:   · Increased nutrient needs related to increase demand for energy/nutrients as evidenced by wounds    Nutrition Interventions:   Food and/or Nutrient Delivery:  Continue Current Diet, Start Oral Nutrition Supplement (Ensure HP BID, Stanford BID)  Nutrition Education/Counseling:  Education not indicated   Coordination of Nutrition Care:  Continue to monitor while inpatient    Goals:  pt to consume >75% meals/ONS       Nutrition Monitoring and Evaluation:    Food/Nutrient Intake Outcomes:  Food and Nutrient Intake, Supplement Intake  Physical Signs/Symptoms Outcomes:  Biochemical Data, GI Status, Fluid Status or Edema, Hemodynamic Status, Nutrition Focused Physical Findings, Skin, Weight     Discharge Planning:     Too soon to determine     Electronically signed by Alexey Draper, MS, RD, LD on 6/28/21 at 4:26 PM EDT    Contact: [phone]

## 2021-06-28 NOTE — PLAN OF CARE
Problem: Falls - Risk of:  Goal: Will remain free from falls  6/28/2021 0349 by Eloisa Jiang RN  Outcome: Met This Shift  6/27/2021 1636 by Radha Diamond RN  Outcome: Met This Shift  Goal: Absence of physical injury  6/28/2021 0349 by Eloisa Jiang RN  Outcome: Met This Shift  6/27/2021 1636 by Radha Diamond RN  Outcome: Met This Shift     Problem: HEMODYNAMIC STATUS  Goal: Patient has stable vital signs and fluid balance  Outcome: Met This Shift     Problem: COMMUNICATION IMPAIRMENT  Goal: Ability to express needs and understand communication  6/27/2021 1636 by Radha Diamond RN  Outcome: Met This Shift

## 2021-06-29 ENCOUNTER — APPOINTMENT (OUTPATIENT)
Dept: ULTRASOUND IMAGING | Age: 76
DRG: 026 | End: 2021-06-29
Payer: MEDICARE

## 2021-06-29 LAB
ANION GAP SERPL CALCULATED.3IONS-SCNC: 15 MMOL/L (ref 7–16)
BLOOD CULTURE, ROUTINE: NORMAL
BUN BLDV-MCNC: 29 MG/DL (ref 6–23)
CALCIUM SERPL-MCNC: 10 MG/DL (ref 8.6–10.2)
CHLORIDE BLD-SCNC: 103 MMOL/L (ref 98–107)
CO2: 19 MMOL/L (ref 22–29)
CREAT SERPL-MCNC: 1.3 MG/DL (ref 0.7–1.2)
CULTURE, BLOOD 2: NORMAL
GFR AFRICAN AMERICAN: >60
GFR NON-AFRICAN AMERICAN: 54 ML/MIN/1.73
GLUCOSE BLD-MCNC: 176 MG/DL (ref 74–99)
HCT VFR BLD CALC: 49.8 % (ref 37–54)
HEMOGLOBIN: 16.9 G/DL (ref 12.5–16.5)
MCH RBC QN AUTO: 32.1 PG (ref 26–35)
MCHC RBC AUTO-ENTMCNC: 33.9 % (ref 32–34.5)
MCV RBC AUTO: 94.5 FL (ref 80–99.9)
PDW BLD-RTO: 14 FL (ref 11.5–15)
PLATELET # BLD: 132 E9/L (ref 130–450)
PMV BLD AUTO: 11.3 FL (ref 7–12)
POTASSIUM REFLEX MAGNESIUM: 4.3 MMOL/L (ref 3.5–5)
RBC # BLD: 5.27 E12/L (ref 3.8–5.8)
SODIUM BLD-SCNC: 137 MMOL/L (ref 132–146)
WBC # BLD: 9.8 E9/L (ref 4.5–11.5)

## 2021-06-29 PROCEDURE — 97530 THERAPEUTIC ACTIVITIES: CPT

## 2021-06-29 PROCEDURE — 2500000003 HC RX 250 WO HCPCS: Performed by: SURGERY

## 2021-06-29 PROCEDURE — 6370000000 HC RX 637 (ALT 250 FOR IP): Performed by: NEUROLOGICAL SURGERY

## 2021-06-29 PROCEDURE — 2580000003 HC RX 258: Performed by: NEUROLOGICAL SURGERY

## 2021-06-29 PROCEDURE — 85027 COMPLETE CBC AUTOMATED: CPT

## 2021-06-29 PROCEDURE — 6370000000 HC RX 637 (ALT 250 FOR IP): Performed by: SURGERY

## 2021-06-29 PROCEDURE — 84445 ASSAY OF TSI GLOBULIN: CPT

## 2021-06-29 PROCEDURE — 99233 SBSQ HOSP IP/OBS HIGH 50: CPT | Performed by: SURGERY

## 2021-06-29 PROCEDURE — 99222 1ST HOSP IP/OBS MODERATE 55: CPT | Performed by: INTERNAL MEDICINE

## 2021-06-29 PROCEDURE — 76536 US EXAM OF HEAD AND NECK: CPT

## 2021-06-29 PROCEDURE — 36415 COLL VENOUS BLD VENIPUNCTURE: CPT

## 2021-06-29 PROCEDURE — 86376 MICROSOMAL ANTIBODY EACH: CPT

## 2021-06-29 PROCEDURE — 6360000002 HC RX W HCPCS: Performed by: NEUROLOGICAL SURGERY

## 2021-06-29 PROCEDURE — 80048 BASIC METABOLIC PNL TOTAL CA: CPT

## 2021-06-29 PROCEDURE — 2060000000 HC ICU INTERMEDIATE R&B

## 2021-06-29 PROCEDURE — 86800 THYROGLOBULIN ANTIBODY: CPT

## 2021-06-29 PROCEDURE — 97535 SELF CARE MNGMENT TRAINING: CPT

## 2021-06-29 PROCEDURE — 6370000000 HC RX 637 (ALT 250 FOR IP): Performed by: NURSE PRACTITIONER

## 2021-06-29 RX ADMIN — DEXAMETHASONE SODIUM PHOSPHATE 4 MG: 4 INJECTION, SOLUTION INTRAMUSCULAR; INTRAVENOUS at 20:56

## 2021-06-29 RX ADMIN — DEXAMETHASONE SODIUM PHOSPHATE 4 MG: 4 INJECTION, SOLUTION INTRAMUSCULAR; INTRAVENOUS at 03:55

## 2021-06-29 RX ADMIN — Medication 10 ML: at 09:18

## 2021-06-29 RX ADMIN — COLCHICINE 0.6 MG: 0.6 TABLET ORAL at 09:17

## 2021-06-29 RX ADMIN — POLYETHYLENE GLYCOL 3350 17 G: 17 POWDER, FOR SOLUTION ORAL at 09:17

## 2021-06-29 RX ADMIN — METOPROLOL TARTRATE 5 MG: 1 INJECTION, SOLUTION INTRAVENOUS at 02:19

## 2021-06-29 RX ADMIN — LEVETIRACETAM 500 MG: 500 TABLET ORAL at 09:17

## 2021-06-29 RX ADMIN — METOPROLOL TARTRATE 5 MG: 1 INJECTION, SOLUTION INTRAVENOUS at 20:56

## 2021-06-29 RX ADMIN — DEXAMETHASONE SODIUM PHOSPHATE 4 MG: 4 INJECTION, SOLUTION INTRAMUSCULAR; INTRAVENOUS at 09:17

## 2021-06-29 RX ADMIN — ALLOPURINOL 300 MG: 300 TABLET ORAL at 09:17

## 2021-06-29 RX ADMIN — LEVETIRACETAM 500 MG: 500 TABLET ORAL at 20:56

## 2021-06-29 RX ADMIN — Medication 10 ML: at 20:56

## 2021-06-29 RX ADMIN — ATORVASTATIN CALCIUM 20 MG: 20 TABLET, FILM COATED ORAL at 20:56

## 2021-06-29 RX ADMIN — Medication 5 MG: at 22:28

## 2021-06-29 RX ADMIN — PROBENECID 500 MG: 500 TABLET, FILM COATED ORAL at 09:17

## 2021-06-29 RX ADMIN — METOPROLOL TARTRATE 5 MG: 1 INJECTION, SOLUTION INTRAVENOUS at 14:33

## 2021-06-29 RX ADMIN — DEXAMETHASONE SODIUM PHOSPHATE 4 MG: 4 INJECTION, SOLUTION INTRAMUSCULAR; INTRAVENOUS at 16:02

## 2021-06-29 ASSESSMENT — PAIN SCALES - GENERAL
PAINLEVEL_OUTOF10: 0
PAINLEVEL_OUTOF10: 0
PAINLEVEL_OUTOF10: 2
PAINLEVEL_OUTOF10: 0

## 2021-06-29 ASSESSMENT — PAIN DESCRIPTION - PROGRESSION: CLINICAL_PROGRESSION: GRADUALLY IMPROVING

## 2021-06-29 NOTE — PROGRESS NOTES
TRAUMA SURGERY  ATTENDING PROGRESS NOTE      CC: fall     S:   doing well not much pain, no weakness or paresthesias     O:   @/75   Pulse 70   Temp 97.8 °F (36.6 °C) (Temporal)   Resp 16   Ht 6' (1.829 m)   Wt 252 lb 9.6 oz (114.6 kg)   SpO2 94%   BMI 34.26 kg/m² @    Gen - no apparent distress   Neuro - Awake, alert, attentive    HEENT - PERRL 3mm scalp wounds CDI, dressings in place   Lungs - non labored, BS clear b/l    Heart - RR no extra heart sounds    Abdomen - SNT  Spine -   Non tender C/T/L  Ext- ROM WNl NVI     A/P: 75 yo s/p fall with SDH      Active Problems:    Fall at home, sequela    SDH (subdural hematoma) (HCC)  Resolved Problems:    * No resolved hospital problems. *      - SDH ns following, keppra,    - hyperthyroidism.  Endocrine consulted,   - low platelets, heme onc consulted   - home meds, hold plavix,    - SMI deep breathing   - PTOT d/c planning       DVT prophylaxis: SCDs, Luis Angel Bennett MD FACS

## 2021-06-29 NOTE — PROGRESS NOTES
Occupational Therapy  OT BEDSIDE TREATMENT NOTE   [address] [facility] [place] [address] [address]  [address] [place] [address]       Date:2021  Patient Name: Monica Rockwell  MRN: [id number]  : 1945  Room: [address] [place] [address]     Per OT Eval:    Evaluating OT: Santa Zheng OTR/L #[id number]  Referring Provider:Tonia Barcenas DO  Specific Provider Orders: OT eval and treat; 21     Diagnosis:  Fall at home, sequela [W19. XXXS, B90.778]  Reason for Admission: fatigue, weakness and back pain  SDH  Surgery/Procedure: CRANIOTOMY REMINGTON HOLES 21  Pertinent Medical History: CAD, cardiomyopathy, chest pain, HLD, HTN, thyroid disease      Precautions:  Fall Risk     Assessment of current deficits   [x]? Functional mobility             [x]?ADLs           [x]? Strength                  []?Cognition   [x]? Functional transfers           [x]? IADLs         [x]? Safety Awareness   [x]? Endurance   [x]? Fine Coordination              [x]? Balance      [x]? Vision/perception   [x]? Sensation     []? Gross Motor Coordination  []? ROM           []?  Delirium                   []? Motor Control      OT PLAN OF CARE   OT POC based on physician orders, patient diagnosis and results of clinical assessment     Frequency/Duration: 1-3 days/wk for 2 weeks PRN   Specific OT Treatment to include:   * Instruction/training on adapted ADL techniques and AE recommendations to increase functional independence within precautions       * Training on energy conservation strategies, correct breathing pattern and techniques to improve independence/tolerance for self-care routine  * Functional transfer/mobility training/DME recommendations for increased independence, safety, and fall prevention  * Patient/Family education to increase follow through with safety techniques and functional independence  * Recommendation of environmental modifications for increased safety with functional transfers/mobility and ADLs  * Sensory re-education to improve body/limb awareness, maintain/improve skin integrity, and improve hand/UE motor function  * Therapeutic exercise to improve motor endurance, ROM, and functional strength for ADLs/functional transfers  * Therapeutic activities to facilitate/challenge dynamic balance, stand tolerance for increased safety and independence with ADLs  * Therapeutic activities to facilitate gross/fine motor skills for increased independence with ADLs  * Visual-perceptual training to improve environmental scanning, visual attention/focus, and oculomotor skills for increased safety/independence with functional transfers/mobility and ADLs     Recommended Adaptive Equipment: BSC, wheeled walker      Home Living: Pt lives with wife and daughter in a ranch style house w/ 3 [address]ACLY[address]( B handrails); bed/bath/laundry on 1st floor  Bathroom setup: walk-in shower w/ built-in seat; standard commode   Equipment owned: none     Prior Level of Function: Independent with ADLs , Independent with IADLs; ambulated w/ no AD  Driving: Yes  Occupation: retired  Leisure: likes to swim     Pain Level: Pt reports 0/10 pain this session  Cognition: A&O: 4/4; Follows 2 step directions              Memory:  Fair+              Sequencing:  Fair+              Problem solving:  Fair+              Judgement/safety:  Fair+                Functional Assessment:  AM-PAC Daily Activity Raw Score: 18/24    Initial Eval Status  Date: 6/28/21 Treatment Status  Date:  6/29/21 STGs = LTGs  Time frame: 10-14 days   Feeding Independent        Grooming Minimal Assist (standing at sink) SBA  Seated washing off his face before standing  Independent    UB Dressing Minimal Assist   SBA  Doff/juan gown seated  Independent    LB Dressing Minimal Assist (to don/doff socks with AE-seated)  Min A  Pt able to juan pants over B LE, with no AE this date, seated then standing with Good results Modified [place]    Bathing Moderate Assist  Min/Mod A  Simulated task, seated  Pt has shower chair &  shower head, recommending grab bar for steadiness Modified [place]    Toileting Moderate Assist  Min A  Simulated task   Modified [place]    Bed Mobility  Supine to sit: Minimal Assist   Sit to supine: NT SBA  Supine to sit with HOB slightly elevated  Supine to sit: Independent   Sit to supine: Independent    Functional Transfers Minimal Assist   CGA  With walker, cues for hand placement  Modified [place]    Functional Mobility Minimal Assist w/ ww (short distance in hallway and to/from bathroom sink)--cueing for spatial awareness CGA  With walker, household distances, noted small short steps  (family reports pt did shuffle his feet)  Modified [place]    Balance Sitting:     Dynamic: SBA  Standing: Min. A w/ ww  Sitting: SBA  Standing: CGA  Mildly unsteady but no LOB Sitting:     Dynamic:Indep  Standing: Mod. I   Activity Tolerance Fair (limited d/t dizziness, monitored throughout session)  Fair  No dizziness, reports just feeling \"a little off\" good   Visual/  Perceptual Glasses: none     Impaired spatial awareness during mobility noted; continue to assess in session          Education:  Pt was educated through out treatment regarding proper technique & safety with bed mobility, functional transfers & mobility, walker safety & ADL compensatory strategies to ease tasks, improve safety & prevent falls to return home safely. Family arrived during treatment & were educated as well on safety & technique with transfers & mobility as well as AD for ADL tasks with Good understanding. Recommending grab bar for stability when stepping in & out of shower, as well as family requesting BSC due to low commode at home. Recommending remove any small rugs to prevent tripping hazards due to use of walker at this time with good understanding. Comments: Upon arrival pt was in bed & agreeable for therapy.  At end of session pt was seated in chair, family present, all lines and tubes intact, call light within reach. · Pt has made Good progress towards set goals. · Continue with current plan of care      Treatment Time In: 9:40           Treatment Time Out: 10:18          Treatment Charges: Mins Units   Ther Ex  [id number]     Manual Therapy [id number] [facility]     Thera Activities [id number] 15 1   ADL/Home Mgt [id number] 23 2   Neuro Re-ed [id number]     Group Therapy      Orthotic manage/training  [id number]     Non-Billable Time     Total Timed Treatment 38 3       Elida TUCKER  [address] [place] [address] Drive, [address] [place] [address]

## 2021-06-29 NOTE — CONSULTS
[facility]  Hematology/Oncology  Consult      Patient Name: Ibis Steven  YOB: 1945  PCP: Austin Wing DO   Referring Provider:      Reason for Consultation:   Chief Complaint   Patient presents with    Trauma     Consult from [facility] for SDH        History of Present Illness: This is a 78-year-old male with a PMH of CAD on plavix, cardiomyopthay, NSTEMI, HTN, HLD, gout, and hyperthyroidism who presented to the ED for dizziness. CT scan found to have an acute on chronic subdural hematoma. Neurosurgery was consulted and a right frontal ellie hole to drain hematoma and placement of subdural drain was done. The drain has since been removed. Patient then had left hand numbness post drain removal. A repeat CT head was done showing no acute intracranial abnormality. Patient was recommended to continue on conservative management of decardron as numbness had resolved. CBC was also significant for thrombocytopenia. That started on 6/25. Consultation for further evaluation and recommendations for thrombocytopenia.      Diagnostic Data:     Past Medical History:   Diagnosis Date    Bilateral renal cysts 10/13/2014    CAD (coronary artery disease)     Cardiomyopathy ([address]     Chest pain     8-4-2016 lexiscan stress    History of gout 10/13/2014    History of pulmonary embolism 10/13/2014    Hyperlipidemia     Hypertension     Hyperthyroidism 10/13/2014    NSTEMI (non-ST elevated myocardial infarction) ([address] 10/13/2014    Obesity due to excess calories     Thyroid cyst 10/13/2014    Thyroid disease        Patient Active Problem List    Diagnosis Date Noted    Fall at home, sequela 06/24/2021    SDH (subdural hematoma) ([address] 06/24/2021    Coronary artery disease involving native coronary artery of native heart 08/03/2016    NSTEMI (non-ST elevated myocardial infarction) ([address] 10/13/2014    Hyperthyroidism 10/13/2014    History of pulmonary embolism 10/13/2014    History of gout 10/13/2014    Thyroid cyst 10/13/2014    Bilateral renal cysts 10/13/2014    CKD (chronic kidney disease) stage 3, GFR 30-59 ml/min 10/12/2014        Past Surgical History:   Procedure Laterality Date    BACK SURGERY      CORONARY ANGIOPLASTY WITH STENT PLACEMENT  10-    Dr. Jamaica Lopez 3.0x18 MID LAD    CRANIOTOMY Right 6/25/2021    RIGHT FRONTAL REMINGTON HOLE TO DRAIN HEMATOMA AND PLACEMENT OF SUBDURAL DRAIN performed by Kayla Lantigua MD at [address] CATH LAB PROCEDURE  10/13/2014    Dr. Orquidea Cruz: PCI to LAD       Family History  Family History   Problem Relation Age of Onset    Heart Disease Mother         cabg 3    Heart Disease Father         pacemaker       Social History    TOBACCO:   reports that he has never smoked. He has never used smokeless tobacco.  ETOH:   reports current alcohol use. Home Medications  Prior to Admission medications    Medication Sig Start Date End Date Taking? Authorizing Provider   atorvastatin (LIPITOR) 20 MG tablet Take 20 mg by mouth nightly   Yes Historical Provider, MD   lisinopril (PRINIVIL;ZESTRIL) 10 MG tablet Take 10 mg by mouth daily   Yes Historical Provider, MD   diphenhydrAMINE (BENADRYL) 50 MG capsule Take 100 mg by mouth nightly as needed for Sleep   Yes Historical Provider, MD   colchicine-probenecid 0.5-500 MG per tablet TAKE ONE TABLET BY MOUTH EVERY DAY 10/7/20  Yes Historical Provider, MD   clopidogrel (PLAVIX) 75 MG tablet Take 1 tablet by mouth daily 9/15/20  Yes Cassidy Marquez MD   hydrochlorothiazide (MICROZIDE) 12.5 MG capsule Take 12.5 mg by mouth daily   Yes Historical Provider, MD   metoprolol succinate ER (TOPROL-XL) 100 MG XL tablet Take 100 mg by mouth daily   Yes Historical Provider, MD   methimazole (TAPAZOLE) 5 MG tablet Take 7.5 mg by mouth daily. 1.5 tabs   Yes Historical Provider, MD   allopurinol (ZYLOPRIM) 300 MG tablet Take 300 mg by mouth daily.    Yes Historical Provider, MD   nitroGLYCERIN (NITROSTAT) 0.4 MG SL tablet Place 1 tablet under the tongue every 5 minutes as needed for Chest pain. 10/14/14   Ernesto Ibarra MD       Allergies  No Known Allergies    Review of Systems: Fatigue, mild pain     Constitutional:  No fever chills or rigors.  Eyes: No changes in vision, discharge, or pain   ENT: No Headaches, hearing loss or vertigo. No mouth sores or sore throat. No change in taste or smell.  Cardiovascular: No chest discomfort, dyspnea on exertion, palpitations or loss of consciousness. or phlebitis.  Respiratory: Has no cough or wheezing, Has no sputum production. Has no hemoptysis, Has no pleuritic pain, .  Gastrointestinal: No abdominal pain, appetite loss, blood in stools. No change in bowel habits. No hematemesis    Genitourinary: Patient acknowledges no dysuria, trouble voiding, or hematuria. No nocturia or increased frequency.  Musculoskeletal: No gait disturbance, weakness or joint complaints.  Integumentary: No rash or pruritis.  Neurological: No headache, diplopia, change in muscle strength, numbness or tingling. No change in gait, balance, coordination, mood, affect, memory, mentation, behavior.  Psychiatric: No anxiety, or depression.  Endocrine: No temperature intolerance. No excessive thirst, fluid intake, or urination. No tremor.  Hematologic/Lymphatic: No abnormal bruising or bleeding, blood clots or swollen lymph nodes.  Allergic/Immunologic: No nasal congestion or hives. Objective  /75   Pulse 70   Temp 97.8 °F (36.6 °C) (Temporal)   Resp 16   Ht 6' (1.829 m)   Wt 252 lb 9.6 oz (114.6 kg)   SpO2 94%   BMI 34.26 kg/m²     Physical Exam:     General: AAO to person, place, time, in no acute distress,   Head and neck : PERRLA, EOMI . Sclera non icteric. Oropharynx : Clear  Neck: no JVD,  no adenopathy  Heart: Regular rate and regular rhythm, no murmur  Lungs: Clear to auscultation   Extremities: No edema,no cyanosis, no clubbing. Abdomen: Soft, non-tender;no masses, no organomegaly  Skin:  No rash. Scalp wound clean dry intact. Neurologic:Cranial nerves grossly intact. No focal motor or sensory deficits . Recent Laboratory Data-   Lab Results   Component Value Date    WBC 5.4 06/28/2021    HGB 15.4 06/28/2021    HCT 46.3 06/28/2021    MCV 96.7 06/28/2021    PLT 92 (L) 06/28/2021    LYMPHOPCT 8.6 (L) 06/24/2021    RBC 4.79 06/28/2021    MCH 32.2 06/28/2021    MCHC 33.3 06/28/2021    RDW 13.7 06/28/2021    NEUTOPHILPCT 77.9 06/24/2021    MONOPCT 10.7 06/24/2021    BASOPCT 0.3 06/24/2021    NEUTROABS 5.99 06/24/2021    LYMPHSABS 0.66 (L) 06/24/2021    MONOSABS 0.82 06/24/2021    EOSABS 0.14 06/24/2021    BASOSABS 0.02 06/24/2021       Lab Results   Component Value Date     06/28/2021    K 5.0 06/28/2021     06/28/2021    CO2 21 (L) 06/28/2021    BUN 24 (H) 06/28/2021    CREATININE 1.4 (H) 06/28/2021    GLUCOSE 93 06/28/2021    CALCIUM 9.3 06/28/2021    PROT 6.8 06/24/2021    LABALBU 3.4 (L) 06/24/2021    BILITOT 1.2 06/24/2021    ALKPHOS 85 06/24/2021    AST 25 06/24/2021    ALT 20 06/24/2021    LABGLOM 49 06/28/2021    GFRAA 60 06/28/2021       No results found for: IRON, TIBC, FERRITIN        Radiology-    XR CHEST (2 VW)    Result Date: 6/24/2021  EXAMINATION: TWO XRAY VIEWS OF THE CHEST 6/24/2021 3:07 pm COMPARISON: The/3/16. HISTORY: ORDERING SYSTEM PROVIDED HISTORY: chest pain TECHNOLOGIST PROVIDED HISTORY: Reason for exam:->chest pain FINDINGS: The lungs are without acute focal process. There is no effusion or pneumothorax. The cardiomediastinal silhouette is without acute process. The osseous structures are without acute process. No acute process.      CT HEAD WO CONTRAST    Result Date: 6/28/2021  EXAMINATION: CT OF THE HEAD WITHOUT CONTRAST; CT OF THE CERVICAL SPINE WITHOUT CONTRAST 6/28/2021 3:48 pm TECHNIQUE: CT of the head was performed without the administration of intravenous contrast. Dose modulation, iterative reconstruction, and/or weight based adjustment of the mA/kV was utilized to reduce the radiation dose to as low as reasonably achievable.; CT of the cervical spine was performed without the administration of intravenous contrast. Multiplanar reformatted images are provided for review. Dose modulation, iterative reconstruction, and/or weight based adjustment of the mA/kV was utilized to reduce the radiation dose to as low as reasonably achievable. COMPARISON: CT head without contrast, 06/26/2021 HISTORY: ORDERING SYSTEM PROVIDED HISTORY: s/p ellie hole TECHNOLOGIST PROVIDED HISTORY: Reason for exam:->s/p ellie hole Has a \"code stroke\" or \"stroke alert\" been called? ->No What reading provider will be dictating this exam?->CRC FINDINGS: CT OF THE BRAIN: BRAIN/VENTRICLES: Compared to 06/26/2021, there is interval removal of the right frontal subdural drain. There is a persistent mixed density subdural hematoma along the right frontal convexity, approximately measuring 1.9 cm in maximal width. It exerts mass effect on the right frontal lobe. No significant midline shift is seen. No brain edema or hemorrhage is seen. No hydrocephalus. There is a 3.7 x 5.1 cm arachnoid cyst in the left anterior middle cranial fossa, with mass effect on the left anterior temporal lobe. ORBITS: The visualized portion of the orbits demonstrate no acute abnormality. SINUSES: The visualized paranasal sinuses and mastoid air cells demonstrate no acute abnormality. SOFT TISSUES/SKULL: A ellie hole with an overlying titanium closure clip are seen in the right frontal bone. The calvarium is otherwise unremarkable. CT CERVICAL SPINE: BONES/ALIGNMENT: There is no acute fracture or traumatic malalignment. DEGENERATIVE CHANGES: Prominent disc degenerative changes noted, with significant loss of disc heights at multiple levels, worst at C2-3 and C5-6. The central canal is diffusely mildly stenotic as a result of short pedicles.  Moderate and severe grade neural foraminal stenoses noted at multiple levels. SOFT TISSUES: There is no prevertebral soft tissue swelling. CT head without contrast: 1. No acute intracranial abnormality. 2. Grossly stable 1.9 cm wide right frontoparietal subdural hemorrhage. 3. Since 06/26/2021, there is interval removal of the right frontal subdural drain. CT cervical spine without contrast: 1. No fracture or joint dislocation is seen. 2. Degenerative changes, as described. CT Head wo Contrast    Result Date: 6/26/2021  EXAMINATION: CT OF THE HEAD WITHOUT CONTRAST  6/26/2021 6:20 am TECHNIQUE: CT of the head was performed without the administration of intravenous contrast. Dose modulation, iterative reconstruction, and/or weight based adjustment of the mA/kV was utilized to reduce the radiation dose to as low as reasonably achievable. COMPARISON: 06/24/2021. HISTORY: ORDERING SYSTEM PROVIDED HISTORY: s/p ellie hole TECHNOLOGIST PROVIDED HISTORY: Reason for exam:->s/p ellie hole Has a \"code stroke\" or \"stroke alert\" been called? ->No What reading provider will be dictating this exam?->CRC FINDINGS: Patient is status post interval placement of a right frontal ellie hole and subdural drain with the marginal improvement in the previously noted mixed density subdural hematoma which currently measures 2.1 cm in thickness which may represent a combination of late acute and subacute subdural hematoma with improving mass effect. There is no midline shift. There is diffuse cortical atrophy with small vessel ischemic disease. No other significant changes are noted. Interval placement of a right frontal ellie hole and subdural drain with marginal improvement in the previously noted late acute and subacute subdural hematoma with improving mass effect.      CT HEAD WO CONTRAST    Result Date: 6/24/2021  EXAMINATION: CT OF THE HEAD WITHOUT CONTRAST  6/24/2021 7:40 pm TECHNIQUE: CT of the head was performed without the administration of intravenous contrast. Dose modulation, iterative reconstruction, and/or weight based adjustment of the mA/kV was utilized to reduce the radiation dose to as low as reasonably achievable. COMPARISON: 1523 hours HISTORY: ORDERING SYSTEM PROVIDED HISTORY: follow-up CT from 1524 TECHNOLOGIST PROVIDED HISTORY: Has a \"code stroke\" or \"stroke alert\" been called? ->No Reason for exam:->follow-up CT from 1524 Decision Support Exception - unselect if not a suspected or confirmed emergency medical condition->Emergency Medical Condition (MA) What reading provider will be dictating this exam?->CRC FINDINGS: Heterogeneous right frontal extra-axial collection consistent with acute on chronic subdural hematoma unchanged since the prior examination. Mass effect on the right frontal lobe, unchanged. No new areas of intracranial hemorrhage or hematoma. Age-related loss of brain volume and chronic periventricular ischemic changes again noted. Left temporal fossa arachnoid cyst again noted. 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 Right frontal acute on chronic subdural hematoma, unchanged since the prior examination. CT HEAD WO CONTRAST    Result Date: 6/24/2021  EXAMINATION: CT OF THE HEAD WITHOUT CONTRAST  6/24/2021 3:18 pm TECHNIQUE: CT of the head was performed without the administration of intravenous contrast. Dose modulation, iterative reconstruction, and/or weight based adjustment of the mA/kV was utilized to reduce the radiation dose to as low as reasonably achievable. COMPARISON: None. HISTORY: ORDERING SYSTEM PROVIDED HISTORY: dizziness TECHNOLOGIST PROVIDED HISTORY: Has a \"code stroke\" or \"stroke alert\" been called? ->No Reason for exam:->dizziness Decision Support Exception - unselect if not a suspected or confirmed emergency medical condition->Emergency Medical Condition (MA) FINDINGS: BRAIN/VENTRICLES: There is a extra-axial mixed attenuated fluid collection seen along the right frontal convexity measuring 9 cm and transverse diameter by 2.8 cm in AP diameter by 7 cm in craniocaudal dimension. This finding represents an acute on chronic subdural hematoma. There is hyperdense material within the extra-axial subdural hematoma consistent with a mixture of acute blood. There is effacement of the right frontal lobe. There is no intraparenchymal hemorrhage. There is no evidence of hydrocephalus. There is no midline shift. The ventricles, cisterns and sulci are prominent consistent with atrophy. There is an extra-axial CSF density focus along the anterior aspect of the left temporal lobe consistent with an arachnoid cyst. ORBITS: The visualized portion of the orbits demonstrate no acute abnormality. SINUSES: The visualized paranasal sinuses and mastoid air cells demonstrate no acute abnormality. SOFT TISSUES/SKULL:  No acute abnormality of the visualized skull or soft tissues. 1. 9 cm x 7 cm x 2.8 cm extra-axial mixed attenuated fluid collection seen within the right frontal convexity consistent with a acute on chronic subdural hematoma. 2. There is effacement of the right frontal lobe 3. There is no midline shift. 4. Left temporal fossa arachnoid cyst 5. This report was called to the emergency room following dictation. CT CERVICAL SPINE WO CONTRAST    Result Date: 6/28/2021  EXAMINATION: CT OF THE HEAD WITHOUT CONTRAST; CT OF THE CERVICAL SPINE WITHOUT CONTRAST 6/28/2021 3:48 pm TECHNIQUE: CT of the head was performed without the administration of intravenous contrast. Dose modulation, iterative reconstruction, and/or weight based adjustment of the mA/kV was utilized to reduce the radiation dose to as low as reasonably achievable.; CT of the cervical spine was performed without the administration of intravenous contrast. Multiplanar reformatted images are provided for review.  Dose modulation, iterative reconstruction, and/or weight based adjustment of the mA/kV was utilized to reduce the radiation dose to as low as reasonably achievable. COMPARISON: CT head without contrast, 06/26/2021 HISTORY: ORDERING SYSTEM PROVIDED HISTORY: s/p ellie hole TECHNOLOGIST PROVIDED HISTORY: Reason for exam:->s/p ellie hole Has a \"code stroke\" or \"stroke alert\" been called? ->No What reading provider will be dictating this exam?->CRC FINDINGS: CT OF THE BRAIN: BRAIN/VENTRICLES: Compared to 06/26/2021, there is interval removal of the right frontal subdural drain. There is a persistent mixed density subdural hematoma along the right frontal convexity, approximately measuring 1.9 cm in maximal width. It exerts mass effect on the right frontal lobe. No significant midline shift is seen. No brain edema or hemorrhage is seen. No hydrocephalus. There is a 3.7 x 5.1 cm arachnoid cyst in the left anterior middle cranial fossa, with mass effect on the left anterior temporal lobe. ORBITS: The visualized portion of the orbits demonstrate no acute abnormality. SINUSES: The visualized paranasal sinuses and mastoid air cells demonstrate no acute abnormality. SOFT TISSUES/SKULL: A ellie hole with an overlying titanium closure clip are seen in the right frontal bone. The calvarium is otherwise unremarkable. CT CERVICAL SPINE: BONES/ALIGNMENT: There is no acute fracture or traumatic malalignment. DEGENERATIVE CHANGES: Prominent disc degenerative changes noted, with significant loss of disc heights at multiple levels, worst at C2-3 and C5-6. The central canal is diffusely mildly stenotic as a result of short pedicles. Moderate and severe grade neural foraminal stenoses noted at multiple levels. SOFT TISSUES: There is no prevertebral soft tissue swelling. CT head without contrast: 1. No acute intracranial abnormality. 2. Grossly stable 1.9 cm wide right frontoparietal subdural hemorrhage. 3. Since 06/26/2021, there is interval removal of the right frontal subdural drain.  CT cervical spine without contrast: 1.  No fracture or joint dislocation is seen. 2. Degenerative changes, as described. ASSESSMENT/PLAN :  20-year-old male    -CAD previously  on plavix which has been held, cardiomyopthay, NSTEMI, HTN, HLD, gout, and hyperthyroidism     -CT head found to have an acute on chronic subdural hematoma.     -Neurosurgery was consulted and a right frontal ellie hole to drain hematoma and placement of subdural drain was done. The drain has since been removed.     -Left hand numbness post drain removal. -A repeat CT head was done showing no acute intracranial abnormality.  -Recommended to continue on conservative management of decardron as numbness had resolved. -Endocrinology also consulted for evaluation of hyperthyroidism. Patient states he has been on Methimazole for years. Thyroid labs normal. US thyroid ordered. Patient with transient moderate thrombocytopenia likely related to a transient consumptive process  His thrombocytopenia has essentially resolved and his platelet count today is up to normal at 132  He was reassured. We will sign off          LOLA Warren - CNP  Electronically signed 6/29/2021 at 9:50 AM  Patient seen and examined, note edited to reflect above.   Sharita Shaffer MD

## 2021-06-29 NOTE — PLAN OF CARE
Problem: Falls - Risk of:    Goal: Absence of physical injury  Description: Absence of physical injury    Outcome: Met This Shift

## 2021-06-29 NOTE — PROGRESS NOTES
Physical Therapy  Physical Therapy Rx Note    Name: Ibis Steven  : 1945  MRN: [id number]      Date of Service: 2021    Evaluating PT:  Pancho Nicolas, PT, DPT [id number]     Room #:  8474/2230-U  Diagnosis:  Fall at home, sequela [W19. XXXS, C13.276]  Reason for admission: multiple falls, SDH   Precautions:  Falls  Procedure/Surgery:   R frontal ellie hole with drain insertion  Equipment Recommendations:  FWW    SUBJECTIVE:  Pt lives with wife and daughter in a 1 story home with 3 stair(s) to enter and 2 rail(s). Bed is on 1st floor and bath is on 1st floor. Pt ambulated with no AD PTA. OBJECTIVE:   Initial Evaluation  Date:  Treatment     Short Term/ Long Term   Goals   AM-PAC 6 Clicks 43/42 16    Was pt agreeable to Eval/treatment? Yes  Yes     Does pt have pain?  Denies pain No co pain    Bed Mobility  Rolling: NT  Supine to sit: Marcus  Sit to supine: NT  Scooting: Marcus Supine to sit with hob slightly elevated and rails  sba   Sit to supine nt  Scooting sba  Independent    Transfers Sit to stand: Marcus  Stand to sit: Marcus  Stand pivot: Marcus [employer] Sit to stand min  Stand to sit min   Stand pivot ww min  Independent    Ambulation    80 feet with [employer] Marcus   150 feet ww min/cga  Cues walker approx   Mildly unsteady  >300 feet with AAD Mod I vs independent    Stair negotiation: ascended and descended  NT 4 steps 2 rails sba    3 steps with 2 rail Mod I    ROM BUE:  See OT eval   BLE:  WFL     Strength BUE:  See OT eval   BLE:  knee ext 5/5  Ankle DF 5/5  Increase by 1/3 MMT grade    Balance Sitting EOB:  SBA  Dynamic Standing:  Marcus Sitting eob supervision  Dynamic standing ww min assist  Sitting EOB:  independent  Dynamic Standing:  indep     -Pt is A & O x 3  -Sensation:  Pt denies numbness and tingling to extremities  -Edema:  unremarkable     Therapeutic Exercises:  Functional activity     Patient education  Pt educated on safety, sequencing of transfers, function     Patient response to education: Pt verbalized understanding Pt demonstrated skill Pt requires further education in this area   Yes  Partial  Yes      ASSESSMENT:  Conditions Requiring Skilled Therapeutic Intervention:  [x]Decreased strength     []Decreased ROM  [x]Decreased functional mobility  [x]Decreased balance   [x]Decreased endurance   [x]Decreased posture  [x]Decreased sensation  [x]Decreased coordination   []Decreased vision  [x]Decreased safety awareness   []Increased pain       Comments:  Pt received supine in bed and agreeable to PT session. Pt reports sleep number bed that elevates at home. Pt had hob slighly elevated and using rails sba. Upon sit to stand first time pt unsteady. Pt sat back down and repeated with improvement. Pt increased distance with gait was walking better but unsteady min/cga. Cues for step length due to shuffling at times . There was a lot of activity in the leal way,  family came,  carts and people, which distracted pt some what and he had increase unsteadiness and decrease focus. Pt family came as starting steps and they observed pt ascending steps with B handrails with cga for safety and pt doing non reciprocal.  Educated family on steps and safety. Pt admitted to family that he felt  \"off \" after completing steps and ambulaion. Educated family on how to do car transfer and guard pt and safety as well as steps. Dtr lives with pt and will be managing car along with his spouse. Will see pt daily to work on ambulation/function. Pt with all needs met and call light in reach and family present. Pt would benefit from continued PT POC to address functional deficits described above. Treatment:  Patient practiced and was instructed in the following treatment:    Patient education provided continuously throughout session for sequencing, safety maintenance, and improving any deficits found during the evaluation.   Bed mobility training - pt given verbal and tactile cues to facilitate proper sequencing and safety during rolling and supine>sit as well as provided with physical assistance to complete task    STS and pivot transfer training - pt educated on proper hand and foot placement, safety and sequencing, and use of WW to safely complete sit<>stand and pivot transfers with hands on assistance to complete task safely    Gait training- pt was given verbal and tactile cues to facilitate improved balance and speed during ambulation as well as provided with physical assistance to complete task. Pt's/ family goals   1. Return home     Patient and or family understand(s) diagnosis, prognosis, and plan of care. Yes     Prognosis is fair for reaching above PT goals. PHYSICAL THERAPY PLAN OF CARE:    PT POC is established based on physician order and patient diagnosis     Referring provider/PT Order:    06/25/21 0130  PT evaluation and treat       Tonia Sevilla DO     Diagnosis:  Fall at home, sequela [W19. XXXS, W37.180]  Specific instructions for next treatment:  Progress transfers. Progress gait training - increased distance. Stair training. Therex.  Balance training    Current Treatment Recommendations:   [x] Strengthening to improve independence with functional mobility   [] ROM to improve independence with functional mobility   [x] Balance Training to improve static/dynamic balance and to reduce fall risk  [x] Endurance Training to improve activity tolerance during functional mobility   [x] Transfer Training to improve safety and independence with all functional transfers   [x] Gait Training to improve gait mechanics, endurance and asses need for appropriate assistive device  [x] Stair Training in preparation for safe discharge home and/or into the community   [] Positioning to prevent skin breakdown and contractures  [x] Safety and Education Training   [] Patient/Caregiver Education   [] HEP  [] Other     PT long term treatment goals are located in above grid    Frequency of treatments: 2-5x/week x 1-2 weeks.     Time in  940   Time out  1018    Total Treatment Time  38 minutes     CPT codes:  [] Low Complexity PT evaluation 18406  [] Moderate Complexity PT evaluation 65621  [] High Complexity PT evaluation 34729  [] PT Re-evaluation 39630  [] Gait training 52509 - minutes  [] Manual therapy 56671 - minutes  [x] Therapeutic activities 06407 38 minutes  [] Therapeutic exercises 78628 - minutes  [] Neuromuscular reeducation 25048 - minutes     Franck Armstrong, PTA  [id number]

## 2021-06-29 NOTE — CONSULTS
ENDOCRINOLOGY INITIAL CONSULTATION NOTE    Date of Admission: 6/24/2021  Date of Service: 6/29/2021  Admitting Physician: Kaitlyn Arreola MD   Primary Care Physician: Girish Guzman DO  Consultant physician: Salina Aaron MD     Reason for the consultation:  Hyperthyroidism     History of Present Illness: The history is provided by the patient. Accuracy of the patient data is excellent. Rosa M Munguia is a very pleasant 76 y.o. old male with PMH of  HTN, HLD, CAD, CAD and other listed below admitted to [facility] on 6/24/2021 because of head injury after a fall, endocrine service was consulted for management of hyperthyroidism   Apparently, the patient fell and hit his head 2 weeks before admission and then again on Tuesday June 22nd. He did not seek any medical attention until 6/24. the patient has been feeling dizzy and off balance ever since first fall 2 weeks ago. He does take Plavix for coronary artery disease  CT scan found to have an acute on chronic subdural hematoma    The patient was diagnosed with hyperthyroidism long time ago and has been on Methimazole for many years.  Currently on Methimazole 7.5 mg daily   Thyroid labs this admission showed normal TFT as shown below   Lab Results   Component Value Date/Time    TSH 1.330 06/28/2021 06:59 PM    T4FREE 1.05 06/28/2021 06:59 PM    Z9URXKO 4.9 10/03/2013 12:27 PM     Past Medical History   Past Medical History:   Diagnosis Date    Bilateral renal cysts 10/13/2014    CAD (coronary artery disease)     Cardiomyopathy ([facility]     Chest pain     8-4-2016 lexiscan stress    History of gout 10/13/2014    History of pulmonary embolism 10/13/2014    Hyperlipidemia     Hypertension     Hyperthyroidism 10/13/2014    NSTEMI (non-ST elevated myocardial infarction) ([address] 10/13/2014    Obesity due to excess calories     Thyroid cyst 10/13/2014    Thyroid disease        Past Surgical History   Past Surgical History:   Procedure Laterality Date    BACK SURGERY  CORONARY ANGIOPLASTY WITH STENT PLACEMENT  10-    Dr. Giulia Rutherford- [place]  3.0x18 MID LAD    CRANIOTOMY Right 6/25/2021    RIGHT FRONTAL REMINGTON HOLE TO DRAIN HEMATOMA AND PLACEMENT OF SUBDURAL DRAIN performed by Jt Adkins MD at [address] CATH LAB PROCEDURE  10/13/2014    Dr. Giulia Rutherford: PCI to LAD       Social history   Tobacco:   reports that he has never smoked. He has never used smokeless tobacco.  Alcohol:   reports current alcohol use. Drugs:   reports no history of drug use. Family history   Family History   Problem Relation Age of Onset    Heart Disease Mother         cabg 3    Heart Disease Father         pacemaker       Allergies and Drug reactions  No Known Allergies    Scheduled Meds:   dexamethasone  4 mg Intravenous Q6H    metoprolol  5 mg Intravenous Q6H    levETIRAcetam  500 mg Oral BID    sodium chloride flush  10 mL Intravenous 2 times per day    polyethylene glycol  17 g Oral Daily    atorvastatin  20 mg Oral Nightly    probenecid  500 mg Oral Daily    And    colchicine  0.6 mg Oral Daily    allopurinol  300 mg Oral Daily    [Held by provider] methIMAzole  7.5 mg Oral Daily     PRN Meds:   acetaminophen, 650 mg, Q6H PRN  butalbital-acetaminophen-caffeine, 2 tablet, Q4H PRN  melatonin, 5 mg, Nightly PRN  sodium chloride flush, 10 mL, PRN  sodium chloride, 25 mL, PRN  ondansetron, 4 mg, Q8H PRN   Or  ondansetron, 4 mg, Q6H PRN  fleet, 1 enema, Daily PRN  polyethylene glycol, 17 g, Daily PRN  ondansetron, 4 mg, Q8H PRN   Or  ondansetron, 4 mg, Q6H PRN      Continuous Infusions:   sodium chloride         Review of Systems  All systems reviewed.  All negative except for symptoms mentioned in HPI     OBJECTIVE    /75   Pulse 70   Temp 97.8 °F (36.6 °C) (Temporal)   Resp 16   Ht 6' (1.829 m)   Wt 252 lb 9.6 oz (114.6 kg)   SpO2 94%   BMI 34.26 kg/m²   Wt Readings from Last 6 Encounters:   06/26/21 252 lb 9.6 oz (114.6 kg)   06/24/21 248 lb (112.5 kg)   10/23/20 248 lb (112.5 kg)   09/19/19 247 lb 1.9 oz (112.1 kg)   12/12/18 240 lb (108.9 kg)   08/04/16 251 lb (113.9 kg)     Physical examination:  Due to this being a TeleHealth encounter, evaluation of the following organ systems is limited: Vitals/Constitutional/EENT/Resp/CV/GI//MS/Neuro/Skin/Heme-Lymph-Imm. Modified physical exam through Telemedicine camera    General: Communicating well via camera   Neck: no obvious neck mass. No obvious neck deformity     CVS: no distress   Chest: no distress. Chest is moving with respiration    Extremities:  no visible tremor  Skin: No visible rashes as seen from camera   Musculoskeletal: no visible deformity  Neuro: Alert and oriented to person, place, and time. Psychiatric: Normal mood and affect. Behavior is normal    Review of Laboratory Data:  I personally reviewed the following labs:  Recent Labs     06/27/21  0430 06/28/21  0539   WBC 5.5 5.4   RBC 4.77 4.79   HGB 15.3 15.4   HCT 46.4 46.3   MCV 97.3 96.7   MCH 32.1 32.2   MCHC 33.0 33.3   RDW 13.7 13.7   PLT 95* 92*   MPV 10.5 11.7     Recent Labs     06/27/21  0430 06/28/21  0539    138   K 4.1 5.0    106   CO2 21* 21*   BUN 24* 24*   CREATININE 1.3* 1.4*   GLUCOSE 107* 93   CALCIUM 8.9 9.3     No results found for: BHYDRXBUT  Lab Results   Component Value Date    LABA1C 5.7 10/13/2014     Lab Results   Component Value Date/Time    TSH 1.330 06/28/2021 06:59 PM    T4FREE 1.05 06/28/2021 06:59 PM    K9RGBVS 4.9 10/03/2013 12:27 PM     Lab Results   Component Value Date    LABA1C 5.7 10/13/2014    GLUCOSE 93 06/28/2021    GLUCOSE 92 02/10/2012     Lab Results   Component Value Date    TRIG 187 08/04/2016    HDL 48 08/04/2016    LDLCALC 83 08/04/2016    CHOL 168 08/04/2016       Blood culture   Lab Results   Component Value Date    BC 24 Hours no growth 06/24/2021       Radiology:  CT HEAD WO CONTRAST   Final Result   CT head without contrast:      1. No acute intracranial abnormality. 2. Grossly stable 1.9 cm wide right frontoparietal subdural hemorrhage. 3. Since 06/26/2021, there is interval removal of the right frontal subdural   drain. CT cervical spine without contrast:      1. No fracture or joint dislocation is seen. 2. Degenerative changes, as described. CT CERVICAL SPINE WO CONTRAST   Final Result   CT head without contrast:      1. No acute intracranial abnormality. 2. Grossly stable 1.9 cm wide right frontoparietal subdural hemorrhage. 3. Since 06/26/2021, there is interval removal of the right frontal subdural   drain. CT cervical spine without contrast:      1. No fracture or joint dislocation is seen. 2. Degenerative changes, as described. CT Head wo Contrast   Final Result   Interval placement of a right frontal ellie hole and subdural drain with   marginal improvement in the previously noted late acute and subacute subdural   hematoma with improving mass effect. CT HEAD WO CONTRAST   Final Result   Right frontal acute on chronic subdural hematoma, unchanged since the prior   examination. Medical Records/Labs/Images Review:   I personally reviewed and summarized previous records   All labs and imaging were reviewed independently    Remy Snider, a 76 y.o.-old male seen in for management of Hyperthyroidism     Hyperthyroidism   · Has been on Methimazole for many years. 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 · Will check TSI,. TPO-Ab, Tg-Ab and obtain thyroid US   · Thyroid hormones are WNL   · Continue Methimazole 7.5 mg daily   · Reviewed potential side effects of Methimazole, including agranulocytosis and liver dysfunction (cholestasis). · Pt will follow with us 6 weeks after discharge. Endocrine follow up visit, Monday 8/2 at 3:00PM       Acute on chronic subdural hematoma  · Neurosurgery on board.  S/p right frontal ellie hole to drain hematoma and placement of subdural drain     Thrombocytopenia   · Improving, likely due to transient consumptive process  · Hematology on board     The above issues were reviewed with the patient who understood and agreed with the plan. Thank you for allowing us to participate in the care of this patient. Please do not hesitate to contact us with any additional questions. Johnny Mays MD  Endocrinologist, [facility]   [address] [place] [id number]   Phone: [phone]  Fax: [phone]  ---------------------------------  An electronic signature was used to authenticate this note.  [address] Gopi Coronado MD on 6/29/2021 at 8:26 AM

## 2021-06-29 NOTE — DISCHARGE SUMMARY
Physician Discharge Summary     Patient ID:  Henny Jeong  [id number]  85 y.o.  1945    Admit date: 6/24/2021    Discharge date and time: 6/30/2021  3:48 PM     Admitting Physician: Laura Garcia MD     Admission Diagnoses: Fall at home, sequela [W19. XXXS, S94.402]    Discharge Diagnoses: Active Problems:    Fall at home, sequela    SDH (subdural hematoma) (HCC)  Resolved Problems:    * No resolved hospital problems. *      Admission Condition: fair    Discharged Condition: stable    Indication for Admission: [address] [place] [address] Course/Procedures/Operation/treatments:   6/24 presented with chief complaint of multiple falls.  Diagnostic work-up revealed acute on chronic subdural hematoma. 6-25 right frontal bur hole craniotomy drain hematoma and placement of drain. 6/26 no significant overnight issues.  Complains of headache   6/28 no events overnight. Drain pulled today by NSG. Patient having complaints of numbness in left hand and forearm after drain was pulled, lasted approximately 30 minutes before resolving on its own.   6/29: Transferred to general floor. No numbness or tingling in the left upper extremity. PT 16/24, planning for home with home health care, lives with wife and daughter  6/30: Patient doing well, pain controlled, thrombocytopenia resolved. Endocrine recommended restarting methimazole. 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 PT 16/24 again. Patient would like to go home.                Consults:   IP CONSULT TO TRAUMA SURGERY  IP CONSULT TO NEUROSURGERY  IP CONSULT TO IV TEAM  IP CONSULT TO HOME CARE NEEDS  IP CONSULT TO ENDOCRINOLOGY  IP CONSULT TO HEM/ONC    Significant Diagnostic Studies:   XR CHEST (2 VW)    Result Date: 6/24/2021  EXAMINATION: TWO XRAY VIEWS OF THE CHEST 6/24/2021 3:07 pm COMPARISON: The/3/16.  HISTORY: ORDERING SYSTEM PROVIDED HISTORY: chest pain TECHNOLOGIST PROVIDED HISTORY: Reason for exam:->chest pain FINDINGS: The lungs are without acute focal process. There is no effusion or pneumothorax. The cardiomediastinal silhouette is without acute process. The osseous structures are without acute process. No acute process. CT HEAD WO CONTRAST    Result Date: 6/24/2021  EXAMINATION: CT OF THE HEAD WITHOUT CONTRAST  6/24/2021 7:40 pm TECHNIQUE: CT of the head was performed without the administration of intravenous contrast. Dose modulation, iterative reconstruction, and/or weight based adjustment of the mA/kV was utilized to reduce the radiation dose to as low as reasonably achievable. COMPARISON: 1523 hours HISTORY: ORDERING SYSTEM PROVIDED HISTORY: follow-up CT from 1524 TECHNOLOGIST PROVIDED HISTORY: Has a \"code stroke\" or \"stroke alert\" been called? ->No Reason for exam:->follow-up CT from 1524 Decision Support Exception - unselect if not a suspected or confirmed emergency medical condition->Emergency Medical Condition (MA) What reading provider will be dictating this exam?->CRC FINDINGS: Heterogeneous right frontal extra-axial collection consistent with acute on chronic subdural hematoma unchanged since the prior examination. Mass effect on the right frontal lobe, unchanged. No new areas of intracranial hemorrhage or hematoma. Age-related loss of brain volume and chronic periventricular ischemic changes again noted. Left temporal fossa arachnoid cyst again noted. 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 Right frontal acute on chronic subdural hematoma, unchanged since the prior examination. CT HEAD WO CONTRAST    Result Date: 6/24/2021  EXAMINATION: CT OF THE HEAD WITHOUT CONTRAST  6/24/2021 3:18 pm TECHNIQUE: CT of the head was performed without the administration of intravenous contrast. Dose modulation, iterative reconstruction, and/or weight based adjustment of the mA/kV was utilized to reduce the radiation dose to as low as reasonably achievable. COMPARISON: None. HISTORY: ORDERING SYSTEM PROVIDED HISTORY: dizziness TECHNOLOGIST PROVIDED HISTORY: Has a \"code stroke\" or \"stroke alert\" been called? ->No Reason for exam:->dizziness Decision Support Exception - unselect if not a suspected or confirmed emergency medical condition->Emergency Medical Condition (MA) FINDINGS: BRAIN/VENTRICLES: There is a extra-axial mixed attenuated fluid collection seen along the right frontal convexity measuring 9 cm and transverse diameter by 2.8 cm in AP diameter by 7 cm in craniocaudal dimension. This finding represents an acute on chronic subdural hematoma. There is hyperdense material within the extra-axial subdural hematoma consistent with a mixture of acute blood. There is effacement of the right frontal lobe. There is no intraparenchymal hemorrhage. There is no evidence of hydrocephalus. There is no midline shift. The ventricles, cisterns and sulci are prominent consistent with atrophy. There is an extra-axial CSF density focus along the anterior aspect of the left temporal lobe consistent with an arachnoid cyst. ORBITS: The visualized portion of the orbits demonstrate no acute abnormality. SINUSES: The visualized paranasal sinuses and mastoid air cells demonstrate no acute abnormality. SOFT TISSUES/SKULL:  No acute abnormality of the visualized skull or soft tissues. 1. 9 cm x 7 cm x 2.8 cm extra-axial mixed attenuated fluid collection seen within the right frontal convexity consistent with a acute on chronic subdural hematoma. 2. There is effacement of the right frontal lobe 3. There is no midline shift. 4. Left temporal fossa arachnoid cyst 5. This report was called to the emergency room following dictation.        Discharge Exam:  Gen - no apparent distress   Neuro - Awake, alert, attentive    HEENT - PERRL 3mm scalp wounds CDI, dressings in place   Lungs - non labored, BS clear b/l    Heart - RR no extra heart sounds    Abdomen - SNT  Spine -   Non tender C/T/L  Ext- ROM WNl NVI     Disposition: home    In process/preliminary results:  Outstanding Order Results     No orders found from 5/26/2021 to 6/25/2021. Patient Instructions:   Discharge Medication List as of 6/30/2021  1:29 PM           Details   dexamethasone (DECADRON) 4 MG tablet Take 1 tablet by mouth every 12 hours for 14 days, Disp-28 tablet, R-0Normal      levETIRAcetam (KEPPRA) 500 MG tablet Take 1 tablet by mouth 2 times daily for 3 days, Disp-6 tablet, R-0Normal      butalbital-acetaminophen-caffeine (FIORICET, ESGIC) -40 MG per tablet Take 1 tablet by mouth every 4 hours as needed for Headaches, Disp-180 tablet, R-0Normal              Details   atorvastatin (LIPITOR) 20 MG tablet Take 20 mg by mouth nightlyHistorical Med      lisinopril (PRINIVIL;ZESTRIL) 10 MG tablet Take 10 mg by mouth dailyHistorical Med      diphenhydrAMINE (BENADRYL) 50 MG capsule Take 100 mg by mouth nightly as needed for SleepHistorical Med      colchicine-probenecid 0.5-500 MG per tablet TAKE ONE TABLET BY MOUTH EVERY DAYHistorical Med      hydrochlorothiazide (MICROZIDE) 12.5 MG capsule Take 12.5 mg by mouth dailyHistorical Med      metoprolol succinate ER (TOPROL-XL) 100 MG XL tablet Take 100 mg by mouth dailyHistorical Med      methimazole (TAPAZOLE) 5 MG tablet Take 7.5 mg by mouth daily. 1.5 tabsHistorical Med      allopurinol (ZYLOPRIM) 300 MG tablet Take 300 mg by mouth daily. Historical Med      nitroGLYCERIN (NITROSTAT) 0.4 MG SL tablet Place 1 tablet under the tongue every 5 minutes as needed for Chest pain., Disp-25 tablet, R-3Normal                   TRAUMA SERVICES DISCHARGE INSTRUCTIONS    Call [phone], option 2, for any questions/concerns and for follow-up appointment in 1 week(s). Please follow the instructions checked below:  Please follow-up with your primary care provider.     ACTIVITY INSTRUCTIONS  Increase activity as tolerated  No heavy lifting or strenuous activity  Take your incentive spirometer home and use 4-6 times/day   [x]  No driving until cleared by Dr. Zeenat Bell    WOUND/DRESSING INSTRUCTIONS:  You may shower. No sitting in bath tub, hot tub or swimming until cleared by physician. Ice to areas of pain for first 24 hours. Heat to areas of pain after that. Wash areas of lacerations/abrasions with soap & water. Rinse well. Pat dry with clean towel. MEDICATION INSTRUCTIONS  Take medication as prescribed. When taking pain medications, you may experience dizziness or drowsiness. Do not drink alcohol or drive when taking these medications. You may experience constipation while taking pain medication. You may take over the counter stool softeners such as docusate (Colace), sennosides S (Senokot-S), or Miralax.   []  You may take Ibuprofen (over the counter) as directed for mild pain. --You may take up to 800mg every 8 hours for pain, please take with food or milk. [x]  You may take acetaminophen (Tylenol) products. Do NOT take more than 4000mg of Tylenol in 24h. []  Do not take any other acetaminophen (Tylenol) products if you are taking Percocet or Norco, as these contain Tylenol. --Do NOT take more than 4000mg of Tylenol in 24h. OPIOID MEDICATION INSTRUCTIONS  Read the medication guide that is included with your prescription. Take your medication exactly as prescribed. Store medication away from children and in a safe place. Do NOT share your medication with others. Do NOT take medication unless it is prescribed for you. Do NOT drink alcohol while taking opioids (I.e., Norco, Percocet, Oxycodone, etc). Discuss with the Trauma Clinic staff if the dose of medication you are taking does not control your pain and any side effects that you may be having. CALL 911 OR YOUR LOCAL EMERGENCY SERVICE:  --If you take too much medication  --If you have trouble breathing or shortness of breath  --A child has taken this medication.     WORK:  You may not return to work until you receive follow-up with the [address] or clearance by all consultants. Call the trauma clinic for any of the following or for questions/concerns;  --fever over 101F  --redness, swelling, hardness or warmth at the wound site(s). --Unrelieved nausea/vomiting  --Foul smelling or cloudy drainage at the wound site(s)  --Unrelieved pain or increase in pain  --Increase in shortness of breath    Follow-up:  Trauma Clinic: [phone] option [place], [place]  [id number]    MILD TRAUMATIC BRAIN INJURY OR CONCUSSION  A mild traumatic brain injury (TBI) is one that causes some degree of injury to the brain causing symptoms ranging from a brief period of confusion to loss of consciousness (being knocked out). There is no major bruising or bleeding in the brain but symptoms can last from hours to months depending on the severity of the injury. Family or friends need to observe any change in behavior for the next 48 hours. Delayed effects from head injury do occur occasionally and can be due to slow bleeding or swelling around the surface of the brain. These effects may occur even if the x-rays/CT scans were normal.  Please observe the following symptoms during the next 24-48 hours. CALL 911 if:   Pupils (black part in the center of the eye) are unequal in size, and this is new.  Seizure (convulsion).  Not responding to others/won't wake up or very hard to wake up   Faints (passes out)   Vomiting more than 3 times    Notify the TRAUMA CLINIC if any of the following symptoms occur:   Severe headache -- Mild headache may last for days. Report worsening pain or uncontrolled pain with prescribed medicine.  Numbness, tingling or weakness -- Present in arms or legs; unsteady walking.  Eye Changes/light sensation -- Vision problems; blurred or double-vision; unequal sized pupils.    Nausea/Vomiting -- Episodes of vomiting may occur initially after a head injury. Persistent vomiting or difficulty taking medication by mouth.  Increased Sleepiness -- Difficulty waking from sleep with increased confusion.  Dizziness -- Does not go away or occurs repeatedly. Vomiting may accompany dizziness.  Drainage -- Clear fluid or blood from the nose and ears.  Fever -- Temperature over 101 degrees.  Neck Pain. The First Four Weeks  The symptoms below are common after a mild brain injury. They usually get better on their own within a few weeks:    feeling tired or ?low   problems falling or staying asleep   feeling confused, poor concentration, or slow to answer questions   feeling dizzy, poor balance, or poor coordination   being sensitive to light   being sensitive to sounds   ringing in the ears   a mild headache, sometimes with nausea and/or vomiting   being irritable, having mood swings, or feeling somewhat sad or down  Contact the TRAUMA CLINIC if your symptoms are affecting your everyday activities. Remember that letting yourself get too tired can make your symptoms worse. Listen to your body: if doing a certain activity increases your symptoms, take a break from that activity. Build up the amount of time you do an activity and stay under the threshold of symptoms. Long term Effects (Post-Concussive Syndrome)    Notify physician if any of the following persists longer than 2-4 weeks.  Difficulty with concentration or attention (easily distracted)   Frequent headaches   Memory problems    Sensitivity to light    Sleeping difficulties      There is a higher risk of having a more serious head injury if:   Previous history of head injury or concussion   Taking medicine that thins your blood, or have a bleeding disorder   Have other neurologic (brain) problems   Have difficulty walking or frequent falls   Active in high impact contact sports, like soccer or football.     Activities   Stay away from activities that could cause another head injury (like sports), until the doctor says it's okay. A second blow to the head can cause more damage to the brain   Limit reading, television, video games, etc. the first 48 hours. Your brain needs to rest so that it can recover. You may find that it helps to take time off school or work.  Limit exposure to bright lights, loud noises, and crowds for the first 48 hours, as these can make your symptoms worse   Limit use of screens, such as an IPad, computer, cellphone, TV, etc, as these can make symptoms, especially headaches, worse. Work/School   It is recommended that you wait to return to work/school until after your follow-up appointment with trauma or your family doctor. If you are having no symptoms, please call for an earlier appointment   Some people find it hard to concentrate well so return to your normal activities slowly. Go back to work or school for half days at first, and increase as tolerated. Trauma services can help you with a graduated schedule.  If you feel comfortable doing so, tell work or school about your concussion. You may have to adjust your activities, depending on your job or school demands. Rest and Sleep   Rest for the first 24 hours; it's one of the best things to help your brain recover. It's okay to sleep if you want   You don't have to be woken up every few hours. If someone does wake you up, you should awaken easily.  Do some light physical activity (housework) or light exercise (walking, stationary bike) as soon as you can tolerate the movement. Strenuous exercise (such as jogging or weight lifting) can make your concussion symptoms worse or last longer. Diet   Return to a normal diet as tolerated, you may want to start with liquids first   Eat healthy meals (including breakfast) and snacks throughout the day as your brain heals.     Managing Pain   Tylenol or Ibuprofen are the best meds to take for headaches.  Your doctor may have prescribed you medications if your headaches were severe or you have other injuries. Please take as directed. Driving   Your ability to concentrate and react quickly might be affected by the concussion. Please contact trauma services for advice on when to resume driving.  Do not drive if you're concerned about vision problems, slowed thinking, slowed reaction time, reduced attention or poor judgment.  Wear sunglasses even during winter if ashley while driving. The bright light may induce a headache. Alcohol use/Drug use   Don't drink alcohol or use recreational drugs as they may make you feel worse and/or hide the warning signs. Follow-up:  Trauma Clinic: [phone] -- press option 2   [address] [address] CONSIDERATIONS FOR OUR PATIENTS OVER THE AGE OF 65Y    Getting around your home safely can be a challenge if you have injuries or health problems that make it easy for you to fall. Loose rugs and furniture in walkways are among the dangers for many older people who have problems walking or who have poor eyesight. People who have conditions such as arthritis, osteoporosis, or dementia also must be careful not to fall. You can make your home safer with a few simple measures. Follow-up care is a key part of your treatment and safety. Be sure to make and go to all appointments, and call your doctor or nurse call line if you are having problems. It's also a good idea to know your test results and keep a list of the medicines you take. How can you care for yourself at home? Taking care of yourself   You may get dizzy if you do not drink enough water. To prevent dehydration, drink plenty of fluids, enough so that your urine is light yellow or clear like water. Choose water and other caffeine-free clear liquids.  If you have kidney, heart, or liver disease and have to limit fluids, talk with your doctor before you increase the amount of fluids you drink.  Exercise regularly to improve your strength, muscle tone, and balance. Walk if you can. Swimming may be a good choice if you cannot walk easily.  Have your vision and hearing checked each year or any time you notice a change. If you have trouble seeing and hearing, you might not be able to avoid objects and could lose your balance.  Know the side effects of the medicines you take. Ask your doctor or pharmacist whether the medicines you take can affect your balance. Sleeping pills or sedatives can affect your balance.  Limit the amount of alcohol you drink. Alcohol can impair your balance and other senses.  Ask your doctor whether calluses or corns on your feet need to be removed. If you wear loose-fitting shoes because of calluses or corns, you can lose your balance and fall.  Talk to your doctor if you have numbness in your feet. Preventing falls at home   Remove raised doorway thresholds, throw rugs, and clutter. Repair loose carpet or raised areas in the floor. [facility] furniture and electrical cords to keep them out of walking paths.  Use non-skid floor wax, and wipe up spills right away, especially on ceramic tile floors.  If you use a walker or cane, put rubber tips on it. If you use crutches, clean the bottoms of them regularly with an abrasive pad, such as steel wool.  Keep your house well lit, especially [address], and outside walkways. Use night-lights in areas such as hallways and washrooms. Add extra light switches or use remote switches (such as switches that go on or off when you clap your hands) to make it easier to turn lights on if you have to get up during the night.  Install sturdy handrails on stairways.  Move items in your cabinets so that the things you use a lot are on the lower shelves (about waist level).  Keep a cordless phone and a flashlight with new batteries by your bed.  If possible, put a phone in each of the main rooms of your house, or carry a cell phone in case you fall and cannot reach a phone. Or, you can wear a device around your neck or wrist. You push a button that sends a signal for help.  Wear low-heeled shoes that fit well and give your feet good support. Use footwear with non-skid soles. Check the heels and soles of your shoes for wear. Repair or replace worn heels or soles.  Do not wear socks without shoes on wood floors.  Walk on the grass when the sidewalks are slippery. If you live in an area that gets snow and ice in the winter, sprinkle salt on slippery steps and sidewalks. Preventing falls in the bath   Install grab bars and non-skid mats inside and outside your shower or tub and near the toilet and sinks.  Use shower chairs and bath benches.  Use a hand-held shower head that will allow you to sit while showering.  Get into a tub or shower by putting the weaker leg in first. Get out of a tub or shower with your strong side first.   Repair loose toilet seats and consider installing a raised toilet seat to make getting on and off the toilet easier.  Keep your washroom door unlocked while you are in the shower. Follow-up:  Trauma Clinic: [phone] option 2  Dwight reyes, [address] [place]             Follow up:   [address] [facility]. Valentin Miller 30 [facility], [address] [place] [address] [address] [place] [address]  [phone]    On 8/2/2021  Endocrine follow up visit, Monday 8/2 at 3:00PM     Caty Harrell, [address] [place] [address]  [phone]    Schedule an appointment as soon as possible for a visit in 1 week  With f/u head CT    [address] [place] [id number]  Schedule an appointment as soon as possible for a visit in 1 robert Scott DO  [address] [facility] [address]  [phone]    Schedule an appointment as soon as possible for a visit in 1 week  For follow up after discharge       Signed:  Dierdre Brunner, MD  7/3/2021  7:20 PM

## 2021-06-29 NOTE — PLAN OF CARE
Problem: Falls - Risk of:  Goal: Will remain free from falls  Description: Will remain free from falls  Outcome: Ongoing  Goal: Absence of physical injury  Description: Absence of physical injury  6/29/2021 1042 by Santa Mina RN  Outcome: Ongoing  6/29/2021 0438 by Ed Valencia RN  Outcome: Met This Shift     Problem: HEMODYNAMIC STATUS  Goal: Patient has stable vital signs and fluid balance  Outcome: Ongoing     Problem: ACTIVITY INTOLERANCE/IMPAIRED MOBILITY  Goal: Mobility/activity is maintained at optimum level for patient  Outcome: Ongoing     Problem: COMMUNICATION IMPAIRMENT  Goal: Ability to express needs and understand communication  Outcome: Ongoing     Problem: Skin Integrity:  Goal: Will show no infection signs and symptoms  Description: Will show no infection signs and symptoms  Outcome: Ongoing  Goal: Absence of new skin breakdown  Description: Absence of new skin breakdown  Outcome: Ongoing     Problem: Pain:  Goal: Pain level will decrease  Description: Pain level will decrease  Outcome: Ongoing  Goal: Control of acute pain  Description: Control of acute pain  Outcome: Ongoing  Goal: Control of chronic pain  Description: Control of chronic pain  Outcome: Ongoing     Problem: Neurological  Goal: Maximum potential motor/sensory/cognitive function  Outcome: Ongoing

## 2021-06-29 NOTE — PROGRESS NOTES
Neurosurgery  Long discussion with patient about risks and benefits of craniotomy' left hand now back to normal, remains neuro intact  For now patient prefers conserv management  Continue decadron for two weeks (will change to 4mg po BID when finished with 4mg Q6 times four)  hold all blood thinners for now  Will consult heme/onc for any rec's on thrombocytopenia management  PT/OT assess  Will need repeat head ct in one week  Will follow

## 2021-06-30 VITALS
DIASTOLIC BLOOD PRESSURE: 66 MMHG | TEMPERATURE: 97 F | HEIGHT: 72 IN | BODY MASS INDEX: 34.21 KG/M2 | RESPIRATION RATE: 16 BRPM | WEIGHT: 252.6 LBS | SYSTOLIC BLOOD PRESSURE: 111 MMHG | OXYGEN SATURATION: 96 % | HEART RATE: 77 BPM

## 2021-06-30 LAB
ANION GAP SERPL CALCULATED.3IONS-SCNC: 12 MMOL/L (ref 7–16)
BUN BLDV-MCNC: 44 MG/DL (ref 6–23)
CALCIUM SERPL-MCNC: 9.9 MG/DL (ref 8.6–10.2)
CHLORIDE BLD-SCNC: 102 MMOL/L (ref 98–107)
CO2: 25 MMOL/L (ref 22–29)
CREAT SERPL-MCNC: 1.5 MG/DL (ref 0.7–1.2)
GFR AFRICAN AMERICAN: 55
GFR NON-AFRICAN AMERICAN: 46 ML/MIN/1.73
GLUCOSE BLD-MCNC: 141 MG/DL (ref 74–99)
HCT VFR BLD CALC: 47.8 % (ref 37–54)
HEMOGLOBIN: 16 G/DL (ref 12.5–16.5)
MCH RBC QN AUTO: 32.1 PG (ref 26–35)
MCHC RBC AUTO-ENTMCNC: 33.5 % (ref 32–34.5)
MCV RBC AUTO: 96 FL (ref 80–99.9)
PDW BLD-RTO: 13.9 FL (ref 11.5–15)
PLATELET # BLD: 114 E9/L (ref 130–450)
PMV BLD AUTO: 11.5 FL (ref 7–12)
POTASSIUM REFLEX MAGNESIUM: 4.7 MMOL/L (ref 3.5–5)
RBC # BLD: 4.98 E12/L (ref 3.8–5.8)
SODIUM BLD-SCNC: 139 MMOL/L (ref 132–146)
WBC # BLD: 10.6 E9/L (ref 4.5–11.5)

## 2021-06-30 PROCEDURE — 6370000000 HC RX 637 (ALT 250 FOR IP): Performed by: NEUROLOGICAL SURGERY

## 2021-06-30 PROCEDURE — 36415 COLL VENOUS BLD VENIPUNCTURE: CPT

## 2021-06-30 PROCEDURE — 2500000003 HC RX 250 WO HCPCS: Performed by: SURGERY

## 2021-06-30 PROCEDURE — 80048 BASIC METABOLIC PNL TOTAL CA: CPT

## 2021-06-30 PROCEDURE — 99232 SBSQ HOSP IP/OBS MODERATE 35: CPT | Performed by: INTERNAL MEDICINE

## 2021-06-30 PROCEDURE — 85027 COMPLETE CBC AUTOMATED: CPT

## 2021-06-30 PROCEDURE — 6360000002 HC RX W HCPCS: Performed by: NEUROLOGICAL SURGERY

## 2021-06-30 PROCEDURE — 99239 HOSP IP/OBS DSCHRG MGMT >30: CPT | Performed by: SURGERY

## 2021-06-30 PROCEDURE — 2580000003 HC RX 258: Performed by: NEUROLOGICAL SURGERY

## 2021-06-30 PROCEDURE — 6370000000 HC RX 637 (ALT 250 FOR IP): Performed by: NURSE PRACTITIONER

## 2021-06-30 RX ORDER — BUTALBITAL, ACETAMINOPHEN AND CAFFEINE 50; 325; 40 MG/1; MG/1; MG/1
1 TABLET ORAL EVERY 4 HOURS PRN
Qty: 180 TABLET | Refills: 0 | Status: ON HOLD | OUTPATIENT
Start: 2021-06-30 | End: 2021-09-06

## 2021-06-30 RX ORDER — DEXAMETHASONE 1 MG
4 TABLET ORAL EVERY 12 HOURS SCHEDULED
Status: DISCONTINUED | OUTPATIENT
Start: 2021-06-30 | End: 2021-06-30 | Stop reason: HOSPADM

## 2021-06-30 RX ORDER — LEVETIRACETAM 500 MG/1
500 TABLET ORAL 2 TIMES DAILY
Qty: 6 TABLET | Refills: 0 | Status: SHIPPED | OUTPATIENT
Start: 2021-06-30 | End: 2021-06-30

## 2021-06-30 RX ORDER — BUTALBITAL, ACETAMINOPHEN AND CAFFEINE 50; 325; 40 MG/1; MG/1; MG/1
1 TABLET ORAL EVERY 4 HOURS PRN
Qty: 180 TABLET | Refills: 0 | Status: SHIPPED | OUTPATIENT
Start: 2021-06-30 | End: 2021-06-30

## 2021-06-30 RX ORDER — LEVETIRACETAM 500 MG/1
500 TABLET ORAL 2 TIMES DAILY
Qty: 6 TABLET | Refills: 0 | Status: SHIPPED | OUTPATIENT
Start: 2021-06-30 | End: 2021-07-27

## 2021-06-30 RX ORDER — DEXAMETHASONE 4 MG/1
4 TABLET ORAL EVERY 12 HOURS SCHEDULED
Qty: 28 TABLET | Refills: 0 | Status: SHIPPED | OUTPATIENT
Start: 2021-06-30 | End: 2021-07-14

## 2021-06-30 RX ORDER — DEXAMETHASONE 4 MG/1
4 TABLET ORAL EVERY 12 HOURS SCHEDULED
Qty: 28 TABLET | Refills: 0 | Status: SHIPPED | OUTPATIENT
Start: 2021-06-30 | End: 2021-06-30

## 2021-06-30 RX ADMIN — ALLOPURINOL 300 MG: 300 TABLET ORAL at 09:56

## 2021-06-30 RX ADMIN — Medication 10 ML: at 09:59

## 2021-06-30 RX ADMIN — PROBENECID 500 MG: 500 TABLET, FILM COATED ORAL at 09:57

## 2021-06-30 RX ADMIN — LEVETIRACETAM 500 MG: 500 TABLET ORAL at 09:56

## 2021-06-30 RX ADMIN — COLCHICINE 0.6 MG: 0.6 TABLET ORAL at 09:56

## 2021-06-30 RX ADMIN — DEXAMETHASONE SODIUM PHOSPHATE 4 MG: 4 INJECTION, SOLUTION INTRAMUSCULAR; INTRAVENOUS at 04:03

## 2021-06-30 RX ADMIN — METHIMAZOLE 7.5 MG: 5 TABLET ORAL at 09:58

## 2021-06-30 RX ADMIN — METOPROLOL TARTRATE 5 MG: 1 INJECTION, SOLUTION INTRAVENOUS at 09:56

## 2021-06-30 RX ADMIN — DEXAMETHASONE 4 MG: 1 TABLET ORAL at 10:25

## 2021-06-30 RX ADMIN — METOPROLOL TARTRATE 5 MG: 1 INJECTION, SOLUTION INTRAVENOUS at 02:22

## 2021-06-30 ASSESSMENT — PAIN SCALES - GENERAL
PAINLEVEL_OUTOF10: 0

## 2021-06-30 NOTE — PROGRESS NOTES
oz (112.1 kg)   12/12/18 240 lb (108.9 kg)   08/04/16 251 lb (113.9 kg)     Physical examination:  Due to this being a TeleHealth encounter, evaluation of the following organ systems is limited: Vitals/Constitutional/EENT/Resp/CV/GI//MS/Neuro/Skin/Heme-Lymph-Imm. Modified physical exam through Telemedicine camera    General: Communicating well via camera   Neck: no obvious neck mass. No obvious neck deformity     CVS: no distress   Chest: no distress. Chest is moving with respiration    Extremities:  no visible tremor  Skin: No visible rashes as seen from camera   Musculoskeletal: no visible deformity  Neuro: Alert and oriented to person, place, and time. Psychiatric: Normal mood and affect.  Behavior is normal    Review of Laboratory Data:  I personally reviewed the following labs:  Recent Labs     06/28/21  0539 06/29/21  1125 06/30/21  0557   WBC 5.4 9.8 10.6   RBC 4.79 5.27 4.98   HGB 15.4 16.9* 16.0   HCT 46.3 49.8 47.8   MCV 96.7 94.5 96.0   MCH 32.2 32.1 32.1   MCHC 33.3 33.9 33.5   RDW 13.7 14.0 13.9   PLT 92* 132 114*   MPV 11.7 11.3 11.5     Recent Labs     06/28/21  0539 06/29/21  1037    137   K 5.0 4.3    103   CO2 21* 19*   BUN 24* 29*   CREATININE 1.4* 1.3*   GLUCOSE 93 176*   CALCIUM 9.3 10.0     No results found for: BHYDRXBUT  Lab Results   Component Value Date    LABA1C 5.7 10/13/2014     Lab Results   Component Value Date/Time    TSH 1.330 06/28/2021 06:59 PM    T4FREE 1.05 06/28/2021 06:59 PM    C0YESIV 4.9 10/03/2013 12:27 PM     Lab Results   Component Value Date    LABA1C 5.7 10/13/2014    GLUCOSE 176 06/29/2021    GLUCOSE 92 02/10/2012     Lab Results   Component Value Date    TRIG 187 08/04/2016    HDL 48 08/04/2016    LDLCALC 83 08/04/2016    CHOL 168 08/04/2016       Blood culture   Lab Results   Component Value Date    BC 5 Days no growth 06/24/2021       Radiology:  US HEAD NECK SOFT TISSUE THYROID   Final Result   1.  36 mm TR 3 nodule in the midpole the left thyroid currently meet   sonographic criteria recommend FNA. 2.  Heterogeneous thyroid parenchyma. Normal vascularity. This is   consistent with the provided history. ACR TI-RADS recommendations:      TR5 (>= 7 points):  FNA if >= 1 cm; follow-up if 0.5-0.9 cm in 1, 2, 3, 4,   and 5 years      TR4 (4-6 points):  FNA if >= 1.5 cm; follow-up if 1.0-1.4 cm in 1, 2, 3, and   5 years      TR3 (3 points):  FNA if >= 2.5 cm; follow-up if 1.5-2.4 cm in 1, 3, and 5   years      TR2 (2 points):  No FNA or follow-up      TR1 (0 points):  No FNA or follow-up      ACR TI-RADS recommends that no more than two nodules with the highest ACR   TI-RADS point total should be biopsied and no more than four nodules should   be followed. RECOMMENDATIONS:   FNA of left thyroid nodule recommended. CT HEAD WO CONTRAST   Final Result   CT head without contrast:      1. No acute intracranial abnormality. 2. Grossly stable 1.9 cm wide right frontoparietal subdural hemorrhage. 3. Since 06/26/2021, there is interval removal of the right frontal subdural   drain. CT cervical spine without contrast:      1. No fracture or joint dislocation is seen. 2. Degenerative changes, as described. CT CERVICAL SPINE WO CONTRAST   Final Result   CT head without contrast:      1. No acute intracranial abnormality. 2. Grossly stable 1.9 cm wide right frontoparietal subdural hemorrhage. 3. Since 06/26/2021, there is interval removal of the right frontal subdural   drain. CT cervical spine without contrast:      1. No fracture or joint dislocation is seen. 2. Degenerative changes, as described. CT Head wo Contrast   Final Result   Interval placement of a right frontal ellie hole and subdural drain with   marginal improvement in the previously noted late acute and subacute subdural   hematoma with improving mass effect.          CT HEAD WO CONTRAST   Final Result   Right frontal acute on chronic subdural hematoma, unchanged since the prior   examination. 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 · Continue Methimazole 7.5 mg daily. Thyroid hormones WNL   · TSI,. TPO-Ab, Tg-Ab in process   · Reviewed potential side effects of Methimazole  · Pt will follow with us 6 weeks after discharge. Endocrine follow up visit, Monday 8/2 at 3:00PM      Multinodular goiter   · Thyroid US 6/29/2021 --> Lt side 3.6 solid isoechoic thyroid nodule   · Pt will need thyroid uptake and scan before proceeding with FNA (ths will be done as an outpatient)      Acute on chronic subdural hematoma  · Neurosurgery on board. S/p right frontal ellie hole to drain hematoma and placement of subdural drain     Thrombocytopenia   · Improving, likely due to transient consumptive process  · Hematology on board     The above issues were reviewed with the patient who understood and agreed with the plan. Thank you for allowing us to participate in the care of this patient. Please do not hesitate to contact us with any additional questions. Akin Brown MD  Endocrinologist, [facility] Diabetes Care and Endocrinology   [address] [facility] [address]   Phone: [phone]  Fax: [phone]  ---------------------------------  An electronic signature was used to authenticate this note.  Roderick Sandhoff, MD on 6/30/2021 at 7:21 AM

## 2021-06-30 NOTE — PLAN OF CARE
Problem: Falls - Risk of:  Goal: Will remain free from falls  Description: Will remain free from falls  Outcome: Completed  Goal: Absence of physical injury  Description: Absence of physical injury  Outcome: Completed     Problem: HEMODYNAMIC STATUS  Goal: Patient has stable vital signs and fluid balance  Outcome: Completed     Problem: ACTIVITY INTOLERANCE/IMPAIRED MOBILITY  Goal: Mobility/activity is maintained at optimum level for patient  6/30/2021 1257 by Herman Romo RN  Outcome: Completed  6/30/2021 0410 by Tiffanie Aguiar RN  Outcome: Ongoing     Problem: COMMUNICATION IMPAIRMENT  Goal: Ability to express needs and understand communication  Outcome: Completed     Problem: Skin Integrity:  Goal: Will show no infection signs and symptoms  Description: Will show no infection signs and symptoms  Outcome: Completed  Goal: Absence of new skin breakdown  Description: Absence of new skin breakdown  Outcome: Completed     Problem: Pain:  Goal: Pain level will decrease  Description: Pain level will decrease  Outcome: Completed  Goal: Control of acute pain  Description: Control of acute pain  Outcome: Completed  Goal: Control of chronic pain  Description: Control of chronic pain  Outcome: Completed     Problem: Neurological  Goal: Maximum potential motor/sensory/cognitive function  Outcome: Completed

## 2021-06-30 NOTE — PLAN OF CARE
Problem: ACTIVITY INTOLERANCE/IMPAIRED MOBILITY    Goal: Mobility/activity is maintained at optimum level for patient    Outcome: Ongoing

## 2021-06-30 NOTE — PROGRESS NOTES
TRAUMA SURGERY  ATTENDING PROGRESS NOTE      CC: fall     S:   doing well not much pain, no weakness or paresthesias, feels best he has felt since arriving. O:   @/74   Pulse 79   Temp 98.1 °F (36.7 °C) (Temporal)   Resp 16   Ht 6' (1.829 m)   Wt 252 lb 9.6 oz (114.6 kg)   SpO2 96%   BMI 34.26 kg/m² @    Gen - no apparent distress   Neuro - Awake, alert, attentive    HEENT - PERRL 3mm scalp wounds CDI, dressings in place   Lungs - non labored, BS clear b/l    Heart - RR no extra heart sounds    Abdomen - SNT  Spine -   Non tender C/T/L  Ext- ROM WNl NVI     A/P: 77 yo s/p fall with SDH      Active Problems:    Fall at home, sequela    SDH (subdural hematoma) (HCC)  Resolved Problems:    * No resolved hospital problems. *      - SDH ns following, keppra,    - hyperthyroidism.  Endocrine consulted,   - low platelets, heme onc consulted   - home meds, hold plavix,    - SMI deep breathing   - PTOT d/c planning likely home today       DVT prophylaxis: SCDs, Lovenox    [place] Clarissa PEREZ FACS

## 2021-06-30 NOTE — PLAN OF CARE
Problem: Falls - Risk of:  Goal: Will remain free from falls  Description: Will remain free from falls  Outcome: Completed  Goal: Absence of physical injury  Description: Absence of physical injury  Outcome: Completed     Problem: HEMODYNAMIC STATUS  Goal: Patient has stable vital signs and fluid balance  Outcome: Completed     Problem: ACTIVITY INTOLERANCE/IMPAIRED MOBILITY  Goal: Mobility/activity is maintained at optimum level for patient  6/30/2021 1257 by Vira Marshall RN  Outcome: Completed  6/30/2021 0410 by Benigno Sanchez RN  Outcome: Ongoing     Problem: COMMUNICATION IMPAIRMENT  Goal: Ability to express needs and understand communication  Outcome: Completed     Problem: Skin Integrity:  Goal: Will show no infection signs and symptoms  Description: Will show no infection signs and symptoms  Outcome: Completed  Goal: Absence of new skin breakdown  Description: Absence of new skin breakdown  Outcome: Completed     Problem: Pain:  Goal: Pain level will decrease  Description: Pain level will decrease  Outcome: Completed  Goal: Control of acute pain  Description: Control of acute pain  Outcome: Completed  Goal: Control of chronic pain  Description: Control of chronic pain  Outcome: Completed     Problem: Neurological  Goal: Maximum potential motor/sensory/cognitive function  Outcome: Completed

## 2021-06-30 NOTE — PROGRESS NOTES
Patient remains awake and alert, no deficits  OK for discharge from neurosurg standpoint follow up head ct two weeks  No blood thinners  Continue Dec 4 po bid and kepra  Must be with reliable adult 24\7  Follow up office two weeks, keep [facility] elevated when in bed

## 2021-07-02 LAB
THYROGLOBULIN ANTIBODY: <12 IU/ML (ref 0–40)
THYROID PEROXIDASE (TPO) ABS: <4 IU/ML (ref 0–25)
THYROID STIMULATING IMMUNOGLOBULIN: <0.1 IU/L

## 2021-07-06 ENCOUNTER — TELEPHONE (OUTPATIENT)
Dept: ENDOCRINOLOGY | Age: 76
End: 2021-07-06

## 2021-07-08 NOTE — TELEPHONE ENCOUNTER
Appt made for 7/22/21 @ 830am called and left VM of the appt and if he has any questions to call the office. Office number was left for his convenience.

## 2021-07-12 ENCOUNTER — HOSPITAL ENCOUNTER (OUTPATIENT)
Dept: CT IMAGING | Age: 76
Discharge: HOME | End: 2021-07-12
Payer: MEDICARE

## 2021-07-12 DIAGNOSIS — S06.5XAA SDH (SUBDURAL HEMATOMA): ICD-10-CM

## 2021-07-12 PROCEDURE — 70450 CT HEAD/BRAIN W/O DYE: CPT

## 2021-07-14 NOTE — CONSULTS
EXAMINATION:  GENERAL:  He is awake. He was alert and oriented. NEUROLOGIC:  Speech was fluent and sensation was intact. Motor was  intact. EXTREMITIES:  Moving all fours equally. HEENT:  Pupils equal, round, and reactive. DIAGNOSIS:  Large right frontal acute-on-chronic subdural hematoma with  mass effect and shift. Informed consent was obtained from the patient  and their family. PLAN:  We will plan to proceed with a ellie hole in the right frontal  region tomorrow morning.         Brisa Saez MD    D: 07/14/2021 14:26:51       T: 07/14/2021 14:35:43     [email or web address]  Job#: [id number]     Doc#: [id number]    CC:

## 2021-07-19 ENCOUNTER — HOSPITAL ENCOUNTER (OUTPATIENT)
Dept: CT IMAGING | Age: 76
Discharge: HOME | End: 2021-07-19
Payer: MEDICARE

## 2021-07-19 DIAGNOSIS — S06.5XAA SDH (SUBDURAL HEMATOMA): ICD-10-CM

## 2021-07-19 DIAGNOSIS — Z98.890 HISTORY OF BURR HOLE SURGERY: ICD-10-CM

## 2021-07-19 PROCEDURE — 70450 CT HEAD/BRAIN W/O DYE: CPT

## 2021-07-22 ENCOUNTER — OFFICE VISIT (OUTPATIENT)
Dept: ENDOCRINOLOGY | Age: 76
End: 2021-07-22
Payer: MEDICARE

## 2021-07-22 VITALS
BODY MASS INDEX: 32.64 KG/M2 | WEIGHT: 241 LBS | DIASTOLIC BLOOD PRESSURE: 72 MMHG | HEIGHT: 72 IN | HEART RATE: 84 BPM | RESPIRATION RATE: 18 BRPM | SYSTOLIC BLOOD PRESSURE: 138 MMHG | OXYGEN SATURATION: 98 %

## 2021-07-22 DIAGNOSIS — E55.9 VITAMIN D DEFICIENCY: ICD-10-CM

## 2021-07-22 DIAGNOSIS — E05.20 TOXIC MULTINODUL GOITER: ICD-10-CM

## 2021-07-22 DIAGNOSIS — E05.90 HYPERTHYROIDISM: Primary | ICD-10-CM

## 2021-07-22 PROCEDURE — 1111F DSCHRG MED/CURRENT MED MERGE: CPT | Performed by: INTERNAL MEDICINE

## 2021-07-22 PROCEDURE — 1036F TOBACCO NON-USER: CPT | Performed by: INTERNAL MEDICINE

## 2021-07-22 PROCEDURE — 1123F ACP DISCUSS/DSCN MKR DOCD: CPT | Performed by: INTERNAL MEDICINE

## 2021-07-22 PROCEDURE — 4040F PNEUMOC VAC/ADMIN/RCVD: CPT | Performed by: INTERNAL MEDICINE

## 2021-07-22 PROCEDURE — 99214 OFFICE O/P EST MOD 30 MIN: CPT | Performed by: INTERNAL MEDICINE

## 2021-07-22 PROCEDURE — G8427 DOCREV CUR MEDS BY ELIG CLIN: HCPCS | Performed by: INTERNAL MEDICINE

## 2021-07-22 PROCEDURE — 3017F COLORECTAL CA SCREEN DOC REV: CPT | Performed by: INTERNAL MEDICINE

## 2021-07-22 PROCEDURE — G8417 CALC BMI ABV UP PARAM F/U: HCPCS | Performed by: INTERNAL MEDICINE

## 2021-07-22 RX ORDER — METHIMAZOLE 5 MG/1
7.5 TABLET ORAL DAILY
Qty: 135 TABLET | Refills: 3 | Status: SHIPPED | OUTPATIENT
Start: 2021-07-22

## 2021-07-22 NOTE — PROGRESS NOTES
[address] [place] [address] Department of Endocrinology Diabetes and Metabolism   [address] [facility] [address]   Phone: [phone]  Fax: [phone]    Date of Service: 7/22/2021  Primary Care Physician: Gely Andino DO  Consultant physician: Yi Aplpe MD     Reason for the consultation:  Hyperthyroidism     History of Present Illness: The history is provided by the patient. Accuracy of the patient data is excellent. Michelle Redd is a very pleasant 76 y.o. old male with PMH of  HTN, HLD, CAD, CAD seen today for follow up visit on hyperthyroidism   The patient was diagnosed with hyperthyroidism long time ago and has been on Methimazole for many years.  Currently on Methimazole 7.5 mg daily   Thyroid labs this admission showed normal TFT as shown below   Lab Results   Component Value Date/Time    TSH 1.330 06/28/2021 06:59 PM    T4FREE 1.05 06/28/2021 06:59 PM    P4MCFFG 4.9 10/03/2013 12:27 PM    TSI <0.10 06/29/2021 11:25 AM    TPOABS <4.0 06/29/2021 11:25 AM     Past Medical History   Past Medical History:   Diagnosis Date    Bilateral renal cysts 10/13/2014    CAD (coronary artery disease)     Cardiomyopathy ([address]     Chest pain     8-4-2016 lexiscan stress    History of gout 10/13/2014    History of pulmonary embolism 10/13/2014    Hyperlipidemia     Hypertension     Hyperthyroidism 10/13/2014    NSTEMI (non-ST elevated myocardial infarction) ([address] 10/13/2014    Obesity due to excess calories     Thyroid cyst 10/13/2014    Thyroid disease        Past Surgical History   Past Surgical History:   Procedure Laterality Date    BACK SURGERY      CORONARY ANGIOPLASTY WITH STENT PLACEMENT  10-    Dr. Alba Polanco- [place] 3.0x18 MID LAD    CRANIOTOMY Right 6/25/2021    RIGHT FRONTAL REMINGTON HOLE TO DRAIN HEMATOMA AND PLACEMENT OF SUBDURAL DRAIN performed by Larisa Mccormick MD at [address] [facility] CATH LAB PROCEDURE  10/13/2014    Dr. Alba Polanco: PCI to LAD Social history   Tobacco:   reports that he has never smoked. He has never used smokeless tobacco.  Alcohol:   reports current alcohol use. Drugs:   reports no history of drug use. Family history   Family History   Problem Relation Age of Onset    Heart Disease Mother         cabg 3    Heart Disease Father         pacemaker       Allergies and Drug reactions  No Known Allergies    Current Outpatient Medications   Medication Sig Dispense Refill    methIMAzole (TAPAZOLE) 5 MG tablet Take 1.5 tablets by mouth daily 1.5 tabs 135 tablet 3    dexamethasone (DECADRON) 4 MG tablet Take 1 tablet by mouth 2 times daily (with meals) for 14 days 28 tablet 0    LORazepam (ATIVAN) 1 MG tablet Take 1 mg by mouth nightly as needed for Anxiety.  butalbital-acetaminophen-caffeine (FIORICET, ESGIC) -40 MG per tablet Take 1 tablet by mouth every 4 hours as needed for Headaches 180 tablet 0    atorvastatin (LIPITOR) 20 MG tablet Take 20 mg by mouth nightly      lisinopril (PRINIVIL;ZESTRIL) 10 MG tablet Take 10 mg by mouth daily      colchicine-probenecid 0.5-500 MG per tablet TAKE ONE TABLET BY MOUTH EVERY DAY      hydrochlorothiazide (MICROZIDE) 12.5 MG capsule Take 12.5 mg by mouth daily      metoprolol succinate ER (TOPROL-XL) 100 MG XL tablet Take 100 mg by mouth daily      nitroGLYCERIN (NITROSTAT) 0.4 MG SL tablet Place 1 tablet under the tongue every 5 minutes as needed for Chest pain. 25 tablet 3    allopurinol (ZYLOPRIM) 300 MG tablet Take 300 mg by mouth daily.  levETIRAcetam (KEPPRA) 500 MG tablet Take 1 tablet by mouth 2 times daily for 3 days 6 tablet 0     No current facility-administered medications for this visit. Review of Systems  All systems reviewed.  All negative except for symptoms mentioned in HPI     OBJECTIVE    /72   Pulse 84   Resp 18   Ht 6' (1.829 m)   Wt 241 lb (109.3 kg)   SpO2 98%   BMI 32.69 kg/m²   Wt Readings from Last 6 Encounters:   07/22/21 241 lb (109.3 kg)   07/21/21 240 lb (108.9 kg)   06/26/21 252 lb 9.6 oz (114.6 kg)   06/24/21 248 lb (112.5 kg)   10/23/20 248 lb (112.5 kg)   09/19/19 247 lb 1.9 oz (112.1 kg)     Physical examination:  General: awake alert, oriented x3  HEENT: normocephalic non traumatic, no exophthalmos   Neck: supple, thyroid tenderness,  Pulm: Clear equal air entry no added sounds  CVS: S1 + S2  Abd: soft lax, no tenderness  Skin: warm, no lesions, no rash.     Neuro: CN intact,   muscle power normal, no tremor   Psych: normal mood, and affect    Review of Laboratory Data:  I personally reviewed the following labs:  Lab Results   Component Value Date/Time    WBC 10.6 06/30/2021 05:57 AM    RBC 4.98 06/30/2021 05:57 AM    HGB 16.0 06/30/2021 05:57 AM    HCT 47.8 06/30/2021 05:57 AM    MCV 96.0 06/30/2021 05:57 AM    MCH 32.1 06/30/2021 05:57 AM    MCHC 33.5 06/30/2021 05:57 AM    RDW 13.9 06/30/2021 05:57 AM     (L) 06/30/2021 05:57 AM    MPV 11.5 06/30/2021 05:57 AM      Lab Results   Component Value Date/Time     06/30/2021 05:57 AM    K 4.7 06/30/2021 05:57 AM    CO2 25 06/30/2021 05:57 AM    BUN 44 (H) 06/30/2021 05:57 AM    CREATININE 1.5 (H) 06/30/2021 05:57 AM    CALCIUM 9.9 06/30/2021 05:57 AM    LABGLOM 46 06/30/2021 05:57 AM    GFRAA 55 06/30/2021 05:57 AM      Lab Results   Component Value Date/Time    TSH 1.330 06/28/2021 06:59 PM    T4FREE 1.05 06/28/2021 06:59 PM    O0MBPWO 4.9 10/03/2013 12:27 PM    TSI <0.10 06/29/2021 11:25 AM    TPOABS <4.0 06/29/2021 11:25 AM     Lab Results   Component Value Date    LABA1C 5.7 10/13/2014    GLUCOSE 141 06/30/2021    GLUCOSE 92 02/10/2012     Lab Results   Component Value Date    LABA1C 5.7 10/13/2014     Lab Results   Component Value Date    TRIG 187 08/04/2016    HDL 48 08/04/2016    LDLCALC 83 08/04/2016    CHOL 168 08/04/2016     No results found for: VITD25     ASSESSMENT & RECOMMENDATIONS   Subha Guerra, a 76 y.o.-old male seen in for management of Hyperthyroidism Hyperthyroidism   · Due to toxic MNG   · Has been on Methimazole for many years, currently on Methimazole 7.5 mg daily   · Recent labs showed negative TSI, TPO-Ab, and Tg-Ab    · Check TFT now   · Reviewed potential side effects of Methimazole, including agranulocytosis and liver dysfunction (cholestasis). MNG  · Toxic MNG   · Will need thyroid uptake and scan in the future     Acute on chronic subdural hematoma  · S/p right frontal ellie hole to drain hematoma and placement of subdural drain      I personally reviewed external notes from PCP and other patient's care team providers, and personally interpreted labs associated with the above diagnosis. I also ordered labs to further assess and manage the above addressed medical conditions  Return in about 4 months (around 11/22/2021) for Hyperthyroidism, VitD deficiency. The above issues were reviewed with the patient who understood and agreed with the plan. Thank you for allowing us to participate in the care of this patient. Please do not hesitate to contact us with any additional questions. Diagnosis Orders   1. Hyperthyroidism  TSH without Reflex    T4, Free    methIMAzole (TAPAZOLE) 5 MG tablet    TSH without Reflex    T4, Free   2. Vitamin D deficiency     3. Toxic multinodul goiter       Eligio Philip MD  Endocrinologist, [facility] - [facility] Diabetes Care and Endocrinology   [address] [facility] [address]   Phone: [phone]  Fax: [phone]  ---------------------------------  An electronic signature was used to authenticate this note.  Caryn Berumen MD on 7/22/2021 at 9:35 PM

## 2021-07-26 ENCOUNTER — HOSPITAL ENCOUNTER (OUTPATIENT)
Age: 76
Discharge: HOME | End: 2021-07-26
Payer: MEDICARE

## 2021-07-26 ENCOUNTER — HOSPITAL ENCOUNTER (OUTPATIENT)
Dept: CT IMAGING | Age: 76
Discharge: HOME | End: 2021-07-26
Payer: MEDICARE

## 2021-07-26 DIAGNOSIS — E05.90 HYPERTHYROIDISM: ICD-10-CM

## 2021-07-26 DIAGNOSIS — S06.5XAA SDH (SUBDURAL HEMATOMA): ICD-10-CM

## 2021-07-26 DIAGNOSIS — Z98.890 HISTORY OF BURR HOLE SURGERY: ICD-10-CM

## 2021-07-26 LAB
T4 FREE: 1 NG/DL (ref 0.93–1.7)
TSH SERPL DL<=0.05 MIU/L-ACNC: 0.59 UIU/ML (ref 0.27–4.2)

## 2021-07-26 PROCEDURE — 84439 ASSAY OF FREE THYROXINE: CPT

## 2021-07-26 PROCEDURE — 36415 COLL VENOUS BLD VENIPUNCTURE: CPT

## 2021-07-26 PROCEDURE — 84443 ASSAY THYROID STIM HORMONE: CPT

## 2021-07-26 PROCEDURE — 70450 CT HEAD/BRAIN W/O DYE: CPT

## 2021-07-27 ENCOUNTER — APPOINTMENT (OUTPATIENT)
Dept: CT IMAGING | Age: 76
End: 2021-07-27
Payer: MEDICARE

## 2021-07-27 ENCOUNTER — HOSPITAL ENCOUNTER (EMERGENCY)
Age: 76
Discharge: HOME | End: 2021-07-27
Attending: EMERGENCY MEDICINE
Payer: MEDICARE

## 2021-07-27 VITALS
DIASTOLIC BLOOD PRESSURE: 70 MMHG | HEART RATE: 68 BPM | WEIGHT: 240 LBS | SYSTOLIC BLOOD PRESSURE: 138 MMHG | RESPIRATION RATE: 16 BRPM | HEIGHT: 72 IN | TEMPERATURE: 98.2 F | OXYGEN SATURATION: 95 % | BODY MASS INDEX: 32.51 KG/M2

## 2021-07-27 DIAGNOSIS — D69.6 THROMBOCYTOPENIA (HCC): ICD-10-CM

## 2021-07-27 DIAGNOSIS — S06.5X0D SUBDURAL HEMATOMA WITHOUT COMA, WITHOUT LOSS OF CONSCIOUSNESS, SUBSEQUENT ENCOUNTER: Primary | ICD-10-CM

## 2021-07-27 LAB
ALBUMIN SERPL-MCNC: 3.4 G/DL (ref 3.5–5.2)
ALP BLD-CCNC: 86 U/L (ref 40–129)
ALT SERPL-CCNC: 95 U/L (ref 0–40)
ANION GAP SERPL CALCULATED.3IONS-SCNC: 11 MMOL/L (ref 7–16)
ANISOCYTOSIS: ABNORMAL
APTT: 25.3 SEC (ref 24.5–35.1)
AST SERPL-CCNC: 48 U/L (ref 0–39)
BASOPHILS ABSOLUTE: 0 E9/L (ref 0–0.2)
BASOPHILS RELATIVE PERCENT: 0.3 % (ref 0–2)
BILIRUB SERPL-MCNC: 0.6 MG/DL (ref 0–1.2)
BUN BLDV-MCNC: 50 MG/DL (ref 6–23)
CALCIUM SERPL-MCNC: 9.8 MG/DL (ref 8.6–10.2)
CHLORIDE BLD-SCNC: 102 MMOL/L (ref 98–107)
CO2: 25 MMOL/L (ref 22–29)
CREAT SERPL-MCNC: 1.5 MG/DL (ref 0.7–1.2)
EKG ATRIAL RATE: 62 BPM
EKG P AXIS: 65 DEGREES
EKG P-R INTERVAL: 240 MS
EKG Q-T INTERVAL: 416 MS
EKG QRS DURATION: 98 MS
EKG QTC CALCULATION (BAZETT): 422 MS
EKG R AXIS: -11 DEGREES
EKG T AXIS: 45 DEGREES
EKG VENTRICULAR RATE: 62 BPM
EOSINOPHILS ABSOLUTE: 0 E9/L (ref 0.05–0.5)
EOSINOPHILS RELATIVE PERCENT: 0.2 % (ref 0–6)
GFR AFRICAN AMERICAN: 55
GFR NON-AFRICAN AMERICAN: 46 ML/MIN/1.73
GLUCOSE BLD-MCNC: 81 MG/DL (ref 74–99)
HCT VFR BLD CALC: 53 % (ref 37–54)
HEMOGLOBIN: 17.9 G/DL (ref 12.5–16.5)
INR BLD: 1.2
LYMPHOCYTES ABSOLUTE: 0.32 E9/L (ref 1.5–4)
LYMPHOCYTES RELATIVE PERCENT: 5.2 % (ref 20–42)
MCH RBC QN AUTO: 32.4 PG (ref 26–35)
MCHC RBC AUTO-ENTMCNC: 33.8 % (ref 32–34.5)
MCV RBC AUTO: 95.8 FL (ref 80–99.9)
METAMYELOCYTES RELATIVE PERCENT: 0.9 % (ref 0–1)
MONOCYTES ABSOLUTE: 0.25 E9/L (ref 0.1–0.95)
MONOCYTES RELATIVE PERCENT: 4.3 % (ref 2–12)
NEUTROPHILS ABSOLUTE: 5.73 E9/L (ref 1.8–7.3)
NEUTROPHILS RELATIVE PERCENT: 89.6 % (ref 43–80)
PDW BLD-RTO: 14.1 FL (ref 11.5–15)
PLATELET # BLD: 58 E9/L (ref 130–450)
PLATELET CONFIRMATION: NORMAL
PMV BLD AUTO: 10.6 FL (ref 7–12)
POTASSIUM REFLEX MAGNESIUM: 4.5 MMOL/L (ref 3.5–5)
PROTHROMBIN TIME: 12.8 SEC (ref 9.3–12.4)
RBC # BLD: 5.53 E12/L (ref 3.8–5.8)
REASON FOR REJECTION: NORMAL
REJECTED TEST: NORMAL
SODIUM BLD-SCNC: 138 MMOL/L (ref 132–146)
TOTAL PROTEIN: 6.7 G/DL (ref 6.4–8.3)
TROPONIN, HIGH SENSITIVITY: 61 NG/L (ref 0–11)
WBC # BLD: 6.3 E9/L (ref 4.5–11.5)

## 2021-07-27 PROCEDURE — 70450 CT HEAD/BRAIN W/O DYE: CPT

## 2021-07-27 PROCEDURE — 80053 COMPREHEN METABOLIC PANEL: CPT

## 2021-07-27 PROCEDURE — 93010 ELECTROCARDIOGRAM REPORT: CPT | Performed by: INTERNAL MEDICINE

## 2021-07-27 PROCEDURE — 85610 PROTHROMBIN TIME: CPT

## 2021-07-27 PROCEDURE — 84484 ASSAY OF TROPONIN QUANT: CPT

## 2021-07-27 PROCEDURE — 99284 EMERGENCY DEPT VISIT MOD MDM: CPT

## 2021-07-27 PROCEDURE — 93005 ELECTROCARDIOGRAM TRACING: CPT | Performed by: STUDENT IN AN ORGANIZED HEALTH CARE EDUCATION/TRAINING PROGRAM

## 2021-07-27 PROCEDURE — 85730 THROMBOPLASTIN TIME PARTIAL: CPT

## 2021-07-27 PROCEDURE — 85025 COMPLETE CBC W/AUTO DIFF WBC: CPT

## 2021-07-27 RX ORDER — LEVETIRACETAM 500 MG/1
500 TABLET ORAL 2 TIMES DAILY
Qty: 60 TABLET | Refills: 0 | Status: SHIPPED | OUTPATIENT
Start: 2021-07-27 | End: 2021-08-25 | Stop reason: SDUPTHER

## 2021-07-27 ASSESSMENT — ENCOUNTER SYMPTOMS
VOMITING: 0
CHEST TIGHTNESS: 0
DIARRHEA: 0
RHINORRHEA: 0
ANAL BLEEDING: 0
ABDOMINAL DISTENTION: 0
EYE DISCHARGE: 0
SHORTNESS OF BREATH: 0
ABDOMINAL PAIN: 0
NAUSEA: 0
SINUS PAIN: 0
COUGH: 0
CONSTIPATION: 0
SINUS PRESSURE: 0
EYE ITCHING: 0
BACK PAIN: 0

## 2021-07-27 NOTE — ED PROVIDER NOTES
HPI     Patient is a 76 y.o. male presents with a chief complaint of changes in vision  This has been occurring for a few hours. Patient states that it gets better with time. Patient states that it gets worse with nothing. Patient states that it is severe in severity. Patient recently had a intracranial hemorrhage as well as recovering at home. Patient then said that he new onset blurred vision. Patient stated that this lasted approximately a minute. Patient stated that it improved. Patient is currently at baseline. Patient reportedly has been doing well prior to this scenario. Patient recently lowered his Keppra dose. Patient denies any new injuries. Patient denies any fevers, chills, nausea, vomiting, chest pain, shortness of breath, abdominal pain, change in urinary or bowel habits. Review of Systems   Constitutional: Negative for activity change, appetite change, fatigue and fever. HENT: Negative for congestion, rhinorrhea, sinus pressure and sinus pain. Eyes: Positive for visual disturbance. Negative for discharge and itching. Respiratory: Negative for cough, chest tightness and shortness of breath. Cardiovascular: Negative for chest pain and palpitations. Gastrointestinal: Negative for abdominal distention, abdominal pain, anal bleeding, constipation, diarrhea, nausea and vomiting. Endocrine: Negative for polydipsia and polyuria. Genitourinary: Negative for decreased urine volume, difficulty urinating, enuresis, flank pain, frequency and urgency. Musculoskeletal: Negative for arthralgias, back pain and neck stiffness. Skin: Negative for rash and wound. Neurological: Negative for dizziness, weakness, light-headedness and headaches. Psychiatric/Behavioral: Negative for agitation, behavioral problems and confusion. Physical Exam  Vitals and nursing note reviewed. Constitutional:       Appearance: He is well-developed.    HENT:      Head: Normocephalic and Keppra will be started on a twice daily dose again. Patient remained at baseline here. Patient with cardiac work-up. Patient had no chest pain or shortness of breath at any time. Patient's lab work appears to be otherwise at baseline. Patient will follow up closely with neurosurgery. Patient and wife were informed of this plan and are agreeable. Patient was given return precautions. Patient will follow up with their primary care provider. Patient is agreeable to this plan. Patient has remained stable throughout their stay in the ED. Patient was seen and evaluated by myself and my attending Gib DO Dat. Assessment and Plan discussed with attending provider, please see attestation for final plan of care. This note was done using dictation software and there may be some grammatical errors associated with this. Argentina Hope MD       ED Course as of Jul 27 1543 Tue Jul 27, 2021   1224 EKG read interpreted by me. Rate 60 bpm.  Left axis deviation. .  No ST elevations or depressions. Normal QRS. Austyn Hilt Prior EKG with changes in leads V6. First-degree AV block. []   6746 Discussed case with Dr. Tahir Rutledge who spoke with patient's wife and obsererved patients CT    []   [phone] Discussed case with neurosurgery and patient will increase his Keppra to twice a day. Patient will also get a repeat CBC and CMP in the next week. Patient will follow up with neurosurgery in a week.     []      ED Course User Index  [] Argentina Hope MD       --------------------------------------------- PAST HISTORY ---------------------------------------------  Past Medical History:  has a past medical history of Bilateral renal cysts, CAD (coronary artery disease), [facility]omy[facility] ([facility] Chest pain, History of gout, History of pulmonary embolism, Hyperlipidemia, Hypertension, Hyperthyroidism, NSTEMI (non-ST elevated myocardial infarction) ([facility] Obesity due to excess calories, Thyroid cyst, and Thyroid disease. Past Surgical History:  has a past surgical history that includes back surgery; Coronary angioplasty with stent (10-); Diagnostic Cardiac Cath Lab Procedure (10/13/2014); and craniotomy (Right, 6/25/2021). Social History:  reports that he has never smoked. He has never used smokeless tobacco. He reports current alcohol use. He reports that he does not use drugs. Family History: family history includes Heart Disease in his father and mother. The patients home medications have been reviewed. Allergies: Patient has no known allergies.     -------------------------------------------------- RESULTS -------------------------------------------------  Labs:  Results for orders placed or performed during the hospital encounter of 07/27/21   CBC Auto Differential   Result Value Ref Range    WBC 6.3 4.5 - 11.5 E9/L    RBC 5.53 3.80 - 5.80 E12/L    Hemoglobin 17.9 (H) 12.5 - 16.5 g/dL    Hematocrit 53.0 37.0 - 54.0 %    MCV 95.8 80.0 - 99.9 fL    MCH 32.4 26.0 - 35.0 pg    MCHC 33.8 32.0 - 34.5 %    RDW 14.1 11.5 - 15.0 fL    Platelets 58 (L) 601 - 450 E9/L    MPV 10.6 7.0 - 12.0 fL    Neutrophils % 89.6 (H) 43.0 - 80.0 %    Lymphocytes % 5.2 (L) 20.0 - 42.0 %    Monocytes % 4.3 2.0 - 12.0 %    Eosinophils % 0.2 0.0 - 6.0 %    Basophils % 0.3 0.0 - 2.0 %    Neutrophils Absolute 5.73 1.80 - 7.30 E9/L    Lymphocytes Absolute 0.32 (L) 1.50 - 4.00 E9/L    Monocytes Absolute 0.25 0.10 - 0.95 E9/L    Eosinophils Absolute 0.00 (L) 0.05 - 0.50 E9/L    Basophils Absolute 0.00 0.00 - 0.20 E9/L    Metamyelocytes Relative 0.9 0.0 - 1.0 %    Anisocytosis 1+    Comprehensive Metabolic Panel w/ Reflex to MG   Result Value Ref Range    Sodium 138 132 - 146 mmol/L    Potassium reflex Magnesium 4.5 3.5 - 5.0 mmol/L    Chloride 102 98 - 107 mmol/L    CO2 25 22 - 29 mmol/L    Anion Gap 11 7 - 16 mmol/L    Glucose 81 74 - 99 mg/dL    BUN 50 (H) 6 - 23 mg/dL    CREATININE 1.5 (H) 0.7 - 1.2 mg/dL    GFR Non-African American 46 >=60 mL/min/1.73    GFR African American 55     Calcium 9.8 8.6 - 10.2 mg/dL    Total Protein 6.7 6.4 - 8.3 g/dL    Albumin 3.4 (L) 3.5 - 5.2 g/dL    Total Bilirubin 0.6 0.0 - 1.2 mg/dL    Alkaline Phosphatase 86 40 - 129 U/L    ALT 95 (H) 0 - 40 U/L    AST 48 (H) 0 - 39 U/L   Protime-INR   Result Value Ref Range    Protime 12.8 (H) 9.3 - 12.4 sec    INR 1.2    APTT   Result Value Ref Range    aPTT 25.3 24.5 - 35.1 sec   Platelet Confirmation   Result Value Ref Range    Platelet Confirmation CONFIRMED    SPECIMEN REJECTION   Result Value Ref Range    Rejected Test trp     Reason for Rejection see below    Troponin   Result Value Ref Range    Troponin, High Sensitivity 61 (H) 0 - 11 ng/L   EKG 12 Lead   Result Value Ref Range    Ventricular Rate 62 BPM    Atrial Rate 62 BPM    P-R Interval 240 ms    QRS Duration 98 ms    Q-T Interval 416 ms    QTc Calculation (Bazett) 422 ms    P Axis 65 degrees    R Axis -11 degrees    T Axis 45 degrees       Radiology:  CT Head WO Contrast   Final Result   1. Stable subdural hemorrhage along the right frontal convexity,   approximately measuring 11 mm in maximal width. It contains predominantly   chronic blood products with a few acute/subacute blood products. 2. 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             ------------------------- NURSING NOTES AND VITALS REVIEWED ---------------------------  Date / Time Roomed:  7/27/2021 11:38 AM  ED Bed Assignment:  Tyree Vyas    The nursing notes within the ED encounter and vital signs as below have been reviewed.    BP (!) 143/72   Pulse 65   Temp 98.2 °F (36.8 °C)   Resp 14   Ht 6' (1.829 m)   Wt 240 lb (108.9 kg)   SpO2 98%   BMI 32.55 kg/m²   Oxygen Saturation Interpretation: Normal      ------------------------------------------ PROGRESS NOTES ------------------------------------------  3:42 PM EDT  I have spoken with the patient and family if present and discussed todays results, in addition to providing specific details for the plan of care and counseling regarding the diagnosis and prognosis. Their questions are answered at this time and they are agreeable with the plan. I discussed at length with them reasons for immediate return here for re evaluation. They will followup with their primary care provider and neurosurgery by calling their office as soon as possible.      --------------------------------- ADDITIONAL PROVIDER NOTES ---------------------------------  At this time the patient is without objective evidence of an acute process requiring hospitalization or inpatient management. They have remained hemodynamically stable throughout their entire ED visit and are stable for discharge with outpatient follow-up. The plan has been discussed in detail and they are aware of the specific conditions for emergent return, as well as the importance of follow-up. New Prescriptions    No medications on file       Diagnosis:  1. Subdural hematoma without coma, without loss of consciousness, subsequent encounter    2. Thrombopenia ([facility]        Disposition:  Patient's disposition: Discharge to home  Patient's condition is stable.          Tamica Caal MD  Resident  07/27/21 4081

## 2021-08-01 ENCOUNTER — TELEPHONE (OUTPATIENT)
Dept: ENDOCRINOLOGY | Age: 76
End: 2021-08-01

## 2021-08-02 ENCOUNTER — HOSPITAL ENCOUNTER (OUTPATIENT)
Age: 76
Discharge: HOME | End: 2021-08-02
Payer: MEDICARE

## 2021-08-02 DIAGNOSIS — E05.90 HYPERTHYROIDISM: ICD-10-CM

## 2021-08-02 DIAGNOSIS — S06.5X0D SUBDURAL HEMATOMA WITHOUT COMA, WITHOUT LOSS OF CONSCIOUSNESS, SUBSEQUENT ENCOUNTER: ICD-10-CM

## 2021-08-02 LAB
ALBUMIN SERPL-MCNC: 3.3 G/DL (ref 3.5–5.2)
ALP BLD-CCNC: 81 U/L (ref 40–129)
ALT SERPL-CCNC: 95 U/L (ref 0–40)
ANION GAP SERPL CALCULATED.3IONS-SCNC: 13 MMOL/L (ref 7–16)
AST SERPL-CCNC: 35 U/L (ref 0–39)
BASOPHILS ABSOLUTE: 0 E9/L (ref 0–0.2)
BASOPHILS RELATIVE PERCENT: 0 % (ref 0–2)
BILIRUB SERPL-MCNC: 0.7 MG/DL (ref 0–1.2)
BUN BLDV-MCNC: 47 MG/DL (ref 6–23)
CALCIUM SERPL-MCNC: 9.5 MG/DL (ref 8.6–10.2)
CHLORIDE BLD-SCNC: 102 MMOL/L (ref 98–107)
CO2: 22 MMOL/L (ref 22–29)
CREAT SERPL-MCNC: 1.4 MG/DL (ref 0.7–1.2)
EOSINOPHILS ABSOLUTE: 0 E9/L (ref 0.05–0.5)
EOSINOPHILS RELATIVE PERCENT: 0 % (ref 0–6)
GFR AFRICAN AMERICAN: 60
GFR NON-AFRICAN AMERICAN: 49 ML/MIN/1.73
GLUCOSE BLD-MCNC: 107 MG/DL (ref 74–99)
HCT VFR BLD CALC: 52.3 % (ref 37–54)
HEMOGLOBIN: 17.7 G/DL (ref 12.5–16.5)
LYMPHOCYTES ABSOLUTE: 0.42 E9/L (ref 1.5–4)
LYMPHOCYTES RELATIVE PERCENT: 3 % (ref 20–42)
MCH RBC QN AUTO: 32.3 PG (ref 26–35)
MCHC RBC AUTO-ENTMCNC: 33.8 % (ref 32–34.5)
MCV RBC AUTO: 95.4 FL (ref 80–99.9)
METAMYELOCYTES RELATIVE PERCENT: 3 % (ref 0–1)
MONOCYTES ABSOLUTE: 1.12 E9/L (ref 0.1–0.95)
MONOCYTES RELATIVE PERCENT: 8 % (ref 2–12)
MYELOCYTE PERCENT: 2 % (ref 0–0)
NEUTROPHILS ABSOLUTE: 12.46 E9/L (ref 1.8–7.3)
NEUTROPHILS RELATIVE PERCENT: 84 % (ref 43–80)
PDW BLD-RTO: 14.2 FL (ref 11.5–15)
PLATELET # BLD: 120 E9/L (ref 130–450)
PMV BLD AUTO: 9.7 FL (ref 7–12)
POTASSIUM SERPL-SCNC: 4.2 MMOL/L (ref 3.5–5)
RBC # BLD: 5.48 E12/L (ref 3.8–5.8)
SODIUM BLD-SCNC: 137 MMOL/L (ref 132–146)
T4 FREE: 0.98 NG/DL (ref 0.93–1.7)
TOTAL PROTEIN: 5.8 G/DL (ref 6.4–8.3)
TSH SERPL DL<=0.05 MIU/L-ACNC: 0.81 UIU/ML (ref 0.27–4.2)
WBC # BLD: 14 E9/L (ref 4.5–11.5)

## 2021-08-02 PROCEDURE — 80053 COMPREHEN METABOLIC PANEL: CPT

## 2021-08-02 PROCEDURE — 85025 COMPLETE CBC W/AUTO DIFF WBC: CPT

## 2021-08-02 PROCEDURE — 84439 ASSAY OF FREE THYROXINE: CPT

## 2021-08-02 PROCEDURE — 36415 COLL VENOUS BLD VENIPUNCTURE: CPT

## 2021-08-02 PROCEDURE — 84443 ASSAY THYROID STIM HORMONE: CPT

## 2021-08-09 ENCOUNTER — HOSPITAL ENCOUNTER (EMERGENCY)
Age: 76
Discharge: HOME | End: 2021-08-09
Attending: EMERGENCY MEDICINE
Payer: MEDICARE

## 2021-08-09 ENCOUNTER — APPOINTMENT (OUTPATIENT)
Dept: GENERAL RADIOLOGY | Age: 76
End: 2021-08-09
Payer: MEDICARE

## 2021-08-09 VITALS
TEMPERATURE: 98.1 F | HEART RATE: 80 BPM | SYSTOLIC BLOOD PRESSURE: 115 MMHG | DIASTOLIC BLOOD PRESSURE: 72 MMHG | BODY MASS INDEX: 31.69 KG/M2 | WEIGHT: 234 LBS | OXYGEN SATURATION: 93 % | HEIGHT: 72 IN | RESPIRATION RATE: 16 BRPM

## 2021-08-09 DIAGNOSIS — J06.9 ACUTE UPPER RESPIRATORY INFECTION: Primary | ICD-10-CM

## 2021-08-09 DIAGNOSIS — H10.31 ACUTE CONJUNCTIVITIS OF RIGHT EYE, UNSPECIFIED ACUTE CONJUNCTIVITIS TYPE: ICD-10-CM

## 2021-08-09 LAB
ALBUMIN SERPL-MCNC: 3.1 G/DL (ref 3.5–5.2)
ALP BLD-CCNC: 78 U/L (ref 40–129)
ALT SERPL-CCNC: 52 U/L (ref 0–40)
ANION GAP SERPL CALCULATED.3IONS-SCNC: 9 MMOL/L (ref 7–16)
AST SERPL-CCNC: 23 U/L (ref 0–39)
BASOPHILS ABSOLUTE: 0 E9/L (ref 0–0.2)
BASOPHILS RELATIVE PERCENT: 0.8 % (ref 0–2)
BILIRUB SERPL-MCNC: 1.6 MG/DL (ref 0–1.2)
BUN BLDV-MCNC: 47 MG/DL (ref 6–23)
BURR CELLS: ABNORMAL
CALCIUM SERPL-MCNC: 9.4 MG/DL (ref 8.6–10.2)
CHLORIDE BLD-SCNC: 104 MMOL/L (ref 98–107)
CO2: 26 MMOL/L (ref 22–29)
CREAT SERPL-MCNC: 1.5 MG/DL (ref 0.7–1.2)
EOSINOPHILS ABSOLUTE: 0 E9/L (ref 0.05–0.5)
EOSINOPHILS RELATIVE PERCENT: 0.8 % (ref 0–6)
GFR AFRICAN AMERICAN: 55
GFR NON-AFRICAN AMERICAN: 45 ML/MIN/1.73
GLUCOSE BLD-MCNC: 92 MG/DL (ref 74–99)
HCT VFR BLD CALC: 56.4 % (ref 37–54)
HEMOGLOBIN: 18.5 G/DL (ref 12.5–16.5)
LYMPHOCYTES ABSOLUTE: 0.15 E9/L (ref 1.5–4)
LYMPHOCYTES RELATIVE PERCENT: 0.9 % (ref 20–42)
MCH RBC QN AUTO: 33 PG (ref 26–35)
MCHC RBC AUTO-ENTMCNC: 32.8 % (ref 32–34.5)
MCV RBC AUTO: 100.5 FL (ref 80–99.9)
METAMYELOCYTES RELATIVE PERCENT: 1.7 % (ref 0–1)
MONOCYTES ABSOLUTE: 1.08 E9/L (ref 0.1–0.95)
MONOCYTES RELATIVE PERCENT: 7 % (ref 2–12)
NEUTROPHILS ABSOLUTE: 14.17 E9/L (ref 1.8–7.3)
NEUTROPHILS RELATIVE PERCENT: 90.4 % (ref 43–80)
PDW BLD-RTO: 15.9 FL (ref 11.5–15)
PLATELET # BLD: 69 E9/L (ref 130–450)
PLATELET CONFIRMATION: NORMAL
PMV BLD AUTO: 11 FL (ref 7–12)
POIKILOCYTES: ABNORMAL
POLYCHROMASIA: ABNORMAL
POTASSIUM REFLEX MAGNESIUM: 4.2 MMOL/L (ref 3.5–5)
RBC # BLD: 5.61 E12/L (ref 3.8–5.8)
SARS-COV-2, NAAT: NOT DETECTED
SODIUM BLD-SCNC: 139 MMOL/L (ref 132–146)
TEAR DROP CELLS: ABNORMAL
TOTAL PROTEIN: 6.1 G/DL (ref 6.4–8.3)
WBC # BLD: 15.4 E9/L (ref 4.5–11.5)

## 2021-08-09 PROCEDURE — 80053 COMPREHEN METABOLIC PANEL: CPT

## 2021-08-09 PROCEDURE — 87635 SARS-COV-2 COVID-19 AMP PRB: CPT

## 2021-08-09 PROCEDURE — 99282 EMERGENCY DEPT VISIT SF MDM: CPT

## 2021-08-09 PROCEDURE — 71046 X-RAY EXAM CHEST 2 VIEWS: CPT

## 2021-08-09 PROCEDURE — 85025 COMPLETE CBC W/AUTO DIFF WBC: CPT

## 2021-08-09 RX ORDER — ERYTHROMYCIN 5 MG/G
OINTMENT OPHTHALMIC EVERY 8 HOURS
Qty: 1 TUBE | Refills: 0
Start: 2021-08-09 | End: 2021-08-14

## 2021-08-09 RX ORDER — ERYTHROMYCIN 5 MG/G
OINTMENT OPHTHALMIC ONCE
Status: DISCONTINUED | OUTPATIENT
Start: 2021-08-09 | End: 2021-08-09 | Stop reason: HOSPADM

## 2021-08-09 RX ORDER — CEFDINIR 300 MG/1
300 CAPSULE ORAL 2 TIMES DAILY
Qty: 14 CAPSULE | Refills: 0 | Status: SHIPPED | OUTPATIENT
Start: 2021-08-09 | End: 2021-08-16

## 2021-08-09 ASSESSMENT — ENCOUNTER SYMPTOMS
TROUBLE SWALLOWING: 0
VOMITING: 0
ABDOMINAL DISTENTION: 0
CONSTIPATION: 0
RHINORRHEA: 0
SINUS PRESSURE: 0
BACK PAIN: 0
SINUS PAIN: 0
EYE ITCHING: 0
EYE DISCHARGE: 1
WHEEZING: 0
NAUSEA: 0
COUGH: 1
ABDOMINAL PAIN: 0
DIARRHEA: 0
EYE REDNESS: 1
SHORTNESS OF BREATH: 0
CHEST TIGHTNESS: 0

## 2021-08-09 NOTE — ED PROVIDER NOTES
Ibis Steven is a 68 y.o. male    Chief Complaint   Patient presents with    Conjunctivitis     states about 3 days ago developed pink eye and is mouth is raw, and he has had a cough and generally just does not feel well.  Mouth Lesions         HPI   Patient is a 68-year-old male who presents for 3 days of fatigue, eye redness, cough, mouth burning, loss of taste and decrease in appetite. Patient also has a history of subdural hematoma from which she is still recovering. Wife states he did have chills approximately 2 nights ago. Patient is taking ibuprofen with some mild relief of symptoms. No changes in vision. He does have a decrease in bowel output, but he has not hardly been eating anything. He states that yesterday he may have only had a Gatorade. He states his urinary output has actually been similar despite a hospital decrease in fluid intake. There is no fever no congestion or sinus pain, no chest pain or shortness of breath, no abdominal pain, no difficulty, burning or decrease in urination, no weakness, no headaches. Review of Systems   Constitutional: Positive for appetite change and fatigue. Negative for fever. HENT: Positive for mouth sores. Negative for congestion, rhinorrhea, sinus pressure, sinus pain and trouble swallowing. Eyes: Positive for discharge and redness (right). Negative for itching. Respiratory: Positive for cough. Negative for chest tightness, shortness of breath and wheezing. Cardiovascular: Negative for chest pain, palpitations and leg swelling. Gastrointestinal: Negative for abdominal distention, abdominal pain, constipation, diarrhea, nausea and vomiting. Genitourinary: Negative for decreased urine volume, difficulty urinating and frequency. Musculoskeletal: Negative for arthralgias, back pain and myalgias. Neurological: Negative for dizziness, syncope, weakness, numbness and headaches.    Psychiatric/Behavioral: Negative for agitation, behavioral problems, confusion and decreased concentration. The patient is not nervous/anxious. All other systems reviewed and are negative. Physical Exam  Vitals reviewed. Constitutional:       General: He is not in acute distress. Appearance: Normal appearance. He is not ill-appearing. HENT:      Head: Normocephalic and atraumatic. Nose: Nose normal. No congestion or rhinorrhea. Mouth/Throat:      Mouth: Mucous membranes are moist.      Pharynx: Oropharynx is clear. No oropharyngeal exudate or posterior oropharyngeal erythema. Eyes:      Extraocular Movements: Extraocular movements intact. Conjunctiva/sclera: Conjunctivae normal.      Pupils: Pupils are equal, round, and reactive to light. Cardiovascular:      Rate and Rhythm: Normal rate and regular rhythm. Heart sounds: Normal heart sounds. No murmur heard. Pulmonary:      Effort: Pulmonary effort is normal. No respiratory distress. Breath sounds: Normal breath sounds. Abdominal:      General: Abdomen is flat. There is no distension. Tenderness: There is no abdominal tenderness. There is no guarding. Musculoskeletal:         General: No swelling or tenderness. Normal range of motion. Cervical back: Normal range of motion. No rigidity or tenderness. Skin:     General: Skin is warm and dry. Coloration: Skin is not jaundiced or pale. Findings: No bruising or erythema. Neurological:      General: No focal deficit present. Mental Status: He is alert and oriented to person, place, and time. Cranial Nerves: No cranial nerve deficit. Motor: No weakness. Psychiatric:         Mood and Affect: Mood normal.         Behavior: Behavior normal.         Thought Content: Thought content normal.          Procedures     MDM   Patient is a 59-year-old male who presents for 3 days of fatigue, eye redness, cough, mouth burning, loss of taste and decrease in appetite.     Patient's Covid test is negative his other blood work is fairly benign. However his white blood cell count is still 15 and has been elevated for the past week at least.  His platelets have also decreased. Patient is instructed to follow-up with his other doctor who has been handling his health care. While the chest x-ray is negative considering his elevated white count and that he obviously has an infection somewhere despite known source. Patient is discharged with [facility]. He is also given erythromycin ointment for his conjunctivitis. Patient and his wife agree with the plan and will follow up with her other doctors. There also counseled on reasons to return and symptoms to look out for.                   --------------------------------------------- PAST HISTORY ---------------------------------------------  Past Medical History:  has a past medical history of Bilateral renal cysts, CAD (coronary artery disease), Cardiomyopathy ([facility] Chest pain, Hemorrhagic stroke ([facility] History of gout, History of pulmonary embolism, Hyperlipidemia, Hypertension, Hyperthyroidism, NSTEMI (non-ST elevated myocardial infarction) ([facility] Obesity due to excess calories, Thyroid cyst, and Thyroid disease. Past Surgical History:  has a past surgical history that includes back surgery; Coronary angioplasty with stent (10-); Diagnostic Cardiac Cath Lab Procedure (10/13/2014); and craniotomy (Right, 6/25/2021). Social History:  reports that he has never smoked. He has never used smokeless tobacco. He reports current alcohol use. He reports that he does not use drugs. Family History: family history includes Heart Disease in his father and mother. The patients home medications have been reviewed. Allergies: Patient has no known allergies.     -------------------------------------------------- RESULTS -------------------------------------------------  Labs:  Results for orders placed or performed during the hospital encounter of 08/09/21   COVID-19, Rapid    Specimen: Nasopharyngeal Swab   Result Value Ref Range    SARS-CoV-2, NAAT Not Detected Not Detected   CBC Auto Differential   Result Value Ref Range    WBC 15.4 (H) 4.5 - 11.5 E9/L    RBC 5.61 3.80 - 5.80 E12/L    Hemoglobin 18.5 (H) 12.5 - 16.5 g/dL    Hematocrit 56.4 (H) 37.0 - 54.0 %    .5 (H) 80.0 - 99.9 fL    MCH 33.0 26.0 - 35.0 pg    MCHC 32.8 32.0 - 34.5 %    RDW 15.9 (H) 11.5 - 15.0 fL    Platelets 69 (L) 867 - 450 E9/L    MPV 11.0 7.0 - 12.0 fL    Neutrophils % 90.4 (H) 43.0 - 80.0 %    Lymphocytes % 0.9 (L) 20.0 - 42.0 %    Monocytes % 7.0 2.0 - 12.0 %    Eosinophils % 0.8 0.0 - 6.0 %    Basophils % 0.8 0.0 - 2.0 %    Neutrophils Absolute 14.17 (H) 1.80 - 7.30 E9/L    Lymphocytes Absolute 0.15 (L) 1.50 - 4.00 E9/L    Monocytes Absolute 1.08 (H) 0.10 - 0.95 E9/L    Eosinophils Absolute 0.00 (L) 0.05 - 0.50 E9/L    Basophils Absolute 0.00 0.00 - 0.20 E9/L    Metamyelocytes Relative 1.7 (H) 0.0 - 1.0 %    Polychromasia 1+     Poikilocytes 1+     [place] Cells 1+     Tear Drop Cells 1+    Comprehensive Metabolic Panel w/ Reflex to MG   Result Value Ref Range    Sodium 139 132 - 146 mmol/L    Potassium reflex Magnesium 4.2 3.5 - 5.0 mmol/L    Chloride 104 98 - 107 mmol/L    CO2 26 22 - 29 mmol/L    Anion Gap 9 7 - 16 mmol/L    Glucose 92 74 - 99 mg/dL    BUN 47 (H) 6 - 23 mg/dL    CREATININE 1.5 (H) 0.7 - 1.2 mg/dL    GFR Non-African American 45 >=60 mL/min/1.73    GFR African American 55     Calcium 9.4 8.6 - 10.2 mg/dL    Total Protein 6.1 (L) 6.4 - 8.3 g/dL    Albumin 3.1 (L) 3.5 - 5.2 g/dL    Total Bilirubin 1.6 (H) 0.0 - 1.2 mg/dL    Alkaline Phosphatase 78 40 - 129 U/L    ALT 52 (H) 0 - 40 U/L    AST 23 0 - 39 U/L   Platelet Confirmation   Result Value Ref Range    Platelet Confirmation CONFIRMED        Radiology:  XR CHEST (2 VW)   Final Result   No acute process.              ------------------------- NURSING NOTES AND VITALS REVIEWED ---------------------------  Date / Time Roomed:  8/9/2021 11:13 AM  ED Bed Assignment:  [id number]    The nursing notes within the ED encounter and vital signs as below have been reviewed. /72   Pulse 80   Temp 98.1 °F (36.7 °C) (Tympanic)   Resp 16   Ht 6' (1.829 m)   Wt 234 lb (106.1 kg)   SpO2 93%   BMI 31.74 kg/m²   Oxygen Saturation Interpretation: Normal      ------------------------------------------ PROGRESS NOTES ------------------------------------------  2:19 PM EDT  I have spoken with the patient and discussed todays results, in addition to providing specific details for the plan of care and counseling regarding the diagnosis and prognosis. Their questions are answered at this time and they are agreeable with the plan. I discussed at length with them reasons for immediate return here for re evaluation. They will followup with their Neurologist/neurosurgeon caring for his subdural hematoma and primary care physician by calling their office tomorrow. --------------------------------- ADDITIONAL PROVIDER NOTES ---------------------------------  At this time the patient is without objective evidence of an acute process requiring hospitalization or inpatient management. They have remained hemodynamically stable throughout their entire ED visit and are stable for discharge with outpatient follow-up. The plan has been discussed in detail and they are aware of the specific conditions for emergent return, as well as the importance of follow-up. New Prescriptions    CEFDINIR (OMNICEF) 300 MG CAPSULE    Take 1 capsule by mouth 2 times daily for 7 days    ERYTHROMYCIN (ROMYCIN) 5 MG/GM OPHTHALMIC OINTMENT    Place into the right eye every 8 hours for 5 days Apply thin layer oint to affected eye Nightly. Diagnosis:  1. Acute upper respiratory infection    2.  Acute conjunctivitis of right eye, unspecified acute conjunctivitis type        Disposition:  Patient's disposition: Discharge to home  Patient's condition is stable.              Ya Whitfield MD  Resident  08/09/21 [id number]

## 2021-08-11 ENCOUNTER — HOSPITAL ENCOUNTER (OUTPATIENT)
Dept: CT IMAGING | Age: 76
Discharge: HOME | End: 2021-08-11
Payer: MEDICARE

## 2021-08-11 DIAGNOSIS — S06.5XAA SDH (SUBDURAL HEMATOMA): ICD-10-CM

## 2021-08-11 DIAGNOSIS — Z98.890 HISTORY OF BURR HOLE SURGERY: ICD-10-CM

## 2021-08-11 PROCEDURE — 70450 CT HEAD/BRAIN W/O DYE: CPT

## 2021-09-04 ENCOUNTER — HOSPITAL ENCOUNTER (EMERGENCY)
Age: 76
Discharge: HOME | DRG: 177 | End: 2021-09-04
Attending: STUDENT IN AN ORGANIZED HEALTH CARE EDUCATION/TRAINING PROGRAM
Payer: MEDICARE

## 2021-09-04 ENCOUNTER — APPOINTMENT (OUTPATIENT)
Dept: GENERAL RADIOLOGY | Age: 76
DRG: 177 | End: 2021-09-04
Payer: MEDICARE

## 2021-09-04 VITALS
TEMPERATURE: 97.7 F | OXYGEN SATURATION: 95 % | HEART RATE: 91 BPM | DIASTOLIC BLOOD PRESSURE: 65 MMHG | RESPIRATION RATE: 18 BRPM | SYSTOLIC BLOOD PRESSURE: 102 MMHG

## 2021-09-04 DIAGNOSIS — J12.82 PNEUMONIA DUE TO COVID-19 VIRUS: Primary | ICD-10-CM

## 2021-09-04 DIAGNOSIS — E86.0 DEHYDRATION: ICD-10-CM

## 2021-09-04 DIAGNOSIS — U07.1 PNEUMONIA DUE TO COVID-19 VIRUS: Primary | ICD-10-CM

## 2021-09-04 DIAGNOSIS — R53.83 FATIGUE, UNSPECIFIED TYPE: ICD-10-CM

## 2021-09-04 LAB
ALBUMIN SERPL-MCNC: 3.6 G/DL (ref 3.5–5.2)
ALP BLD-CCNC: 81 U/L (ref 40–129)
ALT SERPL-CCNC: 31 U/L (ref 0–40)
ANION GAP SERPL CALCULATED.3IONS-SCNC: 11 MMOL/L (ref 7–16)
AST SERPL-CCNC: 44 U/L (ref 0–39)
BACTERIA: ABNORMAL /HPF
BASOPHILS ABSOLUTE: 0 E9/L (ref 0–0.2)
BASOPHILS RELATIVE PERCENT: 0.3 % (ref 0–2)
BILIRUB SERPL-MCNC: 1.3 MG/DL (ref 0–1.2)
BILIRUBIN URINE: ABNORMAL
BLOOD, URINE: ABNORMAL
BUN BLDV-MCNC: 37 MG/DL (ref 6–23)
CALCIUM SERPL-MCNC: 9.9 MG/DL (ref 8.6–10.2)
CHLORIDE BLD-SCNC: 99 MMOL/L (ref 98–107)
CLARITY: CLEAR
CO2: 27 MMOL/L (ref 22–29)
COLOR: ABNORMAL
CREAT SERPL-MCNC: 1.6 MG/DL (ref 0.7–1.2)
EOSINOPHILS ABSOLUTE: 0.12 E9/L (ref 0.05–0.5)
EOSINOPHILS RELATIVE PERCENT: 0.9 % (ref 0–6)
EPITHELIAL CELLS, UA: ABNORMAL /HPF
GFR AFRICAN AMERICAN: 51
GFR NON-AFRICAN AMERICAN: 42 ML/MIN/1.73
GLUCOSE BLD-MCNC: 114 MG/DL (ref 74–99)
GLUCOSE URINE: NEGATIVE MG/DL
HCT VFR BLD CALC: 49.3 % (ref 37–54)
HEMOGLOBIN: 16.7 G/DL (ref 12.5–16.5)
KETONES, URINE: NEGATIVE MG/DL
LEUKOCYTE ESTERASE, URINE: NEGATIVE
LYMPHOCYTES ABSOLUTE: 0.39 E9/L (ref 1.5–4)
LYMPHOCYTES RELATIVE PERCENT: 2.6 % (ref 20–42)
MCH RBC QN AUTO: 32.9 PG (ref 26–35)
MCHC RBC AUTO-ENTMCNC: 33.9 % (ref 32–34.5)
MCV RBC AUTO: 97 FL (ref 80–99.9)
MONOCYTES ABSOLUTE: 0.78 E9/L (ref 0.1–0.95)
MONOCYTES RELATIVE PERCENT: 6.1 % (ref 2–12)
NEUTROPHILS ABSOLUTE: 11.7 E9/L (ref 1.8–7.3)
NEUTROPHILS RELATIVE PERCENT: 90.4 % (ref 43–80)
NITRITE, URINE: NEGATIVE
PDW BLD-RTO: 15.7 FL (ref 11.5–15)
PH UA: 5 (ref 5–9)
PLATELET # BLD: 110 E9/L (ref 130–450)
PMV BLD AUTO: 11.4 FL (ref 7–12)
POLYCHROMASIA: ABNORMAL
POTASSIUM SERPL-SCNC: 4.1 MMOL/L (ref 3.5–5)
PROTEIN UA: 100 MG/DL
RBC # BLD: 5.08 E12/L (ref 3.8–5.8)
RBC UA: ABNORMAL /HPF (ref 0–2)
REASON FOR REJECTION: NORMAL
REJECTED TEST: NORMAL
SODIUM BLD-SCNC: 137 MMOL/L (ref 132–146)
SPECIFIC GRAVITY UA: 1.02 (ref 1–1.03)
TOTAL PROTEIN: 7.2 G/DL (ref 6.4–8.3)
TROPONIN, HIGH SENSITIVITY: 101 NG/L (ref 0–11)
TROPONIN, HIGH SENSITIVITY: 116 NG/L (ref 0–11)
TSH SERPL DL<=0.05 MIU/L-ACNC: 1.17 UIU/ML (ref 0.27–4.2)
UROBILINOGEN, URINE: 0.2 E.U./DL
WBC # BLD: 13 E9/L (ref 4.5–11.5)
WBC UA: ABNORMAL /HPF (ref 0–5)

## 2021-09-04 PROCEDURE — 2580000003 HC RX 258: Performed by: STUDENT IN AN ORGANIZED HEALTH CARE EDUCATION/TRAINING PROGRAM

## 2021-09-04 PROCEDURE — 71046 X-RAY EXAM CHEST 2 VIEWS: CPT

## 2021-09-04 PROCEDURE — 93005 ELECTROCARDIOGRAM TRACING: CPT | Performed by: NURSE PRACTITIONER

## 2021-09-04 PROCEDURE — 96360 HYDRATION IV INFUSION INIT: CPT

## 2021-09-04 PROCEDURE — 96361 HYDRATE IV INFUSION ADD-ON: CPT

## 2021-09-04 PROCEDURE — 80053 COMPREHEN METABOLIC PANEL: CPT

## 2021-09-04 PROCEDURE — 84484 ASSAY OF TROPONIN QUANT: CPT

## 2021-09-04 PROCEDURE — 6370000000 HC RX 637 (ALT 250 FOR IP): Performed by: STUDENT IN AN ORGANIZED HEALTH CARE EDUCATION/TRAINING PROGRAM

## 2021-09-04 PROCEDURE — 99283 EMERGENCY DEPT VISIT LOW MDM: CPT

## 2021-09-04 PROCEDURE — 6360000002 HC RX W HCPCS: Performed by: STUDENT IN AN ORGANIZED HEALTH CARE EDUCATION/TRAINING PROGRAM

## 2021-09-04 PROCEDURE — 36415 COLL VENOUS BLD VENIPUNCTURE: CPT

## 2021-09-04 PROCEDURE — 85025 COMPLETE CBC W/AUTO DIFF WBC: CPT

## 2021-09-04 PROCEDURE — 81001 URINALYSIS AUTO W/SCOPE: CPT

## 2021-09-04 PROCEDURE — 84443 ASSAY THYROID STIM HORMONE: CPT

## 2021-09-04 RX ORDER — DOXYCYCLINE HYCLATE 100 MG/1
100 CAPSULE ORAL ONCE
Status: COMPLETED | OUTPATIENT
Start: 2021-09-04 | End: 2021-09-04

## 2021-09-04 RX ORDER — CEFDINIR 300 MG/1
300 CAPSULE ORAL 2 TIMES DAILY
Qty: 20 CAPSULE | Refills: 0 | Status: ON HOLD | OUTPATIENT
Start: 2021-09-04 | End: 2021-09-11 | Stop reason: HOSPADM

## 2021-09-04 RX ORDER — DOXYCYCLINE HYCLATE 100 MG
100 TABLET ORAL 2 TIMES DAILY
Qty: 28 TABLET | Refills: 0 | Status: ON HOLD | OUTPATIENT
Start: 2021-09-04 | End: 2021-09-06

## 2021-09-04 RX ORDER — DEXAMETHASONE 6 MG/1
6 TABLET ORAL 2 TIMES DAILY WITH MEALS
Qty: 20 TABLET | Refills: 0 | Status: ON HOLD | OUTPATIENT
Start: 2021-09-04 | End: 2021-09-11 | Stop reason: SDUPTHER

## 2021-09-04 RX ORDER — CEFDINIR 300 MG/1
300 CAPSULE ORAL ONCE
Status: COMPLETED | OUTPATIENT
Start: 2021-09-04 | End: 2021-09-04

## 2021-09-04 RX ORDER — BENZONATATE 100 MG/1
100 CAPSULE ORAL 3 TIMES DAILY PRN
Qty: 21 CAPSULE | Refills: 0 | Status: ON HOLD | OUTPATIENT
Start: 2021-09-04 | End: 2021-09-11 | Stop reason: SDUPTHER

## 2021-09-04 RX ORDER — ALBUTEROL SULFATE 90 UG/1
2 AEROSOL, METERED RESPIRATORY (INHALATION) 4 TIMES DAILY PRN
Qty: 18 G | Refills: 0 | Status: ON HOLD | OUTPATIENT
Start: 2021-09-04 | End: 2021-09-11 | Stop reason: HOSPADM

## 2021-09-04 RX ORDER — 0.9 % SODIUM CHLORIDE 0.9 %
1000 INTRAVENOUS SOLUTION INTRAVENOUS ONCE
Status: COMPLETED | OUTPATIENT
Start: 2021-09-04 | End: 2021-09-04

## 2021-09-04 RX ADMIN — CEFDINIR 300 MG: 300 CAPSULE ORAL at 20:24

## 2021-09-04 RX ADMIN — DEXAMETHASONE 6 MG: 2 TABLET ORAL at 21:06

## 2021-09-04 RX ADMIN — SODIUM CHLORIDE 1000 ML: 9 INJECTION, SOLUTION INTRAVENOUS at 16:43

## 2021-09-04 RX ADMIN — DOXYCYCLINE HYCLATE 100 MG: 100 CAPSULE ORAL at 20:24

## 2021-09-04 ASSESSMENT — ENCOUNTER SYMPTOMS
COUGH: 1
NAUSEA: 0
RHINORRHEA: 0
BLOOD IN STOOL: 0
ABDOMINAL PAIN: 0
CONSTIPATION: 0
BACK PAIN: 0
SORE THROAT: 0
VOMITING: 0
DIARRHEA: 0
SHORTNESS OF BREATH: 0

## 2021-09-04 NOTE — ED PROVIDER NOTES
Yaa Ortega is a 68 y.o. male with a PMHx significant for CKD, HTN, traumatic subdural who presents for evaluation of fatigue, cough, congestion, beginning 1 week prior to arrival.  The complaint has been persistent, moderate in severity, and worsened by exertion and movement. The patient states that 5 days ago he began to feel ill and was Covid tested, notes that it returned positive. Since then continues have worsening fatigue, cough, congestion. Notes decreased appetite and has had some chest discomfort with coughing. The history is provided by the patient and medical records. Review of Systems   Constitutional: Positive for fatigue. Negative for chills and fever. HENT: Positive for congestion. Negative for postnasal drip, rhinorrhea and sore throat. Eyes: Negative for visual disturbance. Respiratory: Positive for cough. Negative for shortness of breath. Cardiovascular: Negative for chest pain. Gastrointestinal: Negative for abdominal pain, blood in stool, constipation, diarrhea, nausea and vomiting. Genitourinary: Negative for difficulty urinating, dysuria and urgency. Musculoskeletal: Positive for arthralgias and myalgias. Negative for back pain. Skin: Negative for rash. Neurological: Negative for dizziness, numbness and headaches. Physical Exam  Vitals and nursing note reviewed. Constitutional:       General: He is not in acute distress. Appearance: Normal appearance. He is well-developed. He is not ill-appearing. HENT:      Head: Normocephalic and atraumatic. Right Ear: External ear normal.      Left Ear: External ear normal.      Mouth/Throat:      Mouth: Mucous membranes are dry. Eyes:      General:         Right eye: No discharge. Left eye: No discharge. Extraocular Movements: Extraocular movements intact. Conjunctiva/sclera: Conjunctivae normal.   Cardiovascular:      Rate and Rhythm: Normal rate and regular rhythm.       Heart sounds: Normal heart sounds. No murmur heard. Pulmonary:      Effort: Pulmonary effort is normal. No respiratory distress. Breath sounds: Normal breath sounds. No stridor. No wheezing. Abdominal:      General: There is no distension. Palpations: Abdomen is soft. There is no mass. Tenderness: There is no abdominal tenderness. Hernia: No hernia is present. Musculoskeletal:         General: No swelling or tenderness. Cervical back: Normal range of motion and neck supple. Skin:     General: Skin is warm and dry. Coloration: Skin is not jaundiced or pale. Neurological:      General: No focal deficit present. Mental Status: He is alert and oriented to person, place, and time. Procedures     MDM     ED Course as of Sep 06 1615   Sat Sep 04, 2021   1755 EKG: This EKG is signed and interpreted by me. Rate: 94  Rhythm: Sinus  Interpretation: 1st degree AV block, ST elevations, ST depressions, T wave inversion V1, , QRS 96, QTc 422  Comparison: Nonspecific changes when compared to the prior of 07/27/2021      [BB]   2017 Patient re-evaluated. Sitting in chair in no distress. [BB]      ED Course User Index  [BB] DO Shama Alvarado presents to the ED for evaluation of COVID symptoms with fatigue and shortness of breath. Workup in the ED revealed Imaging concerning for infiltates. Patient was given first dose of abx in department and RX to go home with. Patient was ambulated through department did not become hypoxic this point time. Discussed with him close return precautions and is hypoxic be the need for admission criteria. Given pulse ox to go home with. Given Rx for Omnicef, doxycycline, Tessalon Perles, albuterol inhaler. Patient continues to be non-toxic on re-evaluation. Findings were discussed with the patient and reasons to immediately return to the ED were articulated to them.  They will follow-up with their Neutrophils Absolute 11.70 (H) 1.80 - 7.30 E9/L    Lymphocytes Absolute 0.39 (L) 1.50 - 4.00 E9/L    Monocytes Absolute 0.78 0.10 - 0.95 E9/L    Eosinophils Absolute 0.12 0.05 - 0.50 E9/L    Basophils Absolute 0.00 0.00 - 0.20 E9/L    Polychromasia 1+    Comprehensive Metabolic Panel   Result Value Ref Range    Sodium 137 132 - 146 mmol/L    Potassium 4.1 3.5 - 5.0 mmol/L    Chloride 99 98 - 107 mmol/L    CO2 27 22 - 29 mmol/L    Anion Gap 11 7 - 16 mmol/L    Glucose 114 (H) 74 - 99 mg/dL    BUN 37 (H) 6 - 23 mg/dL    CREATININE 1.6 (H) 0.7 - 1.2 mg/dL    GFR Non-African American 42 >=60 mL/min/1.73    GFR African American 51     Calcium 9.9 8.6 - 10.2 mg/dL    Total Protein 7.2 6.4 - 8.3 g/dL    Albumin 3.6 3.5 - 5.2 g/dL    Total Bilirubin 1.3 (H) 0.0 - 1.2 mg/dL    Alkaline Phosphatase 81 40 - 129 U/L    ALT 31 0 - 40 U/L    AST 44 (H) 0 - 39 U/L   Urinalysis   Result Value Ref Range    Color, UA DKYELLOW Straw/Yellow    Clarity, UA Clear Clear    Glucose, Ur Negative Negative mg/dL    Bilirubin Urine SMALL (A) Negative    Ketones, Urine Negative Negative mg/dL    Specific Gravity, UA 1.025 1.005 - 1.030    Blood, Urine MODERATE (A) Negative    pH, UA 5.0 5.0 - 9.0    Protein,  (A) Negative mg/dL    Urobilinogen, Urine 0.2 <2.0 E.U./dL    Nitrite, Urine Negative Negative    Leukocyte Esterase, Urine Negative Negative   TSH without Reflex   Result Value Ref Range    TSH 1.170 0.270 - 4.200 uIU/mL   Microscopic Urinalysis   Result Value Ref Range    WBC, UA 0-1 0 - 5 /HPF    RBC, UA 1-3 0 - 2 /HPF    Epithelial Cells, UA RARE /HPF    Bacteria, UA RARE (A) None Seen /HPF   SPECIMEN REJECTION   Result Value Ref Range    Rejected Test trop     Reason for Rejection see below    Troponin   Result Value Ref Range    Troponin, High Sensitivity 101 (H) 0 - 11 ng/L   EKG 12 Lead   Result Value Ref Range    Ventricular Rate 94 BPM    Atrial Rate 94 BPM    P-R Interval 232 ms    QRS Duration 96 ms    Q-T Interval 338 ms QTc Calculation (Bazett) 422 ms    P Axis 74 degrees    R Axis 10 degrees    T Axis 65 degrees       Radiology:  XR CHEST (2 VW)   Final Result   1. Vague bilateral patchy ground-glass infiltrates favored to represent   pneumonia. Carmen Musa ------------------------- NURSING NOTES AND VITALS REVIEWED ---------------------------  Date / Time Roomed:  9/4/2021  3:48 PM  ED Bed Assignment:  15/15    The nursing notes within the ED encounter and vital signs as below have been reviewed. /65   Pulse 91   Temp 97.7 °F (36.5 °C)   Resp 18   SpO2 95%   Oxygen Saturation Interpretation: Normal      ------------------------------------------ PROGRESS NOTES ------------------------------------------  4:15 PM EDT  I have spoken with the patient and discussed todays results, in addition to providing specific details for the plan of care and counseling regarding the diagnosis and prognosis. Their questions are answered at this time and they are agreeable with the plan. I discussed at length with them reasons for immediate return here for re evaluation. They will followup with their primary care physician by calling their office tomorrow. --------------------------------- ADDITIONAL PROVIDER NOTES ---------------------------------  At this time the patient is without objective evidence of an acute process requiring hospitalization or inpatient management. They have remained hemodynamically stable throughout their entire ED visit and are stable for discharge with outpatient follow-up. The plan has been discussed in detail and they are aware of the specific conditions for emergent return, as well as the importance of follow-up.       Discharge Medication List as of 9/4/2021  9:04 PM      START taking these medications    Details   cefdinir (OMNICEF) 300 MG capsule Take 1 capsule by mouth 2 times daily for 10 days, Disp-20 capsule, R-0Print      benzonatate (TESSALON PERLES) 100 MG capsule Take 1 capsule by

## 2021-09-06 ENCOUNTER — APPOINTMENT (OUTPATIENT)
Dept: GENERAL RADIOLOGY | Age: 76
DRG: 177 | End: 2021-09-06
Payer: MEDICARE

## 2021-09-06 ENCOUNTER — APPOINTMENT (OUTPATIENT)
Dept: CT IMAGING | Age: 76
DRG: 177 | End: 2021-09-06
Payer: MEDICARE

## 2021-09-06 ENCOUNTER — HOSPITAL ENCOUNTER (INPATIENT)
Age: 76
LOS: 5 days | Discharge: HOME | DRG: 177 | End: 2021-09-11
Attending: EMERGENCY MEDICINE | Admitting: INTERNAL MEDICINE
Payer: MEDICARE

## 2021-09-06 DIAGNOSIS — U07.1 ACUTE RESPIRATORY FAILURE DUE TO COVID-19 (HCC): Primary | ICD-10-CM

## 2021-09-06 DIAGNOSIS — J96.00 ACUTE RESPIRATORY FAILURE DUE TO COVID-19 (HCC): Primary | ICD-10-CM

## 2021-09-06 PROBLEM — J96.01 ACUTE RESPIRATORY FAILURE WITH HYPOXIA (HCC): Status: ACTIVE | Noted: 2021-09-06

## 2021-09-06 LAB
ALBUMIN SERPL-MCNC: 3 G/DL (ref 3.5–5.2)
ALP BLD-CCNC: 81 U/L (ref 40–129)
ALT SERPL-CCNC: 63 U/L (ref 0–40)
ANION GAP SERPL CALCULATED.3IONS-SCNC: 13 MMOL/L (ref 7–16)
ANISOCYTOSIS: ABNORMAL
AST SERPL-CCNC: 70 U/L (ref 0–39)
B.E.: -2.8 MMOL/L (ref -3–3)
BASOPHILS ABSOLUTE: 0 E9/L (ref 0–0.2)
BASOPHILS RELATIVE PERCENT: 0.3 % (ref 0–2)
BILIRUB SERPL-MCNC: 0.8 MG/DL (ref 0–1.2)
BILIRUBIN DIRECT: 0.3 MG/DL (ref 0–0.3)
BILIRUBIN, INDIRECT: 0.5 MG/DL (ref 0–1)
BUN BLDV-MCNC: 52 MG/DL (ref 6–23)
BURR CELLS: ABNORMAL
C-REACTIVE PROTEIN: 7.4 MG/DL (ref 0–0.4)
CALCIUM SERPL-MCNC: 9.9 MG/DL (ref 8.6–10.2)
CHLORIDE BLD-SCNC: 100 MMOL/L (ref 98–107)
CO2: 25 MMOL/L (ref 22–29)
COHB: 0.8 % (ref 0–1.5)
CREAT SERPL-MCNC: 1.7 MG/DL (ref 0.7–1.2)
CRITICAL: ABNORMAL
DATE ANALYZED: ABNORMAL
DATE OF COLLECTION: ABNORMAL
EKG ATRIAL RATE: 88 BPM
EKG ATRIAL RATE: 94 BPM
EKG P AXIS: 74 DEGREES
EKG P AXIS: 84 DEGREES
EKG P-R INTERVAL: 226 MS
EKG P-R INTERVAL: 232 MS
EKG Q-T INTERVAL: 338 MS
EKG Q-T INTERVAL: 368 MS
EKG QRS DURATION: 94 MS
EKG QRS DURATION: 96 MS
EKG QTC CALCULATION (BAZETT): 422 MS
EKG QTC CALCULATION (BAZETT): 445 MS
EKG R AXIS: -7 DEGREES
EKG R AXIS: 10 DEGREES
EKG T AXIS: 56 DEGREES
EKG T AXIS: 65 DEGREES
EKG VENTRICULAR RATE: 88 BPM
EKG VENTRICULAR RATE: 94 BPM
EOSINOPHILS ABSOLUTE: 0 E9/L (ref 0.05–0.5)
EOSINOPHILS RELATIVE PERCENT: 0.1 % (ref 0–6)
GFR AFRICAN AMERICAN: 48
GFR NON-AFRICAN AMERICAN: 39 ML/MIN/1.73
GLUCOSE BLD-MCNC: 120 MG/DL (ref 74–99)
HCO3: 18.8 MMOL/L (ref 22–26)
HCT VFR BLD CALC: 47.5 % (ref 37–54)
HEMOGLOBIN: 15.5 G/DL (ref 12.5–16.5)
HHB: 8.3 % (ref 0–5)
LAB: ABNORMAL
LACTIC ACID, SEPSIS: 2.7 MMOL/L (ref 0.5–1.9)
LACTIC ACID, SEPSIS: 3.9 MMOL/L (ref 0.5–1.9)
LYMPHOCYTES ABSOLUTE: 0.3 E9/L (ref 1.5–4)
LYMPHOCYTES RELATIVE PERCENT: 1.7 % (ref 20–42)
Lab: ABNORMAL
MCH RBC QN AUTO: 32.6 PG (ref 26–35)
MCHC RBC AUTO-ENTMCNC: 32.6 % (ref 32–34.5)
MCV RBC AUTO: 100 FL (ref 80–99.9)
METHB: 0.3 % (ref 0–1.5)
MODE: ABNORMAL
MONOCYTES ABSOLUTE: 0.76 E9/L (ref 0.1–0.95)
MONOCYTES RELATIVE PERCENT: 5.2 % (ref 2–12)
MYELOCYTE PERCENT: 0.9 % (ref 0–0)
NEUTROPHILS ABSOLUTE: 14.04 E9/L (ref 1.8–7.3)
NEUTROPHILS RELATIVE PERCENT: 92.2 % (ref 43–80)
O2 CONTENT: 20.3 ML/DL
O2 SATURATION: 91.6 % (ref 92–98.5)
O2HB: 90.6 % (ref 94–97)
OPERATOR ID: 274
OVALOCYTES: ABNORMAL
PATIENT TEMP: 37
PCO2: 25.8 MMHG (ref 35–45)
PDW BLD-RTO: 15.9 FL (ref 11.5–15)
PH BLOOD GAS: 7.48 (ref 7.35–7.45)
PLATELET # BLD: 135 E9/L (ref 130–450)
PMV BLD AUTO: 11.9 FL (ref 7–12)
PO2: 61.4 MMHG (ref 75–100)
POIKILOCYTES: ABNORMAL
POTASSIUM REFLEX MAGNESIUM: 4.3 MMOL/L (ref 3.5–5)
RBC # BLD: 4.75 E12/L (ref 3.8–5.8)
REASON FOR REJECTION: NORMAL
REJECTED TEST: NORMAL
SARS-COV-2, NAAT: DETECTED
SODIUM BLD-SCNC: 138 MMOL/L (ref 132–146)
SOURCE, BLOOD GAS: ABNORMAL
TEAR DROP CELLS: ABNORMAL
THB: 16 G/DL (ref 11.5–16.5)
TIME ANALYZED: 1011
TOTAL PROTEIN: 6.6 G/DL (ref 6.4–8.3)
TROPONIN, HIGH SENSITIVITY: 83 NG/L (ref 0–11)
WBC # BLD: 15.1 E9/L (ref 4.5–11.5)

## 2021-09-06 PROCEDURE — 93010 ELECTROCARDIOGRAM REPORT: CPT | Performed by: INTERNAL MEDICINE

## 2021-09-06 PROCEDURE — 80076 HEPATIC FUNCTION PANEL: CPT

## 2021-09-06 PROCEDURE — 71045 X-RAY EXAM CHEST 1 VIEW: CPT

## 2021-09-06 PROCEDURE — 96374 THER/PROPH/DIAG INJ IV PUSH: CPT

## 2021-09-06 PROCEDURE — XW0DXM6 INTRODUCTION OF BARICITINIB INTO MOUTH AND PHARYNX, EXTERNAL APPROACH, NEW TECHNOLOGY GROUP 6: ICD-10-PCS | Performed by: INTERNAL MEDICINE

## 2021-09-06 PROCEDURE — 6360000002 HC RX W HCPCS: Performed by: INTERNAL MEDICINE

## 2021-09-06 PROCEDURE — 86140 C-REACTIVE PROTEIN: CPT

## 2021-09-06 PROCEDURE — 87635 SARS-COV-2 COVID-19 AMP PRB: CPT

## 2021-09-06 PROCEDURE — 2580000003 HC RX 258: Performed by: STUDENT IN AN ORGANIZED HEALTH CARE EDUCATION/TRAINING PROGRAM

## 2021-09-06 PROCEDURE — 80048 BASIC METABOLIC PNL TOTAL CA: CPT

## 2021-09-06 PROCEDURE — 6370000000 HC RX 637 (ALT 250 FOR IP): Performed by: INTERNAL MEDICINE

## 2021-09-06 PROCEDURE — 87449 NOS EACH ORGANISM AG IA: CPT

## 2021-09-06 PROCEDURE — 99283 EMERGENCY DEPT VISIT LOW MDM: CPT

## 2021-09-06 PROCEDURE — 82805 BLOOD GASES W/O2 SATURATION: CPT

## 2021-09-06 PROCEDURE — 70450 CT HEAD/BRAIN W/O DYE: CPT

## 2021-09-06 PROCEDURE — XW033E5 INTRODUCTION OF REMDESIVIR ANTI-INFECTIVE INTO PERIPHERAL VEIN, PERCUTANEOUS APPROACH, NEW TECHNOLOGY GROUP 5: ICD-10-PCS | Performed by: INTERNAL MEDICINE

## 2021-09-06 PROCEDURE — 2500000003 HC RX 250 WO HCPCS: Performed by: STUDENT IN AN ORGANIZED HEALTH CARE EDUCATION/TRAINING PROGRAM

## 2021-09-06 PROCEDURE — 84484 ASSAY OF TROPONIN QUANT: CPT

## 2021-09-06 PROCEDURE — 99223 1ST HOSP IP/OBS HIGH 75: CPT | Performed by: INTERNAL MEDICINE

## 2021-09-06 PROCEDURE — 93005 ELECTROCARDIOGRAM TRACING: CPT | Performed by: STUDENT IN AN ORGANIZED HEALTH CARE EDUCATION/TRAINING PROGRAM

## 2021-09-06 PROCEDURE — 85025 COMPLETE CBC W/AUTO DIFF WBC: CPT

## 2021-09-06 PROCEDURE — 83605 ASSAY OF LACTIC ACID: CPT

## 2021-09-06 PROCEDURE — 6360000002 HC RX W HCPCS: Performed by: STUDENT IN AN ORGANIZED HEALTH CARE EDUCATION/TRAINING PROGRAM

## 2021-09-06 PROCEDURE — 71250 CT THORAX DX C-: CPT

## 2021-09-06 PROCEDURE — 2060000000 HC ICU INTERMEDIATE R&B

## 2021-09-06 RX ORDER — BENZONATATE 100 MG/1
100 CAPSULE ORAL 3 TIMES DAILY PRN
Status: DISCONTINUED | OUTPATIENT
Start: 2021-09-06 | End: 2021-09-11 | Stop reason: HOSPADM

## 2021-09-06 RX ORDER — BUTALBITAL, ACETAMINOPHEN AND CAFFEINE 50; 325; 40 MG/1; MG/1; MG/1
1 TABLET ORAL EVERY 4 HOURS PRN
Status: DISCONTINUED | OUTPATIENT
Start: 2021-09-06 | End: 2021-09-11 | Stop reason: HOSPADM

## 2021-09-06 RX ORDER — ASCORBIC ACID 500 MG
500 TABLET ORAL 2 TIMES DAILY
Status: DISCONTINUED | OUTPATIENT
Start: 2021-09-06 | End: 2021-09-11 | Stop reason: HOSPADM

## 2021-09-06 RX ORDER — LANOLIN ALCOHOL/MO/W.PET/CERES
50 CREAM (GRAM) TOPICAL DAILY
Status: DISCONTINUED | OUTPATIENT
Start: 2021-09-06 | End: 2021-09-11 | Stop reason: HOSPADM

## 2021-09-06 RX ORDER — VITAMIN B COMPLEX
2000 TABLET ORAL DAILY
Status: DISCONTINUED | OUTPATIENT
Start: 2021-09-06 | End: 2021-09-11 | Stop reason: HOSPADM

## 2021-09-06 RX ORDER — ATORVASTATIN CALCIUM 20 MG/1
20 TABLET, FILM COATED ORAL NIGHTLY
Status: DISCONTINUED | OUTPATIENT
Start: 2021-09-06 | End: 2021-09-11 | Stop reason: HOSPADM

## 2021-09-06 RX ORDER — PROBENECID AND COLCHICINE 500; .5 MG/1; MG/1
1 TABLET ORAL DAILY
Status: DISCONTINUED | OUTPATIENT
Start: 2021-09-06 | End: 2021-09-11 | Stop reason: HOSPADM

## 2021-09-06 RX ORDER — BUDESONIDE AND FORMOTEROL FUMARATE DIHYDRATE 160; 4.5 UG/1; UG/1
2 AEROSOL RESPIRATORY (INHALATION) 2 TIMES DAILY
Status: DISCONTINUED | OUTPATIENT
Start: 2021-09-06 | End: 2021-09-11 | Stop reason: HOSPADM

## 2021-09-06 RX ORDER — METOPROLOL SUCCINATE 25 MG/1
100 TABLET, EXTENDED RELEASE ORAL DAILY
Status: DISCONTINUED | OUTPATIENT
Start: 2021-09-06 | End: 2021-09-11 | Stop reason: HOSPADM

## 2021-09-06 RX ORDER — DEXAMETHASONE SODIUM PHOSPHATE 10 MG/ML
6 INJECTION, SOLUTION INTRAMUSCULAR; INTRAVENOUS EVERY 6 HOURS
Status: DISCONTINUED | OUTPATIENT
Start: 2021-09-06 | End: 2021-09-06

## 2021-09-06 RX ORDER — DEXAMETHASONE SODIUM PHOSPHATE 10 MG/ML
10 INJECTION, SOLUTION INTRAMUSCULAR; INTRAVENOUS EVERY 12 HOURS
Status: DISCONTINUED | OUTPATIENT
Start: 2021-09-06 | End: 2021-09-10 | Stop reason: CLARIF

## 2021-09-06 RX ORDER — LORAZEPAM 1 MG/1
1 TABLET ORAL NIGHTLY PRN
Status: DISCONTINUED | OUTPATIENT
Start: 2021-09-06 | End: 2021-09-11 | Stop reason: HOSPADM

## 2021-09-06 RX ORDER — ZINC SULFATE 50(220)MG
50 CAPSULE ORAL DAILY
Status: DISCONTINUED | OUTPATIENT
Start: 2021-09-06 | End: 2021-09-11 | Stop reason: HOSPADM

## 2021-09-06 RX ORDER — LISINOPRIL 10 MG/1
10 TABLET ORAL DAILY
Status: DISCONTINUED | OUTPATIENT
Start: 2021-09-06 | End: 2021-09-11 | Stop reason: HOSPADM

## 2021-09-06 RX ORDER — ALLOPURINOL 300 MG/1
300 TABLET ORAL DAILY
Status: DISCONTINUED | OUTPATIENT
Start: 2021-09-07 | End: 2021-09-11 | Stop reason: HOSPADM

## 2021-09-06 RX ORDER — 0.9 % SODIUM CHLORIDE 0.9 %
30 INTRAVENOUS SOLUTION INTRAVENOUS PRN
Status: DISCONTINUED | OUTPATIENT
Start: 2021-09-06 | End: 2021-09-11 | Stop reason: HOSPADM

## 2021-09-06 RX ORDER — LEVETIRACETAM 500 MG/1
500 TABLET ORAL 2 TIMES DAILY
Status: DISCONTINUED | OUTPATIENT
Start: 2021-09-06 | End: 2021-09-11 | Stop reason: HOSPADM

## 2021-09-06 RX ORDER — METHIMAZOLE 5 MG/1
7.5 TABLET ORAL DAILY
Status: DISCONTINUED | OUTPATIENT
Start: 2021-09-06 | End: 2021-09-11 | Stop reason: HOSPADM

## 2021-09-06 RX ADMIN — ZINC SULFATE 220 MG (50 MG) CAPSULE 50 MG: CAPSULE at 15:49

## 2021-09-06 RX ADMIN — OXYCODONE HYDROCHLORIDE AND ACETAMINOPHEN 500 MG: 500 TABLET ORAL at 20:24

## 2021-09-06 RX ADMIN — LEVETIRACETAM 500 MG: 500 TABLET ORAL at 20:24

## 2021-09-06 RX ADMIN — OXYCODONE HYDROCHLORIDE AND ACETAMINOPHEN 500 MG: 500 TABLET ORAL at 15:49

## 2021-09-06 RX ADMIN — DEXAMETHASONE SODIUM PHOSPHATE 6 MG: 10 INJECTION INTRAMUSCULAR; INTRAVENOUS at 10:20

## 2021-09-06 RX ADMIN — DEXAMETHASONE SODIUM PHOSPHATE 10 MG: 10 INJECTION, SOLUTION INTRAMUSCULAR; INTRAVENOUS at 20:24

## 2021-09-06 RX ADMIN — Medication 2000 UNITS: at 15:49

## 2021-09-06 RX ADMIN — ATORVASTATIN CALCIUM 20 MG: 20 TABLET, FILM COATED ORAL at 20:25

## 2021-09-06 RX ADMIN — REMDESIVIR 200 MG: 100 INJECTION, POWDER, LYOPHILIZED, FOR SOLUTION INTRAVENOUS at 14:17

## 2021-09-06 RX ADMIN — PYRIDOXINE HCL TAB 50 MG 50 MG: 50 TAB at 15:49

## 2021-09-06 ASSESSMENT — ENCOUNTER SYMPTOMS
COUGH: 1
DIARRHEA: 0
TROUBLE SWALLOWING: 0
VOICE CHANGE: 0
SHORTNESS OF BREATH: 1
NAUSEA: 0
VOMITING: 0
PHOTOPHOBIA: 0
ABDOMINAL PAIN: 0

## 2021-09-06 ASSESSMENT — PAIN SCALES - GENERAL
PAINLEVEL_OUTOF10: 0
PAINLEVEL_OUTOF10: 0

## 2021-09-06 NOTE — ED PROVIDER NOTES
HPI   Patient is a 66-year-old male with past medical history of CKD stage III, hypothyroidism, PE and CAD presenting due to acute shortness of breath secondary to COVID-19 infection. Patient reports that he received a positive diagnosis of Covid 1 week ago. He states that he has been progressively short of breath over the last week with worsening symptoms last night. Home pulse ox readings in the 70s which brought him to the emergency department. Patient does not wear home oxygen at baseline. He reports that his wife is sick at home as well. Patient also endorses myalgias, fatigue, decreased p.o. intake, chills but no fevers. On initial evaluation, patient appeared in mild respiratory distress with O2 saturation around 85-90. Briefly placed patient on nonrebreather at 15 L with improvement in saturations. Review of Systems   Constitutional: Positive for appetite change, chills and fatigue. Negative for fever. HENT: Negative for trouble swallowing and voice change. Eyes: Negative for photophobia and visual disturbance. Respiratory: Positive for cough and shortness of breath. Cardiovascular: Negative for chest pain and palpitations. Gastrointestinal: Negative for abdominal pain, diarrhea, nausea and vomiting. Genitourinary: Negative for dysuria. Musculoskeletal: Positive for myalgias. Negative for arthralgias. Skin: Negative for rash and wound. Neurological: Negative for dizziness and headaches. Psychiatric/Behavioral: Negative for behavioral problems and confusion. Physical Exam  Constitutional:       General: He is not in acute distress. Appearance: Normal appearance. He is obese. He is not ill-appearing. HENT:      Head: Normocephalic and atraumatic. Nose: Nose normal.      Mouth/Throat:      Mouth: Mucous membranes are moist.      Pharynx: Oropharynx is clear. Eyes:      Extraocular Movements: Extraocular movements intact.       Conjunctiva/sclera: Conjunctivae normal.   Cardiovascular:      Rate and Rhythm: Normal rate and regular rhythm. Pulses: Normal pulses. Heart sounds: Normal heart sounds. Pulmonary:      Effort: Respiratory distress present. Breath sounds: Normal breath sounds. No wheezing or rales. Comments: Mild to moderate respiratory distress. Diminished breath sounds throughout. Abdominal:      General: Abdomen is flat. Palpations: Abdomen is soft. Tenderness: There is no abdominal tenderness. There is no guarding or rebound. Musculoskeletal:      Cervical back: Normal range of motion and neck supple. Skin:     General: Skin is warm and dry. Capillary Refill: Capillary refill takes less than 2 seconds. Neurological:      General: No focal deficit present. Mental Status: He is alert and oriented to person, place, and time. Cranial Nerves: No cranial nerve deficit. Coordination: Coordination normal.   Psychiatric:         Mood and Affect: Mood normal.         Behavior: Behavior normal.          Procedures   EKG: This EKG is signed and interpreted by me. Normal sinus rhythm with ventricular rate of 88 bpm.  Left axis. MN interval prolonged with first-degree AV block. QTc normal.  No ST elevations. No significant changes compared to previous EKG on 9/4/21. MDM   Patient is a 45-year-old male with past medical history of CKD stage III, hypothyroidism, PE and CAD presenting due to shortness of breath. Patient has known COVID-19 infection and has been experiencing worsening shortness of breath with reported desaturations into the 70s at home. On initial evaluation, patient with O2 saturation of 84% on room air. Patient started on 6 L via nasal cannula with improvement in his saturations. On reevaluation, patient showing multiple desaturations into the 80s. BiPAP ordered for patient considering recurrent destaurations.  Patient tolerated partial non-re breather at 15L well with improvement in his saturations. Patient given IV Decadron. Pharmacy consult to dose remdesmivir. Patient discussed with hospitalist who agreed to admit patient for acute hypoxic respiratory failure 2/2 to COVD-19 infection. ED Course as of Sep 07 2156   Mon Sep 06, 2021   1029 ATTENDING PROVIDER ATTESTATION:     I have personally performed and/or participated in the history, exam, medical decision making, and procedures and agree with all pertinent clinical information unless otherwise noted. I have also reviewed and agree with the past medical, family and social history unless otherwise noted. I have discussed this patient in detail with the resident and provided the instruction and education regarding the evidence-based evaluation and treatment of COVID-19 resulted in shortness of breath. History: Patient with recently diagnosed COVID-19 infection. He had increasing shortness of breath which brings him here today for evaluation. My findings: Tonya Villalobos is a 68 y.o. male whom is in no distress. Physical exam reveals that he is dyspneic at rest.  Heart is borderline tachycardic. Lungs are coarse bilaterally with scattered rhonchi. Abdomen is soft and nontender. Extremities are intact no edema. Skin is cool and dry. Pulse oximetry 84% on room air. My plan: Symptomatic and supportive care. Sepsis evaluation. Steroids. X-ray. Admission. Electronically signed by Betsy Escamilla DO on 9/6/21 at 10:29 AM EDT          [TG]   1045 Patient reevaluated with saturation on 7 L nasal cannula dipping into the 80s. Will start patient on BiPAP. Lactic 3.87 reported by nursing. Initial troponin of 83. Mild transaminitis with ALT 63, AST 70.    [PP]   1118 Chest x-ray significant for slightly worsening infiltrates likely in the setting of worsening COVID-19 pneumonia.     [PP]   720 1466 Discussed patient with respiratory therapist who advised to try patient on 15 L partial nonrebreather before starting Urine voided   Result Value Ref Range    L. pneumophila Serogp 1 Ur Ag       Presumptive Negative -suggesting no recent or current infections  with Legionella pneumophila serogroup 1. Infection to Legionella cannot be ruled out since other serogroups  and species may cause infection, antigen may not be present in  early infection, or level of antigen may be below the  detection limit.   Normal Range: Presumptive Negative     Basic Metabolic Panel w/ Reflex to MG   Result Value Ref Range    Sodium 138 132 - 146 mmol/L    Potassium reflex Magnesium 4.3 3.5 - 5.0 mmol/L    Chloride 100 98 - 107 mmol/L    CO2 25 22 - 29 mmol/L    Anion Gap 13 7 - 16 mmol/L    Glucose 120 (H) 74 - 99 mg/dL    BUN 52 (H) 6 - 23 mg/dL    CREATININE 1.7 (H) 0.7 - 1.2 mg/dL    GFR Non-African American 39 >=60 mL/min/1.73    GFR African American 48     Calcium 9.9 8.6 - 10.2 mg/dL   CBC Auto Differential   Result Value Ref Range    WBC 15.1 (H) 4.5 - 11.5 E9/L    RBC 4.75 3.80 - 5.80 E12/L    Hemoglobin 15.5 12.5 - 16.5 g/dL    Hematocrit 47.5 37.0 - 54.0 %    .0 (H) 80.0 - 99.9 fL    MCH 32.6 26.0 - 35.0 pg    MCHC 32.6 32.0 - 34.5 %    RDW 15.9 (H) 11.5 - 15.0 fL    Platelets 022 363 - 104 E9/L    MPV 11.9 7.0 - 12.0 fL    Neutrophils % 92.2 (H) 43.0 - 80.0 %    Lymphocytes % 1.7 (L) 20.0 - 42.0 %    Monocytes % 5.2 2.0 - 12.0 %    Eosinophils % 0.1 0.0 - 6.0 %    Basophils % 0.3 0.0 - 2.0 %    Neutrophils Absolute 14.04 (H) 1.80 - 7.30 E9/L    Lymphocytes Absolute 0.30 (L) 1.50 - 4.00 E9/L    Monocytes Absolute 0.76 0.10 - 0.95 E9/L    Eosinophils Absolute 0.00 (L) 0.05 - 0.50 E9/L    Basophils Absolute 0.00 0.00 - 0.20 E9/L    Myelocyte Percent 0.9 0 - 0 %    Anisocytosis 1+     Poikilocytes 1+     Yayo Cells 1+     Ovalocytes 1+     Tear Drop Cells 1+    Lactate, Sepsis   Result Value Ref Range    Lactic Acid, Sepsis 3.9 (HH) 0.5 - 1.9 mmol/L   Lactate, Sepsis   Result Value Ref Range    Lactic Acid, Sepsis 2.7 (H) 0.5 - 1.9 mmol/L   Troponin   Result Value Ref Range    Troponin, High Sensitivity 83 (H) 0 - 11 ng/L   Hepatic Function Panel   Result Value Ref Range    Total Protein 6.6 6.4 - 8.3 g/dL    Albumin 3.0 (L) 3.5 - 5.2 g/dL    Alkaline Phosphatase 81 40 - 129 U/L    ALT 63 (H) 0 - 40 U/L    AST 70 (H) 0 - 39 U/L    Total Bilirubin 0.8 0.0 - 1.2 mg/dL    Bilirubin, Direct 0.3 0.0 - 0.3 mg/dL    Bilirubin, Indirect 0.5 0.0 - 1.0 mg/dL   Blood Gas, Arterial   Result Value Ref Range    Date Analyzed 20210906     Time Analyzed 1011     Source: Blood Arterial     pH, Blood Gas 7.480 (H) 7.350 - 7.450    PCO2 25.8 (L) 35.0 - 45.0 mmHg    PO2 61.4 (L) 75.0 - 100.0 mmHg    HCO3 18.8 (L) 22.0 - 26.0 mmol/L    B.E. -2.8 -3.0 - 3.0 mmol/L    O2 Sat 91.6 (L) 92.0 - 98.5 %    O2Hb 90.6 (L) 94.0 - 97.0 %    COHb 0.8 0.0 - 1.5 %    MetHb 0.3 0.0 - 1.5 %    O2 Content 20.3 mL/dL    HHb 8.3 (H) 0.0 - 5.0 %    tHb (est) 16.0 11.5 - 16.5 g/dL    Mode NC- 6L     Date Of Collection      Time Collected      Pt Temp 37           Lab [id number]     Critical(s) Notified .  No Critical Values    C-reactive protein   Result Value Ref Range    CRP 7.4 (H) 0.0 - 0.4 mg/dL   SPECIMEN REJECTION   Result Value Ref Range    Rejected Test dimer     Reason for Rejection see below    COMPREHENSIVE METABOLIC PANEL   Result Value Ref Range    Sodium 137 132 - 146 mmol/L    Potassium 3.8 3.5 - 5.0 mmol/L    Chloride 103 98 - 107 mmol/L    CO2 21 (L) 22 - 29 mmol/L    Anion Gap 13 7 - 16 mmol/L    Glucose 214 (H) 74 - 99 mg/dL    BUN 54 (H) 6 - 23 mg/dL    CREATININE 1.4 (H) 0.7 - 1.2 mg/dL    GFR Non-African American 49 >=60 mL/min/1.73    GFR African American 60     Calcium 9.5 8.6 - 10.2 mg/dL    Total Protein 6.1 (L) 6.4 - 8.3 g/dL    Albumin 2.8 (L) 3.5 - 5.2 g/dL    Total Bilirubin 0.4 0.0 - 1.2 mg/dL    Alkaline Phosphatase 74 40 - 129 U/L    ALT 99 (H) 0 - 40 U/L    AST 82 (H) 0 - 39 U/L   Lactate Dehydrogenase   Result Value Ref Range     (H) 135 - 225 U/L   Procalcitonin   Result Value Ref Range    Procalcitonin 0.25 (H) 0.00 - 0.08 ng/mL   Ferritin   Result Value Ref Range    Ferritin 1,223 ng/mL   Fibrinogen   Result Value Ref Range    Fibrinogen >700 (H) 225 - 540 mg/dL   Troponin   Result Value Ref Range    Troponin, High Sensitivity 47 (H) 0 - 11 ng/L   D-Dimer, Quantitative   Result Value Ref Range    D-Dimer, Quant >5250 ng/mL DDU   C-Reactive Protein   Result Value Ref Range    CRP 4.7 (H) 0.0 - 0.4 mg/dL   Protime-INR   Result Value Ref Range    Protime 15.2 (H) 9.3 - 12.4 sec    INR 1.3    EKG 12 Lead   Result Value Ref Range    Ventricular Rate 88 BPM    Atrial Rate 88 BPM    P-R Interval 226 ms    QRS Duration 94 ms    Q-T Interval 368 ms    QTc Calculation (Bazett) 445 ms    P Axis 84 degrees    R Axis -7 degrees    T Axis 56 degrees       RADIOLOGY:  CT HEAD WO CONTRAST   Final Result   1. Decreased size of the small right subdural hemorrhage      2. Stable left arachnoid cyst      3. No signs of an acute intracranial lesion      4. 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 Other chronic appearing findings. Continued follow-up recommended. XR CHEST PORTABLE   Final Result   Slightly worsened infiltrates may reflect increase edema and/or atelectasis. Differential includes worsened multifocal pneumonia, bacterial versus viral.   While nonspecific, this appearance can be seen with slight worsening of   COVID-19 pneumonia, given patient history. ------------------------- NURSING NOTES AND VITALS REVIEWED ---------------------------  Date / Time Roomed:  9/6/2021  8:43 AM  ED Bed Assignment:  0615/0615-01    The nursing notes within the ED encounter and vital signs as below have been reviewed.      Patient Vitals for the past 24 hrs:   BP Temp Temp src Pulse Resp SpO2   09/07/21 2015 (!) 92/54 97.5 °F (36.4 °C) Infrared 71 19 93 %   09/07/21 1345 101/63 98 °F (36.7 °C) Infrared 76 18 93 %   09/07/21 0745 109/63 97.1 °F (36.2 °C) Infrared 66 19 94 %   09/07/21 0700 -- -- -- -- -- 92 %   09/07/21 0558 -- -- -- -- -- 92 %   09/07/21 0555 -- -- -- -- -- 94 %   09/07/21 0544 -- -- -- -- -- 90 %   09/07/21 0530 -- -- -- -- -- (!) 86 %   09/07/21 0200 102/67 97.3 °F (36.3 °C) Infrared 78 20 90 %       Oxygen Saturation Interpretation: Improved after treatment    ------------------------------------------ PROGRESS NOTES ------------------------------------------  Re-evaluation(s):  See ED course    Counseling:  I have spoken with the patient and discussed todays results, in addition to providing specific details for the plan of care and counseling regarding the diagnosis and prognosis. Their questions are answered at this time and they are agreeable with the plan of admission.    --------------------------------- ADDITIONAL PROVIDER NOTES ---------------------------------  Consultations:  Spoke with on-call hospitalists. Discussed case. They will admit the patient. This patient's ED course included: a personal history and physicial examination, re-evaluation prior to disposition, multiple bedside re-evaluations, IV medications, cardiac monitoring, continuous pulse oximetry and complex medical decision making and emergency management    This patient has remained hemodynamically stable during their ED course. Diagnosis:  1. Acute respiratory failure due to COVID-19 ([facility] [address] Stable       Disposition:  Patient's disposition: Admit to telemetry  Patient's condition is fair.              Vinayak Mckeon DO  Resident  09/07/21 2839

## 2021-09-06 NOTE — LETTER
Beneficiary Notification Letter  [employer]     Your Doctor or Mariaa,    We wanted to let you know that your health care provider, Robert Escamilla, has volunteered to take part in our [facility] for [facility] & Medicaid Services (CMS) Bundled Payments for [facility] ([facility]). This doesnt change your Medicare rights or benefits and you dont need to do anything. What are bundled payments? A bundled payment combines, or bundles together, payments that Medicare makes to your health care providers for the many different kinds of medical services you might get in a specific time period. In [employer], this time period could include a hospital  inpatient stay or outpatient procedure, plus 90 days. Why would Medicare bundle payments? Bundled payments are thought of as a value-based way to pay because health care  providers are responsible for both the quality and cost of medical care they give. This is  a relatively new way of paying health care providers compared to thefee-for-service  way Medicare has traditionally paid, where providers are paid separately for each  service they provide. Bundled payments encourage these providers to work together to  provide better, more coordinated care during your hospital stay, or outpatient procedure,  and through your recovery. What does [employer] [facility] mean for me? Youre more likely to get even better care when hospitals, doctors, and other health care  providers work together. In [employer], hospitals, doctors, and other health care  providers may be rewarded for providing better, more coordinated health care. Medicare  will watch [employer] participants closely to make sure that you and other patients  keep getting efficient, high quality care. What do I need to know about [employer]?   Whats most important for [employer]. A Clinical Episode is a grouping of medical conditions or   diagnoses that are included in the [id number] [place] [address].

## 2021-09-06 NOTE — H&P
[address] [place], [place] [address]ist Group   History and Physical      CHIEF COMPLAINT: Worsening shortness of breath due to COVID-19    History of Present Illness:  68 y.o. male with a history of hypertension, CKD stage III, traumatic subdural hematoma June 2021 who presents with shortness of breath, productive cough and worsening oxygenation at home. Patient had pulse ox readings at home that were in the 70s and that would brought him to the emergency room. He is not on chronic oxygen. Wife is sick at home as well. Patient admits to feeling weak overall with decreased appetite having chills but no fevers. Initial O2 saturation was 85 to 90%. He was placed on 6 L of oxygen but at 1 point titrated up to 15 L while in the emergency room. Patient was Covid tested and it was positive. Given the hypoxia, he qualify for remdesivir and that was ordered and given upon admission to the floor. Of note, patient presented to the ER 9/4/21 with symptoms of Covid which had occurred few days prior to that evaluation. He had been tested as an outpatient and again in the ER visit was positive for Covid. Patient was discharged home on cefdinir 300 mg twice daily for 10 days, doxycycline 100 mg twice a day for 14 days, and dexamethasone 6 mg twice daily for 10 days. He was given albuterol inhaler as well as Tessalon Perles for symptomatic relief. At ER visit, he was ambulated but did maintain O2 sat at 95%. Informant(s) for H&P:patient and EMR. REVIEW OF SYSTEMS:  As per HPI, otherwise negative.       PMH:  Past Medical History:   Diagnosis Date    Bilateral renal cysts 10/13/2014    CAD (coronary artery disease)     Cardiomyopathy ([facility]     Cerebral artery occlusion with cerebral infarction [facility])     Chest pain     8-4-2016 lexiscan stress    Hemorrhagic stroke ([facility]     History of gout 10/13/2014    History of pulmonary embolism 10/13/2014    Hyperlipidemia     Hypertension     Hyperthyroidism 10/13/2014    NSTEMI (non-ST elevated myocardial infarction) ([facility] 10/13/2014    Obesity due to excess calories     Thyroid cyst 10/13/2014    Thyroid disease        Surgical History:  Past Surgical History:   Procedure Laterality Date    BACK SURGERY      CORONARY ANGIOPLASTY WITH STENT PLACEMENT  10-    Dr. Oscar Dodson 3.0x18 MID LAD    CRANIOTOMY Right 6/25/2021    RIGHT FRONTAL REMINGTON HOLE TO DRAIN HEMATOMA AND PLACEMENT OF SUBDURAL DRAIN performed by Kelly Oden MD at [address] CATH LAB PROCEDURE  10/13/2014    Dr. Terri Kathleen: PCI to LAD       Medications Prior to Admission:    Prior to Admission medications    Medication Sig Start Date End Date Taking? Authorizing Provider   cefdinir (OMNICEF) 300 MG capsule Take 1 capsule by mouth 2 times daily for 10 days 9/4/21 9/14/21 Yes Archie Liao DO   benzonatate (TESSALON PERLES) 100 MG capsule Take 1 capsule by mouth 3 times daily as needed for Cough 9/4/21 9/11/21 Yes Archie Liao DO   albuterol sulfate HFA (VENTOLIN HFA) 108 (90 Base) MCG/ACT inhaler Inhale 2 puffs into the lungs 4 times daily as needed for Wheezing 9/4/21  Yes Archie Liao DO   dexamethasone (DECADRON) 6 MG tablet Take 1 tablet by mouth 2 times daily (with meals) for 10 days 9/4/21 9/14/21 Yes Archie Cabrera DO   levETIRAcetam (KEPPRA) 500 MG tablet Take 1 tablet by mouth 2 times daily 8/25/21  Yes Kelly Oden MD   methIMAzole (TAPAZOLE) 5 MG tablet Take 1.5 tablets by mouth daily 1.5 tabs 7/22/21  Yes Ilan Beebe MD   LORazepam (ATIVAN) 1 MG tablet Take 1 mg by mouth nightly as needed for Anxiety.    Yes Historical Provider, MD   lisinopril (PRINIVIL;ZESTRIL) 10 MG tablet Take 10 mg by mouth daily   Yes Historical Provider, MD   colchicine-probenecid 0.5-500 MG per tablet TAKE ONE TABLET BY MOUTH EVERY DAY 10/7/20  Yes Historical Provider, MD   hydrochlorothiazide (MICROZIDE) 12.5 MG capsule Take 12.5 mg by mouth daily   Yes Historical Provider, MD   metoprolol succinate ER (TOPROL-XL) 100 MG XL tablet Take 100 mg by mouth daily   Yes Historical Provider, MD   allopurinol (ZYLOPRIM) 300 MG tablet Take 300 mg by mouth daily. Yes Historical Provider, MD   nitroGLYCERIN (NITROSTAT) 0.4 MG SL tablet Place 1 tablet under the tongue every 5 minutes as needed for Chest pain. 10/14/14   Navneet Villar MD       Allergies:    Patient has no known allergies. Social History:    reports that he has never smoked. He has never used smokeless tobacco. He reports current alcohol use. He reports that he does not use drugs. Family History:   family history includes Heart Disease in his father and mother. PHYSICAL EXAM:  Vitals:  /64   Pulse 82   Temp 98 °F (36.7 °C) (Infrared)   Resp 20   Ht 6' (1.829 m)   Wt 230 lb (104.3 kg)   SpO2 91%   BMI 31.19 kg/m²     General Appearance: alert and oriented to person, place and time and in no acute distress but does have some conversational dyspnea. Skin: warm and dry  Head: normocephalic and atraumatic  Eyes: pupils equal, round, and reactive to light, extraocular eye movements intact, conjunctivae normal  Neck: neck supple and non tender without mass   Pulmonary/Chest: diminished breath sounds with scattered wheezes.   Cardiovascular: normal rate, normal S1 and S2 and no carotid bruits  Abdomen: soft, non-tender, non-distended, normal bowel sounds, no masses or organomegaly  Extremities: no cyanosis, no clubbing and no edema  Neurologic: no cranial nerve deficit and speech normal    LABS:  Recent Labs     09/04/21  1610 09/06/21  0940    138   K 4.1 4.3   CL 99 100   CO2 27 25   BUN 37* 52*   CREATININE 1.6* 1.7*   GLUCOSE 114* 120*   CALCIUM 9.9 9.9       Recent Labs     09/04/21  1610 09/06/21  0940   WBC 13.0* 15.1*   RBC 5.08 4.75   HGB 16.7* 15.5   HCT 49.3 47.5   MCV 97.0 100.0*   MCH 32.9 32.6   MCHC 33.9 32.6   RDW 15.7* 15.9*   * 135   MPV 11.4 11.9       COVID-19, Rapid [[id number]] (Abnormal) Collected: 09/06/21 0940     Specimen: Nasopharyngeal Swab Updated: 09/06/21 1049      SARS-CoV-2, NAAT DETECTED       Lactate, Sepsis [[id number]] (Abnormal) Collected: 09/06/21 1414     Specimen: Blood Updated: 09/06/21 1441      Lactic Acid, Sepsis 2.7 mmol/L          Lactate, Sepsis [[id number]] (Abnormal) Collected: 09/06/21 0940     Specimen: Blood Updated: 09/06/21 1046      Lactic Acid, Sepsis 3.9 mmol/L      Narrative:       [place] Draft [id number],   Chemistry results called to and read back by Thelma Mcgarry RN, 09/06/2021   10:46, by Torri Hernandez       Troponin [[id number]] (Abnormal) Collected: 09/06/21 0940     Specimen: Blood Updated: 09/06/21 1048      Troponin, High Sensitivity 83 ng/L          Radiology:     XR CHEST PORTABLE    Result Date: 9/6/2021  EXAMINATION: ONE XRAY VIEW OF THE CHEST 9/6/2021 9:38 am COMPARISON: 09/04/2021 HISTORY: ORDERING SYSTEM PROVIDED HISTORY: shortness of breath TECHNOLOGIST PROVIDED HISTORY: COVID. Hypertension. Nonsmoker. Reason for exam:-> acute shortness of breath secondary to COVID infection FINDINGS: Small interstitial and slightly alveolar infiltrate is unchanged in the right mid lung. Similar infiltrate unchanged in left lung base. New slight interstitial infiltrate in right lung base. No new pulmonary consolidation. No pneumothorax or pleural effusion. Body habitus appears large. Cardiac silhouette size slightly enlarged without definite vascular congestion. Slight degenerative change in the regional skeleton. Slightly worsened infiltrates may reflect increase edema and/or atelectasis. Differential includes worsened multifocal pneumonia, bacterial versus viral. While nonspecific, this appearance can be seen with slight worsening of COVID-19 pneumonia, given patient history. EKG: NSR at rate of 88 with 1st degree AVB.       ASSESSMENT:      Principal Problem:    Acute hypoxemic respiratory failure due to COVID-19 ER visit 9/4  -EKG without acute ischemic changes. Code Status: FULL  DVT prophylaxis: Lovenox after neurosurgery clearance. NOTE: This report was transcribed using voice recognition software.  Every effort was made to ensure accuracy; however, inadvertent computerized transcription errors may be present.     Electronically signed by Ankit Calero MD on 9/6/2021 at 4:45 PM

## 2021-09-06 NOTE — ED NOTES
Patient evaluated and placed on 6L NC O2 in lobby while pending available private bed.       Hermelinda Correia RN  09/06/21 [id number]

## 2021-09-07 LAB
ALBUMIN SERPL-MCNC: 2.8 G/DL (ref 3.5–5.2)
ALP BLD-CCNC: 74 U/L (ref 40–129)
ALT SERPL-CCNC: 99 U/L (ref 0–40)
ANION GAP SERPL CALCULATED.3IONS-SCNC: 13 MMOL/L (ref 7–16)
AST SERPL-CCNC: 82 U/L (ref 0–39)
BILIRUB SERPL-MCNC: 0.4 MG/DL (ref 0–1.2)
BUN BLDV-MCNC: 54 MG/DL (ref 6–23)
C-REACTIVE PROTEIN: 4.7 MG/DL (ref 0–0.4)
CALCIUM SERPL-MCNC: 9.5 MG/DL (ref 8.6–10.2)
CHLORIDE BLD-SCNC: 103 MMOL/L (ref 98–107)
CO2: 21 MMOL/L (ref 22–29)
CREAT SERPL-MCNC: 1.4 MG/DL (ref 0.7–1.2)
D DIMER: >5250 NG/ML DDU
FERRITIN: 1223 NG/ML
FIBRINOGEN: >700 MG/DL (ref 225–540)
GFR AFRICAN AMERICAN: 60
GFR NON-AFRICAN AMERICAN: 49 ML/MIN/1.73
GLUCOSE BLD-MCNC: 214 MG/DL (ref 74–99)
INR BLD: 1.3
L. PNEUMOPHILA SEROGP 1 UR AG: NORMAL
LACTATE DEHYDROGENASE: 252 U/L (ref 135–225)
POTASSIUM SERPL-SCNC: 3.8 MMOL/L (ref 3.5–5)
PROCALCITONIN: 0.25 NG/ML (ref 0–0.08)
PROTHROMBIN TIME: 15.2 SEC (ref 9.3–12.4)
SODIUM BLD-SCNC: 137 MMOL/L (ref 132–146)
STREP PNEUMONIAE ANTIGEN, URINE: NORMAL
TOTAL PROTEIN: 6.1 G/DL (ref 6.4–8.3)
TROPONIN, HIGH SENSITIVITY: 47 NG/L (ref 0–11)

## 2021-09-07 PROCEDURE — 84145 PROCALCITONIN (PCT): CPT

## 2021-09-07 PROCEDURE — 6370000000 HC RX 637 (ALT 250 FOR IP): Performed by: INTERNAL MEDICINE

## 2021-09-07 PROCEDURE — 82728 ASSAY OF FERRITIN: CPT

## 2021-09-07 PROCEDURE — 86140 C-REACTIVE PROTEIN: CPT

## 2021-09-07 PROCEDURE — 2500000003 HC RX 250 WO HCPCS: Performed by: INTERNAL MEDICINE

## 2021-09-07 PROCEDURE — 99233 SBSQ HOSP IP/OBS HIGH 50: CPT | Performed by: INTERNAL MEDICINE

## 2021-09-07 PROCEDURE — 83615 LACTATE (LD) (LDH) ENZYME: CPT

## 2021-09-07 PROCEDURE — 6360000002 HC RX W HCPCS: Performed by: INTERNAL MEDICINE

## 2021-09-07 PROCEDURE — 80053 COMPREHEN METABOLIC PANEL: CPT

## 2021-09-07 PROCEDURE — 36415 COLL VENOUS BLD VENIPUNCTURE: CPT

## 2021-09-07 PROCEDURE — 85384 FIBRINOGEN ACTIVITY: CPT

## 2021-09-07 PROCEDURE — 2060000000 HC ICU INTERMEDIATE R&B

## 2021-09-07 PROCEDURE — 84484 ASSAY OF TROPONIN QUANT: CPT

## 2021-09-07 PROCEDURE — 85610 PROTHROMBIN TIME: CPT

## 2021-09-07 PROCEDURE — 2580000003 HC RX 258: Performed by: STUDENT IN AN ORGANIZED HEALTH CARE EDUCATION/TRAINING PROGRAM

## 2021-09-07 PROCEDURE — 85378 FIBRIN DEGRADE SEMIQUANT: CPT

## 2021-09-07 PROCEDURE — 94664 DEMO&/EVAL PT USE INHALER: CPT

## 2021-09-07 PROCEDURE — 2500000003 HC RX 250 WO HCPCS: Performed by: STUDENT IN AN ORGANIZED HEALTH CARE EDUCATION/TRAINING PROGRAM

## 2021-09-07 PROCEDURE — 94640 AIRWAY INHALATION TREATMENT: CPT

## 2021-09-07 RX ORDER — LANOLIN ALCOHOL/MO/W.PET/CERES
6 CREAM (GRAM) TOPICAL NIGHTLY PRN
Status: DISCONTINUED | OUTPATIENT
Start: 2021-09-07 | End: 2021-09-11 | Stop reason: HOSPADM

## 2021-09-07 RX ADMIN — BUDESONIDE AND FORMOTEROL FUMARATE DIHYDRATE 2 PUFF: 160; 4.5 AEROSOL RESPIRATORY (INHALATION) at 07:00

## 2021-09-07 RX ADMIN — REMDESIVIR 100 MG: 100 INJECTION, POWDER, LYOPHILIZED, FOR SOLUTION INTRAVENOUS at 21:24

## 2021-09-07 RX ADMIN — PROBENECID AND COLCHICINE 1 TABLET: 500; .5 TABLET ORAL at 07:56

## 2021-09-07 RX ADMIN — ENOXAPARIN SODIUM 40 MG: 40 INJECTION SUBCUTANEOUS at 09:01

## 2021-09-07 RX ADMIN — Medication 2000 UNITS: at 07:55

## 2021-09-07 RX ADMIN — ATORVASTATIN CALCIUM 20 MG: 20 TABLET, FILM COATED ORAL at 20:30

## 2021-09-07 RX ADMIN — ZINC SULFATE 220 MG (50 MG) CAPSULE 50 MG: CAPSULE at 07:59

## 2021-09-07 RX ADMIN — BENZONATATE 100 MG: 100 CAPSULE ORAL at 07:55

## 2021-09-07 RX ADMIN — LORAZEPAM 1 MG: 1 TABLET ORAL at 02:11

## 2021-09-07 RX ADMIN — LEVETIRACETAM 500 MG: 500 TABLET ORAL at 07:57

## 2021-09-07 RX ADMIN — METOPROLOL SUCCINATE 100 MG: 25 TABLET, EXTENDED RELEASE ORAL at 07:55

## 2021-09-07 RX ADMIN — DEXAMETHASONE SODIUM PHOSPHATE 10 MG: 10 INJECTION, SOLUTION INTRAMUSCULAR; INTRAVENOUS at 07:57

## 2021-09-07 RX ADMIN — BUDESONIDE AND FORMOTEROL FUMARATE DIHYDRATE 2 PUFF: 160; 4.5 AEROSOL RESPIRATORY (INHALATION) at 17:05

## 2021-09-07 RX ADMIN — ALLOPURINOL 300 MG: 300 TABLET ORAL at 07:57

## 2021-09-07 RX ADMIN — DEXAMETHASONE SODIUM PHOSPHATE 10 MG: 10 INJECTION, SOLUTION INTRAMUSCULAR; INTRAVENOUS at 21:22

## 2021-09-07 RX ADMIN — OXYCODONE HYDROCHLORIDE AND ACETAMINOPHEN 500 MG: 500 TABLET ORAL at 20:30

## 2021-09-07 RX ADMIN — PYRIDOXINE HCL TAB 50 MG 50 MG: 50 TAB at 07:56

## 2021-09-07 RX ADMIN — LEVETIRACETAM 500 MG: 500 TABLET ORAL at 20:30

## 2021-09-07 RX ADMIN — BENZONATATE 100 MG: 100 CAPSULE ORAL at 02:13

## 2021-09-07 RX ADMIN — OXYCODONE HYDROCHLORIDE AND ACETAMINOPHEN 500 MG: 500 TABLET ORAL at 07:56

## 2021-09-07 RX ADMIN — Medication 6 MG: at 21:27

## 2021-09-07 RX ADMIN — LISINOPRIL 10 MG: 10 TABLET ORAL at 07:56

## 2021-09-07 RX ADMIN — BARICITINIB 2 MG: 2 TABLET, FILM COATED ORAL at 12:20

## 2021-09-07 RX ADMIN — ENOXAPARIN SODIUM 40 MG: 40 INJECTION SUBCUTANEOUS at 20:30

## 2021-09-07 RX ADMIN — BENZONATATE 100 MG: 100 CAPSULE ORAL at 20:31

## 2021-09-07 RX ADMIN — METHIMAZOLE 7.5 MG: 5 TABLET ORAL at 07:54

## 2021-09-07 ASSESSMENT — PAIN SCALES - GENERAL
PAINLEVEL_OUTOF10: 0

## 2021-09-07 NOTE — CARE COORDINATION
SS Note: Covid Positive. SW attempted to call pt to complete ACP but no answer of phone. Chart review notes pt has past hx of HHC w/[place] [facility]- this past June. Pt lives w/wife in ranch home with 3 front steps to enter. Their grown daughter lives with them too.    Electronically signed by ANNELIESE Cortez on 9/7/2021 at 2:15 PM

## 2021-09-07 NOTE — PLAN OF CARE
Problem: Airway Clearance - Ineffective  Goal: Achieve or maintain patent airway  Outcome: Met This Shift     Problem: Gas Exchange - Impaired  Goal: Absence of hypoxia  Outcome: Met This Shift     Problem: Gas Exchange - Impaired  Goal: Promote optimal lung function  Outcome: Met This Shift     Problem: Breathing Pattern - Ineffective  Goal: Ability to achieve and maintain a regular respiratory rate  Outcome: Met This Shift     Problem: Body Temperature -  Risk of, Imbalanced  Goal: Will regain or maintain usual level of consciousness  Outcome: Met This Shift     Problem:  Body Temperature -  Risk of, Imbalanced  Goal: Complications related to the disease process, condition or treatment will be avoided or minimized  Outcome: Met This Shift     Problem: Isolation Precautions - Risk of Spread of Infection  Goal: Prevent transmission of infection  Outcome: Met This Shift     Problem: Risk for Fluid Volume Deficit  Goal: Maintain absence of muscle cramping  Outcome: Met This Shift     Problem: Loneliness or Risk for Loneliness  Goal: Demonstrate positive use of time alone when socialization is not possible  Outcome: Met This Shift     Problem: Fatigue  Goal: Verbalize increase energy and improved vitality  Outcome: Met This Shift     Problem: Falls - Risk of:  Goal: Will remain free from falls  Description: Will remain free from falls  Outcome: Met This Shift     Problem: Falls - Risk of:  Goal: Absence of physical injury  Description: Absence of physical injury  Outcome: Met This Shift

## 2021-09-07 NOTE — PROGRESS NOTES
[address] [place], [place] [address]ist   Progress Note    Admitting Date and Time: 9/6/2021  8:43 AM  Admit Dx: Acute respiratory failure with hypoxia ([facility] [J96.01]  [facility] [U07.1, J96.00]    Subjective:    9/7: Patient states he is doing better today. He has been doing his breathing exercises. He has been trying to lay on his prone but cannot fully get to that position but does state rotate from side to side. Per RN: Last night patient desaturated going to the bathroom despite being on 5 L he needed to be placed on 7 L for about 10 minutes to recover then was titrated back down to 5 L.   Current O2 sat is 93% on 5L       baricitinib  2 mg Oral Daily    remdesivir IVPB  100 mg IntraVENous Q24H    allopurinol  300 mg Oral Daily    atorvastatin  20 mg Oral Nightly    colchicine-probenecid  1 tablet Oral Daily    metoprolol succinate  100 mg Oral Daily    methIMAzole  7.5 mg Oral Daily    lisinopril  10 mg Oral Daily    levETIRAcetam  500 mg Oral BID    enoxaparin  40 mg SubCUTAneous BID    dexamethasone  10 mg IntraVENous Q12H    Vitamin D  2,000 Units Oral Daily    vitamin B-6  50 mg Oral Daily    ascorbic acid  500 mg Oral BID    zinc sulfate  50 mg Oral Daily    budesonide-formoterol  2 puff Inhalation BID     sodium chloride, 30 mL, PRN  benzonatate, 100 mg, TID PRN  butalbital-acetaminophen-caffeine, 1 tablet, Q4H PRN  LORazepam, 1 mg, Nightly PRN         Objective:    /63 Comment: 84/55 R upper arm  Pulse 76   Temp 98 °F (36.7 °C) (Infrared)   Resp 18   Ht 6' (1.829 m)   Wt 230 lb (104.3 kg)   SpO2 93%   BMI 31.19 kg/m²   General Appearance: alert and oriented to person, place and time and in no acute distress  Skin: warm and dry  Head: normocephalic and atraumatic  Eyes: pupils equal, round, and reactive to light, extraocular eye movements intact, conjunctivae normal  Neck: neck supple and non tender without mass   Pulmonary/Chest: clear to auscultation bilaterally- no wheezes, rales or rhonchi, normal air movement, no respiratory distress  Cardiovascular: normal rate, normal S1 and S2 and no carotid bruits  Abdomen: soft, non-tender, non-distended, normal bowel sounds, no masses or organomegaly  Extremities: no cyanosis, no clubbing and no edema  Neurologic: no cranial nerve deficit and speech normal      Recent Labs     09/04/21  1610 09/06/21  0940 09/07/21  0931    138 137   K 4.1 4.3 3.8   CL 99 100 103   CO2 27 25 21*   BUN 37* 52* 54*   CREATININE 1.6* 1.7* 1.4*   GLUCOSE 114* 120* 214*   CALCIUM 9.9 9.9 9.5       Recent Labs     09/04/21 1610 09/06/21  0940 09/07/21  0931   ALKPHOS 81 81 74   PROT 7.2 6.6 6.1*   LABALBU 3.6 3.0* 2.8*   BILITOT 1.3* 0.8 0.4   AST 44* 70* 82*   ALT 31 63* 99*       Recent Labs     09/04/21 1610 09/06/21  0940   WBC 13.0* 15.1*   RBC 5.08 4.75   HGB 16.7* 15.5   HCT 49.3 47.5   MCV 97.0 100.0*   MCH 32.9 32.6   MCHC 33.9 32.6   RDW 15.7* 15.9*   * 135   MPV 11.4 11.9      SARS-COV-2 biomarkers    Recent Labs     09/04/21  1610 09/06/21  0940 09/06/21  1414 09/07/21  0931   CRP  --   --  7.4* 4.7*   PROCAL  --   --   --  0.25*   FERRITIN  --   --   --  1,223   LDH  --   --   --  252*   DDIMER  --   --   --  >5250   FIBRINOGEN  --   --   --  >700*   INR  --   --   --  1.3   PROTIME  --   --   --  15.2*   AST 44* 70*  --  82*   ALT 31 63*  --  99*     Lab Results   Component Value Date    CHOL 168 08/04/2016    TRIG 187 08/04/2016    HDL 48 08/04/2016    LDLCALC 83 08/04/2016    LABVLDL 37 08/04/2016     No results found for: VITD25      Strep Pneumoniae Antigen [[id number]] Collected: 09/06/21 2118     Specimen: Urine voided Updated: 09/07/21 8079      STREP PNEUMONIAE ANTIGEN, URINE --      Presumptive negative- suggests no current or recent   pneumococcal infection.    Infection due to Strep pneumoniae cannot be   ruled out since the antigen present in the sample   may be below the detection limit of the test.   Normal Range:Presumptive Negative      Narrative:       Source: [facility]       Site:                LEGIONELLA ANTIGEN, URINE [[id number]] Collected: 09/06/21 2118     Specimen: Urine voided Updated: 09/07/21 0950      L. pneumophila Serogp 1 Ur Ag --      Presumptive Negative -suggesting no recent or current infections   with Legionella pneumophila serogroup 1. Infection to Legionella cannot be ruled out since other serogroups   and species may cause infection, antigen may not be present in   early infection, or level of antigen may be below the   detection limit. Normal Range: Presumptive Negative      Narrative:       Source: [facility]       Site:       Radiology:   CT HEAD WO CONTRAST   Final Result   1. Decreased size of the small right subdural hemorrhage      2. Stable left arachnoid cyst      3. No signs of an acute intracranial lesion      4. 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 Other chronic appearing findings. Continued follow-up recommended. XR CHEST PORTABLE   Final Result   Slightly worsened infiltrates may reflect increase edema and/or atelectasis. Differential includes worsened multifocal pneumonia, bacterial versus viral.   While nonspecific, this appearance can be seen with slight worsening of   COVID-19 pneumonia, given patient history.              Assessment:  Principal Problem:    Acute hypoxemic respiratory failure due to COVID-19 [facility])  Active Problems:    CKD (chronic kidney disease) stage 3, GFR 30-59 ml/min    Coronary artery disease involving native coronary artery of native heart    Class 1 obesity due to excess calories without serious comorbidity with body mass index (BMI) of 31.0 to 31.9 in adult    Hyperthyroidism    SDH (subdural hematoma) (HCC)    Toxic multinodul goiter  Resolved Problems:    * No resolved hospital problems. *      Plan:    1. Acute hypoxic respiratory failure due to COVID-19  -Supplemental oxygen and wean as tolerated but needing 6-15 L already on admission; patient was counseled by nursing and myself about proning.  -Initiated with Decadron in the ER 6 mg but will up to 10 mg every 12 hours per her new higher dose protocol  -Started with remdesivir day #2/5 treatment  -Placed on Symbicort 160/4.5 2 puffs bid rinse and spit to help with inspiratory effort; IS and PEP flutter valve.  -patient with mild lactic acidosis which improved without IVF. Lactic 3.9-->2.7  -Checked CRP to see if qualifies for tocilizumab but it came back at 7.4. Discussed with Pharm D and patient would be better candidate for baricitinib 2mg daily #1/14.  -Follow COVID labs. CRP trending down to 4.7 today from 7.4.  -Vitamin regimen of B6, vitamin C, vitamin D and zinc  -Checked urinary antigens for strep pneumoniae and Legionella which were negative.  -Lovenox bid if okay with neurosurgery due to SDH in June. Got  CT head which came back showing smaller size of SDH. Discussed with neurosurgery this morning via [employer] and was okayed for anticoagulation but to closely monitor his platelets given his prior thrombocytopenia  -CT chest was also obtained Monday night and this came back showing moderate bilateral groundglass opacities consistent with Covid pneumonia     2. CKD stage 3  -Creatinine 1.7 on admission with prior baseline of 1.3-->1.6  -monitor closely. Creatinine today is 1.4     3. Hyperthyroidism due to [address]  -patient follow with Dr. Isha Jackson.  -he is on methimazole 7.5 mg daily.        4. Traumatic SDH 6/25/21 while on Xarelto  -follow with Dr. Jeanne White of neurosurgery. He was due for follow up CT head 9/15 but will go ahead and obtain CT head now to see if stable and then get clearance for Lovenox.  -Neurosurgery consulted for anticoagulation clearance.   Okay to place on Lovenox as detailed above.  -Continue Keppra for seizure prophylaxis.     5. CAD  -Trop HS is elevated at 83 but this is lower than ER visit 9/4  -EKG without acute ischemic changes.     NOTE: This report was transcribed using voice recognition software. Every effort was made to ensure accuracy; however, inadvertent computerized transcription errors may be present.      Electronically signed by Ankit Calero MD on 9/7/2021 at 2:44 PM

## 2021-09-07 NOTE — PROGRESS NOTES
Confirmed with lab that the procalcitonin, ferritin, and c-reactive protein was drawn this morning. They get sent out to [facility]'s for processing so will result later.

## 2021-09-08 LAB
ALBUMIN SERPL-MCNC: 2.9 G/DL (ref 3.5–5.2)
ALP BLD-CCNC: 72 U/L (ref 40–129)
ALT SERPL-CCNC: 119 U/L (ref 0–40)
ANION GAP SERPL CALCULATED.3IONS-SCNC: 10 MMOL/L (ref 7–16)
AST SERPL-CCNC: 79 U/L (ref 0–39)
BILIRUB SERPL-MCNC: 0.5 MG/DL (ref 0–1.2)
BUN BLDV-MCNC: 57 MG/DL (ref 6–23)
CALCIUM SERPL-MCNC: 9.3 MG/DL (ref 8.6–10.2)
CHLORIDE BLD-SCNC: 104 MMOL/L (ref 98–107)
CO2: 21 MMOL/L (ref 22–29)
CREAT SERPL-MCNC: 1.4 MG/DL (ref 0.7–1.2)
GFR AFRICAN AMERICAN: 60
GFR NON-AFRICAN AMERICAN: 49 ML/MIN/1.73
GLUCOSE BLD-MCNC: 164 MG/DL (ref 74–99)
HCT VFR BLD CALC: 42.1 % (ref 37–54)
HEMOGLOBIN: 14.5 G/DL (ref 12.5–16.5)
MCH RBC QN AUTO: 33 PG (ref 26–35)
MCHC RBC AUTO-ENTMCNC: 34.4 % (ref 32–34.5)
MCV RBC AUTO: 95.7 FL (ref 80–99.9)
PDW BLD-RTO: 15 FL (ref 11.5–15)
PLATELET # BLD: 102 E9/L (ref 130–450)
PMV BLD AUTO: 11.6 FL (ref 7–12)
POTASSIUM SERPL-SCNC: 4.1 MMOL/L (ref 3.5–5)
RBC # BLD: 4.4 E12/L (ref 3.8–5.8)
SODIUM BLD-SCNC: 135 MMOL/L (ref 132–146)
TOTAL PROTEIN: 5.9 G/DL (ref 6.4–8.3)
WBC # BLD: 10.1 E9/L (ref 4.5–11.5)

## 2021-09-08 PROCEDURE — 36415 COLL VENOUS BLD VENIPUNCTURE: CPT

## 2021-09-08 PROCEDURE — 6370000000 HC RX 637 (ALT 250 FOR IP): Performed by: INTERNAL MEDICINE

## 2021-09-08 PROCEDURE — 85027 COMPLETE CBC AUTOMATED: CPT

## 2021-09-08 PROCEDURE — 94640 AIRWAY INHALATION TREATMENT: CPT

## 2021-09-08 PROCEDURE — 2500000003 HC RX 250 WO HCPCS: Performed by: STUDENT IN AN ORGANIZED HEALTH CARE EDUCATION/TRAINING PROGRAM

## 2021-09-08 PROCEDURE — 6360000002 HC RX W HCPCS: Performed by: INTERNAL MEDICINE

## 2021-09-08 PROCEDURE — 80053 COMPREHEN METABOLIC PANEL: CPT

## 2021-09-08 PROCEDURE — 99232 SBSQ HOSP IP/OBS MODERATE 35: CPT | Performed by: INTERNAL MEDICINE

## 2021-09-08 PROCEDURE — 2700000000 HC OXYGEN THERAPY PER DAY

## 2021-09-08 PROCEDURE — 2500000003 HC RX 250 WO HCPCS: Performed by: INTERNAL MEDICINE

## 2021-09-08 PROCEDURE — 2580000003 HC RX 258: Performed by: STUDENT IN AN ORGANIZED HEALTH CARE EDUCATION/TRAINING PROGRAM

## 2021-09-08 PROCEDURE — 2060000000 HC ICU INTERMEDIATE R&B

## 2021-09-08 RX ADMIN — Medication 6 MG: at 21:11

## 2021-09-08 RX ADMIN — BUDESONIDE AND FORMOTEROL FUMARATE DIHYDRATE 2 PUFF: 160; 4.5 AEROSOL RESPIRATORY (INHALATION) at 06:27

## 2021-09-08 RX ADMIN — LEVETIRACETAM 500 MG: 500 TABLET ORAL at 08:52

## 2021-09-08 RX ADMIN — ENOXAPARIN SODIUM 40 MG: 40 INJECTION SUBCUTANEOUS at 08:51

## 2021-09-08 RX ADMIN — BUDESONIDE AND FORMOTEROL FUMARATE DIHYDRATE 2 PUFF: 160; 4.5 AEROSOL RESPIRATORY (INHALATION) at 18:11

## 2021-09-08 RX ADMIN — ZINC SULFATE 220 MG (50 MG) CAPSULE 50 MG: CAPSULE at 08:51

## 2021-09-08 RX ADMIN — OXYCODONE HYDROCHLORIDE AND ACETAMINOPHEN 500 MG: 500 TABLET ORAL at 21:12

## 2021-09-08 RX ADMIN — Medication 2000 UNITS: at 08:52

## 2021-09-08 RX ADMIN — PROBENECID AND COLCHICINE 1 TABLET: 500; .5 TABLET ORAL at 08:52

## 2021-09-08 RX ADMIN — ENOXAPARIN SODIUM 40 MG: 40 INJECTION SUBCUTANEOUS at 21:10

## 2021-09-08 RX ADMIN — REMDESIVIR 100 MG: 100 INJECTION, POWDER, LYOPHILIZED, FOR SOLUTION INTRAVENOUS at 21:10

## 2021-09-08 RX ADMIN — METOPROLOL SUCCINATE 100 MG: 25 TABLET, EXTENDED RELEASE ORAL at 08:51

## 2021-09-08 RX ADMIN — DEXAMETHASONE SODIUM PHOSPHATE 10 MG: 10 INJECTION, SOLUTION INTRAMUSCULAR; INTRAVENOUS at 21:10

## 2021-09-08 RX ADMIN — METHIMAZOLE 7.5 MG: 5 TABLET ORAL at 08:52

## 2021-09-08 RX ADMIN — ATORVASTATIN CALCIUM 20 MG: 20 TABLET, FILM COATED ORAL at 21:13

## 2021-09-08 RX ADMIN — PYRIDOXINE HCL TAB 50 MG 50 MG: 50 TAB at 08:52

## 2021-09-08 RX ADMIN — LISINOPRIL 10 MG: 10 TABLET ORAL at 08:52

## 2021-09-08 RX ADMIN — OXYCODONE HYDROCHLORIDE AND ACETAMINOPHEN 500 MG: 500 TABLET ORAL at 08:52

## 2021-09-08 RX ADMIN — LEVETIRACETAM 500 MG: 500 TABLET ORAL at 21:11

## 2021-09-08 RX ADMIN — BARICITINIB 2 MG: 2 TABLET, FILM COATED ORAL at 13:35

## 2021-09-08 RX ADMIN — LORAZEPAM 1 MG: 1 TABLET ORAL at 21:13

## 2021-09-08 RX ADMIN — ALLOPURINOL 300 MG: 300 TABLET ORAL at 08:52

## 2021-09-08 RX ADMIN — DEXAMETHASONE SODIUM PHOSPHATE 10 MG: 10 INJECTION, SOLUTION INTRAMUSCULAR; INTRAVENOUS at 08:51

## 2021-09-08 RX ADMIN — BENZONATATE 100 MG: 100 CAPSULE ORAL at 08:51

## 2021-09-08 ASSESSMENT — PAIN SCALES - GENERAL
PAINLEVEL_OUTOF10: 0

## 2021-09-08 NOTE — PROGRESS NOTES
[address] [place], [place] [address]ist   Progress Note    Admitting Date and Time: 9/6/2021  8:43 AM  Admit Dx: Acute respiratory failure with hypoxia ([facility] [J96.01]  Acute respiratory failure due to COVID-19 ([facility] [U07.1, J96.00]    Subjective:    9/7: Patient states he is doing better today. He has been doing his breathing exercises. He has been trying to lay on his prone but cannot fully get to that position but does state rotate from side to side. 9/8: Patient sitting up in bed. Feels fine but did state his oxygen did drop when he got up to go to the bathroom 2 times today. He has been using his incentive spirometer and flutter valve    Per RN: Pt desat  To 84% on 3L NC while up to BR with assistance. Pt temporarily placed on 10L to recover once back in bed. Pt recovered to 90% within 2 minutes, pt then weaned back down to 5L NC with sats 89-91% at this time. Will continue to monitor.          baricitinib  2 mg Oral Daily    remdesivir IVPB  100 mg IntraVENous Q24H    allopurinol  300 mg Oral Daily    atorvastatin  20 mg Oral Nightly    colchicine-probenecid  1 tablet Oral Daily    metoprolol succinate  100 mg Oral Daily    methIMAzole  7.5 mg Oral Daily    lisinopril  10 mg Oral Daily    levETIRAcetam  500 mg Oral BID    enoxaparin  40 mg SubCUTAneous BID    dexamethasone  10 mg IntraVENous Q12H    Vitamin D  2,000 Units Oral Daily    vitamin B-6  50 mg Oral Daily    ascorbic acid  500 mg Oral BID    zinc sulfate  50 mg Oral Daily    budesonide-formoterol  2 puff Inhalation BID     melatonin, 6 mg, Nightly PRN  sodium chloride, 30 mL, PRN  benzonatate, 100 mg, TID PRN  butalbital-acetaminophen-caffeine, 1 tablet, Q4H PRN  LORazepam, 1 mg, Nightly PRN         Objective:    BP (!) 123/59   Pulse 70   Temp 97 °F (36.1 °C) (Infrared)   Resp 18   Ht 6' (1.829 m)   Wt 230 lb (104.3 kg)   SpO2 92%   BMI 31.19 kg/m²   General Appearance: alert and oriented to person, place and time and in no acute distress but does have some conversational dyspnea. Skin: warm and dry  Head: normocephalic and atraumatic  Eyes: pupils equal, round, and reactive to light, extraocular eye movements intact, conjunctivae normal  Neck: neck supple and non tender without mass   Pulmonary/Chest: diminished breath sounds with scattered wheezes.   Cardiovascular: normal rate, normal S1 and S2 and no carotid bruits  Abdomen: soft, non-tender, non-distended, normal bowel sounds, no masses or organomegaly  Extremities: no cyanosis, no clubbing and no edema  Neurologic: no cranial nerve deficit and speech normal       Recent Labs     09/06/21 0940 09/07/21  0931 09/08/21  0702    137 135   K 4.3 3.8 4.1    103 104   CO2 25 21* 21*   BUN 52* 54* 57*   CREATININE 1.7* 1.4* 1.4*   GLUCOSE 120* 214* 164*   CALCIUM 9.9 9.5 9.3       Recent Labs     09/06/21 0940 09/07/21  0931 09/08/21  0702   ALKPHOS 81 74 72   PROT 6.6 6.1* 5.9*   LABALBU 3.0* 2.8* 2.9*   BILITOT 0.8 0.4 0.5   AST 70* 82* 79*   ALT 63* 99* 119*       Recent Labs     09/06/21 0940 09/08/21  0702   WBC 15.1* 10.1   RBC 4.75 4.40   HGB 15.5 14.5   HCT 47.5 42.1   .0* 95.7   MCH 32.6 33.0   MCHC 32.6 34.4   RDW 15.9* 15.0    102*   MPV 11.9 11.6      SARS-COV-2 biomarkers    Recent Labs     09/06/21  0940 09/06/21  1414 09/07/21  0931 09/08/21  0702   CRP  --  7.4* 4.7*  --    PROCAL  --   --  0.25*  --    FERRITIN  --   --  1,223  --    LDH  --   --  252*  --    DDIMER  --   --  >5250  --    FIBRINOGEN  --   --  >700*  --    INR  --   --  1.3  --    PROTIME  --   --  15.2*  --    AST 70*  --  82* 79*   ALT 63*  --  99* 119*     Lab Results   Component Value Date    CHOL 168 08/04/2016    TRIG 187 08/04/2016    HDL 48 08/04/2016    LDLCALC 83 08/04/2016    LABVLDL 37 08/04/2016     No results found for: VITD25      Strep Pneumoniae Antigen [[id number]] Collected: 09/06/21 2118     Specimen: Urine voided Updated: 09/07/21 9855      STREP artery of native heart    Class 1 obesity due to excess calories without serious comorbidity with body mass index (BMI) of 31.0 to 31.9 in adult    Hyperthyroidism    SDH (subdural hematoma) (HCC)    Toxic multinodul goiter  Resolved Problems:    * No resolved hospital problems. *      Plan:    1. Acute hypoxic respiratory failure due to COVID-19  -Supplemental oxygen and wean as tolerated but needing 6-15 L already on admission; patient was counseled by nursing and myself about proning.  -Initiated with Decadron in the ER 6 mg but increased up to 10 mg every 12 hours per her new higher dose protocol  -Started with remdesivir day #3/5 treatment  -Placed on Symbicort 160/4.5 2 puffs bid rinse and spit to help with inspiratory effort; IS and PEP flutter valve.  -patient with mild lactic acidosis which improved without IVF. Lactic 3.9-->2.7  -Checked CRP to see if qualifies for tocilizumab but it came back at 7.4. Discussed with Pharm D and patient would be better candidate for baricitinib 2mg daily #2/14.  -Follow COVID labs. CRP trending down to 4.7 yesterday from 7.4.  -Vitamin regimen of B6, vitamin C, vitamin D and zinc  -Checked urinary antigens for strep pneumoniae and Legionella which were negative.  -Lovenox bid if okay with neurosurgery due to SDH in June. Got  CT head which came back showing smaller size of SDH. Discussed with neurosurgery this morning via [employer] and was okayed for anticoagulation but to closely monitor his platelets given his prior thrombocytopenia  -CT chest was also obtained Monday night and this came back showing moderate bilateral groundglass opacities consistent with Covid pneumonia     2. CKD stage 3  -Creatinine 1.7 on admission with prior baseline of 1.3-->1.6  -monitor closely. Creatinine last two days 1.4     3. Hyperthyroidism due to [address]  -patient follow with Dr. Gregory Gale.  -he is on methimazole 7.5 mg daily.        4.  Traumatic SDH 6/25/21 while on Xarelto  -follow with

## 2021-09-08 NOTE — ACP (ADVANCE CARE PLANNING)
Advance Care Planning     Advance Care Planning Activator (Inpatient)  Conversation Note      Date of ACP Conversation: 9/8/2021     Conversation Conducted with: Patient  ACP Activator: Legrand Sicard, RN    Health Care Decision Maker:     Current Designated Health Care Decision Maker:     Primary Decision Maker: Jo Watts Child - [phone]    Secondary Decision Maker: Irish Pandya Child - [phone]    Care Preferences    Ventilation: \"If you were in your present state of health and suddenly became very ill and were unable to breathe on your own, what would your preference be about the use of a ventilator (breathing machine) if it were available to you? \"      Would the patient desire the use of ventilator (breathing machine)?: yes    \"If your health worsens and it becomes clear that your chance of recovery is unlikely, what would your preference be about the use of a ventilator (breathing machine) if it were available to you? \"     Would the patient desire the use of ventilator (breathing machine)?: Yes      Resuscitation  \"CPR works best to restart the heart when there is a sudden event, like a heart attack, in someone who is otherwise healthy. Unfortunately, CPR does not typically restart the heart for people who have serious health conditions or who are very sick. \"    \"In the event your heart stopped as a result of an underlying serious health condition, would you want attempts to be made to restart your heart (answer \"yes\" for attempt to resuscitate) or would you prefer a natural death (answer \"no\" for do not attempt to resuscitate)? \" yes       [] Yes   [x] No   Educated Patient / Hortencia Cervantes regarding differences between Advance Directives and portable DNR orders.     Length of ACP Conversation in minutes:      Conversation Outcomes:  [x] ACP discussion completed  [] Existing advance directive reviewed with patient; no changes to patient's previously recorded wishes  [] New Advance Directive completed  [] Portable Do Not Rescitate prepared for Provider review and signature  [] POLST/POST/MOLST/MOST prepared for Provider review and signature      Follow-up plan:    [] Schedule follow-up conversation to continue planning  [] Referred individual to Provider for additional questions/concerns   [] Advised patient/agent/surrogate to review completed ACP document and update if needed with changes in condition, patient preferences or care setting    [x] This note routed to one or more involved healthcare providers

## 2021-09-08 NOTE — CARE COORDINATION
Patient/family seen: Yes       Informed patient/family of BPCI-A Medical Bundle Program with potential outreach by either Care Transitions Team or [facility] Team based on hospital admission and location. BPCI-A Notification Letter given: Yes         Current discharge plan: Home    Explained letter to patient and is in soft chart for discharge.  Electronically signed by Jorge Yoder on 9/8/2021 at 11:39 AM'

## 2021-09-08 NOTE — PROGRESS NOTES
Myranda courtney updated, all questions answered.  Electronically signed by Jerry Amado RN on 9/8/2021 at 3:01 PM

## 2021-09-08 NOTE — CARE COORDINATION
CM Note: 9/8/2021 at 9:39am: COVID POSITIVE - test done on 9/6. CM called and talked to the patient on the phone in his room for transition of care. States he lives with his wife and daughter in a ranch home with 3 steps to enter. States he is independent with his ADL's and was cleared to start driving again about a week ago. Currently on 3L NC - no hx of home oxygen or nebulizer. DME: states he has a [employer], BSC, cane, shower chair, but states he stopped using them weeks ago. Hx of HHC with [facility] - but not active currently. States no hx of SNF. PCP: Dr. Miguel Beach. [facility]acy: [facility] in [place]. States his plan is to return home when discharged and his family / daughter will provide transportation.  Nena Walker RN

## 2021-09-08 NOTE — PROGRESS NOTES
Pt desat  To 84% on 3L NC while up to BR with assistance. Pt temporarily placed on 10L to recover once back in bed. Pt recovered to 90% within 2 minutes, pt then weaned back down to 5L NC with sats 89-91% at this time. Will continue to monitor.

## 2021-09-09 LAB
ALBUMIN SERPL-MCNC: 3 G/DL (ref 3.5–5.2)
ALP BLD-CCNC: 72 U/L (ref 40–129)
ALT SERPL-CCNC: 195 U/L (ref 0–40)
ANION GAP SERPL CALCULATED.3IONS-SCNC: 11 MMOL/L (ref 7–16)
AST SERPL-CCNC: 126 U/L (ref 0–39)
BILIRUB SERPL-MCNC: 0.4 MG/DL (ref 0–1.2)
BUN BLDV-MCNC: 60 MG/DL (ref 6–23)
C-REACTIVE PROTEIN: 1.9 MG/DL (ref 0–0.4)
CALCIUM SERPL-MCNC: 9.7 MG/DL (ref 8.6–10.2)
CHLORIDE BLD-SCNC: 109 MMOL/L (ref 98–107)
CO2: 22 MMOL/L (ref 22–29)
CREAT SERPL-MCNC: 1.4 MG/DL (ref 0.7–1.2)
D DIMER: >5250 NG/ML DDU
FERRITIN: 1207 NG/ML
FIBRINOGEN: 617 MG/DL (ref 225–540)
GFR AFRICAN AMERICAN: 60
GFR NON-AFRICAN AMERICAN: 49 ML/MIN/1.73
GLUCOSE BLD-MCNC: 164 MG/DL (ref 74–99)
HCT VFR BLD CALC: 45.6 % (ref 37–54)
HEMOGLOBIN: 15.2 G/DL (ref 12.5–16.5)
INR BLD: 1.3
LACTATE DEHYDROGENASE: 286 U/L (ref 135–225)
MCH RBC QN AUTO: 33 PG (ref 26–35)
MCHC RBC AUTO-ENTMCNC: 33.3 % (ref 32–34.5)
MCV RBC AUTO: 98.9 FL (ref 80–99.9)
PDW BLD-RTO: 15.1 FL (ref 11.5–15)
PLATELET # BLD: 103 E9/L (ref 130–450)
PMV BLD AUTO: 11.7 FL (ref 7–12)
POTASSIUM SERPL-SCNC: 4.4 MMOL/L (ref 3.5–5)
PROCALCITONIN: 0.12 NG/ML (ref 0–0.08)
PROTHROMBIN TIME: 14.7 SEC (ref 9.3–12.4)
RBC # BLD: 4.61 E12/L (ref 3.8–5.8)
SODIUM BLD-SCNC: 142 MMOL/L (ref 132–146)
TOTAL PROTEIN: 6 G/DL (ref 6.4–8.3)
TROPONIN, HIGH SENSITIVITY: 45 NG/L (ref 0–11)
WBC # BLD: 12.6 E9/L (ref 4.5–11.5)

## 2021-09-09 PROCEDURE — 85378 FIBRIN DEGRADE SEMIQUANT: CPT

## 2021-09-09 PROCEDURE — 83615 LACTATE (LD) (LDH) ENZYME: CPT

## 2021-09-09 PROCEDURE — 36415 COLL VENOUS BLD VENIPUNCTURE: CPT

## 2021-09-09 PROCEDURE — 6360000002 HC RX W HCPCS: Performed by: INTERNAL MEDICINE

## 2021-09-09 PROCEDURE — 84145 PROCALCITONIN (PCT): CPT

## 2021-09-09 PROCEDURE — 85610 PROTHROMBIN TIME: CPT

## 2021-09-09 PROCEDURE — 2500000003 HC RX 250 WO HCPCS: Performed by: INTERNAL MEDICINE

## 2021-09-09 PROCEDURE — 94640 AIRWAY INHALATION TREATMENT: CPT

## 2021-09-09 PROCEDURE — 86140 C-REACTIVE PROTEIN: CPT

## 2021-09-09 PROCEDURE — 84484 ASSAY OF TROPONIN QUANT: CPT

## 2021-09-09 PROCEDURE — 2060000000 HC ICU INTERMEDIATE R&B

## 2021-09-09 PROCEDURE — 85384 FIBRINOGEN ACTIVITY: CPT

## 2021-09-09 PROCEDURE — 6370000000 HC RX 637 (ALT 250 FOR IP): Performed by: INTERNAL MEDICINE

## 2021-09-09 PROCEDURE — 99232 SBSQ HOSP IP/OBS MODERATE 35: CPT | Performed by: INTERNAL MEDICINE

## 2021-09-09 PROCEDURE — 80053 COMPREHEN METABOLIC PANEL: CPT

## 2021-09-09 PROCEDURE — 85027 COMPLETE CBC AUTOMATED: CPT

## 2021-09-09 PROCEDURE — 82728 ASSAY OF FERRITIN: CPT

## 2021-09-09 RX ADMIN — OXYCODONE HYDROCHLORIDE AND ACETAMINOPHEN 500 MG: 500 TABLET ORAL at 08:07

## 2021-09-09 RX ADMIN — Medication 6 MG: at 23:21

## 2021-09-09 RX ADMIN — BUDESONIDE AND FORMOTEROL FUMARATE DIHYDRATE 2 PUFF: 160; 4.5 AEROSOL RESPIRATORY (INHALATION) at 06:14

## 2021-09-09 RX ADMIN — ATORVASTATIN CALCIUM 20 MG: 20 TABLET, FILM COATED ORAL at 20:31

## 2021-09-09 RX ADMIN — METOPROLOL SUCCINATE 100 MG: 25 TABLET, EXTENDED RELEASE ORAL at 08:06

## 2021-09-09 RX ADMIN — PYRIDOXINE HCL TAB 50 MG 50 MG: 50 TAB at 08:06

## 2021-09-09 RX ADMIN — LORAZEPAM 1 MG: 1 TABLET ORAL at 23:22

## 2021-09-09 RX ADMIN — ALLOPURINOL 300 MG: 300 TABLET ORAL at 08:07

## 2021-09-09 RX ADMIN — DEXAMETHASONE SODIUM PHOSPHATE 10 MG: 10 INJECTION, SOLUTION INTRAMUSCULAR; INTRAVENOUS at 20:33

## 2021-09-09 RX ADMIN — OXYCODONE HYDROCHLORIDE AND ACETAMINOPHEN 500 MG: 500 TABLET ORAL at 20:31

## 2021-09-09 RX ADMIN — DEXAMETHASONE SODIUM PHOSPHATE 10 MG: 10 INJECTION, SOLUTION INTRAMUSCULAR; INTRAVENOUS at 08:07

## 2021-09-09 RX ADMIN — Medication 2000 UNITS: at 08:07

## 2021-09-09 RX ADMIN — ENOXAPARIN SODIUM 40 MG: 40 INJECTION SUBCUTANEOUS at 08:06

## 2021-09-09 RX ADMIN — LISINOPRIL 10 MG: 10 TABLET ORAL at 08:07

## 2021-09-09 RX ADMIN — LEVETIRACETAM 500 MG: 500 TABLET ORAL at 20:31

## 2021-09-09 RX ADMIN — BENZONATATE 100 MG: 100 CAPSULE ORAL at 08:07

## 2021-09-09 RX ADMIN — METHIMAZOLE 7.5 MG: 5 TABLET ORAL at 08:07

## 2021-09-09 RX ADMIN — BUDESONIDE AND FORMOTEROL FUMARATE DIHYDRATE 2 PUFF: 160; 4.5 AEROSOL RESPIRATORY (INHALATION) at 19:22

## 2021-09-09 RX ADMIN — LEVETIRACETAM 500 MG: 500 TABLET ORAL at 08:07

## 2021-09-09 RX ADMIN — ENOXAPARIN SODIUM 40 MG: 40 INJECTION SUBCUTANEOUS at 20:32

## 2021-09-09 RX ADMIN — BARICITINIB 2 MG: 2 TABLET, FILM COATED ORAL at 11:29

## 2021-09-09 RX ADMIN — PROBENECID AND COLCHICINE 1 TABLET: 500; .5 TABLET ORAL at 08:07

## 2021-09-09 RX ADMIN — ZINC SULFATE 220 MG (50 MG) CAPSULE 50 MG: CAPSULE at 08:07

## 2021-09-09 ASSESSMENT — PAIN SCALES - GENERAL
PAINLEVEL_OUTOF10: 0

## 2021-09-09 NOTE — PROGRESS NOTES
[address] [place], [place] [address]   Progress Note    Admitting Date and Time: 9/6/2021  8:43 AM  Admit Dx: Acute respiratory failure with hypoxia ([facility] [J96.01]  [facility] [U07.1, J96.00]    Subjective:    9/7: Patient states he is doing better today. He has been doing his breathing exercises. He has been trying to lay on his prone but cannot fully get to that position but does state rotate from side to side. 9/8: Patient sitting up in bed. Feels fine but did state his oxygen did drop when he got up to go to the bathroom 2 times today. He has been using his incentive spirometer and flutter valve. 9/9: Patient is sitting up in bed. He is happy he is down to 2L but does add he gets down to mid 80's when he gets up to go to the bathroom. Per RN:   LFTs are up as so pharmacy recommended stopping remdesivir.        [Held by provider] baricitinib  2 mg Oral Daily    allopurinol  300 mg Oral Daily    atorvastatin  20 mg Oral Nightly    colchicine-probenecid  1 tablet Oral Daily    metoprolol succinate  100 mg Oral Daily    methIMAzole  7.5 mg Oral Daily    lisinopril  10 mg Oral Daily    levETIRAcetam  500 mg Oral BID    enoxaparin  40 mg SubCUTAneous BID    dexamethasone  10 mg IntraVENous Q12H    Vitamin D  2,000 Units Oral Daily    vitamin B-6  50 mg Oral Daily    ascorbic acid  500 mg Oral BID    zinc sulfate  50 mg Oral Daily    budesonide-formoterol  2 puff Inhalation BID     melatonin, 6 mg, Nightly PRN  sodium chloride, 30 mL, PRN  benzonatate, 100 mg, TID PRN  butalbital-acetaminophen-caffeine, 1 tablet, Q4H PRN  LORazepam, 1 mg, Nightly PRN         Objective:    BP (!) 106/56   Pulse 59   Temp 97 °F (36.1 °C) (Infrared)   Resp 16   Ht 6' (1.829 m)   Wt 230 lb (104.3 kg)   SpO2 98%   BMI 31.19 kg/m²   General Appearance: alert and oriented to person, place and time and in no acute distress but does have some conversational dyspnea. Skin: warm and dry  Head: normocephalic and atraumatic  Eyes: pupils equal, round, and reactive to light, extraocular eye movements intact, conjunctivae normal  Neck: neck supple and non tender without mass   Pulmonary/Chest: diminished breath sounds with scattered wheezes. Cardiovascular: normal rate, normal S1 and S2 and no carotid bruits  Abdomen: soft, non-tender, non-distended, normal bowel sounds, no masses or organomegaly  Extremities: no cyanosis, no clubbing and no edema  Neurologic: no cranial nerve deficit and speech normal       Recent Labs     09/07/21 0931 09/08/21  0702 09/09/21  0520    135 142   K 3.8 4.1 4.4    104 109*   CO2 21* 21* 22   BUN 54* 57* 60*   CREATININE 1.4* 1.4* 1.4*   GLUCOSE 214* 164* 164*   CALCIUM 9.5 9.3 9.7       Recent Labs     09/07/21 0931 09/08/21 0702 09/09/21  0520   ALKPHOS 74 72 72   PROT 6.1* 5.9* 6.0*   LABALBU 2.8* 2.9* 3.0*   BILITOT 0.4 0.5 0.4   AST 82* 79* 126*   ALT 99* 119* 195*       Recent Labs     09/08/21 0702 09/09/21  0520   WBC 10.1 12.6*   RBC 4.40 4.61   HGB 14.5 15.2   HCT 42.1 45.6   MCV 95.7 98.9   MCH 33.0 33.0   MCHC 34.4 33.3   RDW 15.0 15.1*   * 103*   MPV 11.6 11.7      SARS-COV-2 biomarkers    Recent Labs     09/07/21 0931 09/08/21  0702 09/09/21  0520   CRP 4.7*  --  1.9*   PROCAL 0.25*  --  0.12*   FERRITIN 1,223  --  1,207   *  --  286*   DDIMER >5250  --  >5250   FIBRINOGEN >700*  --  617*   INR 1.3  --  1.3   PROTIME 15.2*  --  14.7*   AST 82* 79* 126*   ALT 99* 119* 195*     Lab Results   Component Value Date    CHOL 168 08/04/2016    TRIG 187 08/04/2016    HDL 48 08/04/2016    LDLCALC 83 08/04/2016    LABVLDL 37 08/04/2016     No results found for: VITD25      Strep Pneumoniae Antigen [[id number]] Collected: 09/06/21 2118     Specimen: Urine voided Updated: 09/07/21 7475      STREP PNEUMONIAE ANTIGEN, URINE --      Presumptive negative- suggests no current or recent   pneumococcal infection. Infection due to Strep pneumoniae cannot be   ruled out since the antigen present in the sample   may be below the detection limit of the test.   Normal Range:Presumptive Negative      Narrative:       Source: [facility]       Site:                LEGIONELLA ANTIGEN, URINE [[id number]] Collected: 09/06/21 2118     Specimen: Urine voided Updated: 09/07/21 0950      L. pneumophila Serogp 1 Ur Ag --      Presumptive Negative -suggesting no recent or current infections   with Legionella pneumophila serogroup 1. Infection to Legionella cannot be ruled out since other serogroups   and species may cause infection, antigen may not be present in   early infection, or level of antigen may be below the   detection limit. Normal Range: Presumptive Negative      Narrative:       Source: [facility]       Site:       Radiology:   CT HEAD WO CONTRAST   Final Result   1. Decreased size of the small right subdural hemorrhage      2. Stable left arachnoid cyst      3. No signs of an acute intracranial lesion      4. 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 Other chronic appearing findings. Continued follow-up recommended. XR CHEST PORTABLE   Final Result   Slightly worsened infiltrates may reflect increase edema and/or atelectasis. Differential includes worsened multifocal pneumonia, bacterial versus viral.   While nonspecific, this appearance can be seen with slight worsening of   COVID-19 pneumonia, given patient history.              Assessment:  Principal Problem:    Acute hypoxemic respiratory failure due to COVID-19 [facility])  Active Problems:    CKD (chronic kidney disease) stage 3, GFR 30-59 ml/min    Coronary artery disease involving native coronary artery of native heart    Class 1 obesity due to excess calories without serious comorbidity with body mass index (BMI) of 31.0 to 31.9 in adult    Hyperthyroidism    SDH (subdural hematoma) (HCC)    Toxic multinodul goiter  Resolved Problems:    * No resolved hospital problems. *      Plan:    1. Acute hypoxic respiratory failure due to COVID-19  -Supplemental oxygen and wean as tolerated but needing 6-15 L already on admission; patient was counseled by nursing and myself about proning. Weaned down to 2L. Home oxygen evaluation in the morning  -Initiated with Decadron in the ER 6 mg but increased up to 10 mg every 12 hours per her new higher dose protocol  -Started with remdesivir day #3/5 completed 9/8. Hold remdesivir today as LFTs are up.  -Placed on Symbicort 160/4.5 2 puffs bid rinse and spit to help with inspiratory effort; IS and PEP flutter valve.  -patient with mild lactic acidosis which improved without IVF. Lactic 3.9-->2.7  -Checked CRP to see if qualifies for tocilizumab but it came back at 7.4. Discussed with Pharm D and patient would be better candidate for baricitinib 2mg daily #3/14 but will put on hold until we ensure LFTs stabilize or improve tomorrow  -Follow COVID labs. CRP trending down 7.4--> 4.7-->1.9.  -Vitamin regimen of B6, vitamin C, vitamin D and zinc  -Checked urinary antigens for strep pneumoniae and Legionella which were negative.  -Lovenox bid if okay with neurosurgery due to SDH in June. Got  CT head which came back showing smaller size of SDH. Discussed with neurosurgery Tues morning via [employer] and was okayed for anticoagulation but to closely monitor his platelets given his prior thrombocytopenia  -CT chest was also obtained Monday night and this came back showing moderate bilateral groundglass opacities consistent with Covid pneumonia     2. CKD stage 3  -Creatinine 1.7 on admission with prior baseline of 1.3-->1.6  -monitor closely. Creatinine last 3 days 1.4     3. Hyperthyroidism due to [address]  -patient follow with Dr. Fanny Armas.  -he is on methimazole 7.5 mg daily.        4.  Traumatic SDH 6/25/21 while on Xarelto  -follow with Dr. Brian Davison of neurosurgery. He was due for follow up CT head 9/15 but will go ahead and obtain CT head now to see if stable and then get clearance for Lovenox.  -Neurosurgery consulted for anticoagulation clearance. Okay to place on Lovenox as detailed above.  -Continue Keppra for seizure prophylaxis.     5. CAD  -Trop HS is elevated at 83 but this is lower than ER visit 9/4  -EKG without acute ischemic changes.     6. Elevated LFTs  - spoke pharm D today and concern that it is related to remdesivir and/or baricitinib. We decided to stop remdesivir and place the other on hold until we get morning labs. 7. Disposition  -home 24-48 hours. Home O2 study in the morning. NOTE: This report was transcribed using voice recognition software. Every effort was made to ensure accuracy; however, inadvertent computerized transcription errors may be present.      Electronically signed by Kelly Torres MD on 9/9/2021 at 7:28 PM

## 2021-09-09 NOTE — CARE COORDINATION
CM Note: 9/9/2021 at 11:39am: COVID POSITIVE - test done on 9/6. Mr. Christian Elder lives with his wife and daughter in a ranch home with 3 steps to enter. He relayed he was independent with his ADL's and was cleared to start driving again about a week ago. States his plan is to return home when discharged and his family / daughter will provide transportation. Currently on 3L NC - no home O2. Will need to watch for home O2 and would need a documented ambulatory pulse ox test prior to his discharge to see if he qualifies for home oxygen.  Tammie Whitfield RN

## 2021-09-09 NOTE — PROGRESS NOTES
Daughter roz updated, all questions answered. Daughter is concerned about d/c home because whole house is covid +. I explained he has 2 more doses of remdesivir left and prior to discharge nursing will ambulate patient to ensure pt able to ambulate at home and to determine home 02 needs. roz appreciative of information.

## 2021-09-10 LAB
ALBUMIN SERPL-MCNC: 3.1 G/DL (ref 3.5–5.2)
ALP BLD-CCNC: 71 U/L (ref 40–129)
ALT SERPL-CCNC: 245 U/L (ref 0–40)
ANION GAP SERPL CALCULATED.3IONS-SCNC: 10 MMOL/L (ref 7–16)
AST SERPL-CCNC: 133 U/L (ref 0–39)
BILIRUB SERPL-MCNC: 0.5 MG/DL (ref 0–1.2)
BUN BLDV-MCNC: 58 MG/DL (ref 6–23)
CALCIUM SERPL-MCNC: 9.6 MG/DL (ref 8.6–10.2)
CHLORIDE BLD-SCNC: 106 MMOL/L (ref 98–107)
CO2: 23 MMOL/L (ref 22–29)
CREAT SERPL-MCNC: 1.4 MG/DL (ref 0.7–1.2)
GFR AFRICAN AMERICAN: 60
GFR NON-AFRICAN AMERICAN: 49 ML/MIN/1.73
GLUCOSE BLD-MCNC: 164 MG/DL (ref 74–99)
HCT VFR BLD CALC: 45.8 % (ref 37–54)
HEMOGLOBIN: 15.5 G/DL (ref 12.5–16.5)
MCH RBC QN AUTO: 32.7 PG (ref 26–35)
MCHC RBC AUTO-ENTMCNC: 33.8 % (ref 32–34.5)
MCV RBC AUTO: 96.6 FL (ref 80–99.9)
PDW BLD-RTO: 15 FL (ref 11.5–15)
PLATELET # BLD: 104 E9/L (ref 130–450)
PMV BLD AUTO: 11.8 FL (ref 7–12)
POTASSIUM SERPL-SCNC: 4.7 MMOL/L (ref 3.5–5)
RBC # BLD: 4.74 E12/L (ref 3.8–5.8)
SODIUM BLD-SCNC: 139 MMOL/L (ref 132–146)
TOTAL PROTEIN: 5.8 G/DL (ref 6.4–8.3)
WBC # BLD: 15.7 E9/L (ref 4.5–11.5)

## 2021-09-10 PROCEDURE — 85027 COMPLETE CBC AUTOMATED: CPT

## 2021-09-10 PROCEDURE — 2060000000 HC ICU INTERMEDIATE R&B

## 2021-09-10 PROCEDURE — 80053 COMPREHEN METABOLIC PANEL: CPT

## 2021-09-10 PROCEDURE — 2700000000 HC OXYGEN THERAPY PER DAY

## 2021-09-10 PROCEDURE — 94640 AIRWAY INHALATION TREATMENT: CPT

## 2021-09-10 PROCEDURE — 36415 COLL VENOUS BLD VENIPUNCTURE: CPT

## 2021-09-10 PROCEDURE — 99232 SBSQ HOSP IP/OBS MODERATE 35: CPT | Performed by: INTERNAL MEDICINE

## 2021-09-10 PROCEDURE — 6360000002 HC RX W HCPCS: Performed by: INTERNAL MEDICINE

## 2021-09-10 PROCEDURE — 6370000000 HC RX 637 (ALT 250 FOR IP): Performed by: INTERNAL MEDICINE

## 2021-09-10 RX ORDER — DEXAMETHASONE SODIUM PHOSPHATE 10 MG/ML
10 INJECTION INTRAMUSCULAR; INTRAVENOUS EVERY 12 HOURS
Status: DISCONTINUED | OUTPATIENT
Start: 2021-09-10 | End: 2021-09-11 | Stop reason: HOSPADM

## 2021-09-10 RX ADMIN — METHIMAZOLE 7.5 MG: 5 TABLET ORAL at 10:50

## 2021-09-10 RX ADMIN — BUDESONIDE AND FORMOTEROL FUMARATE DIHYDRATE 2 PUFF: 160; 4.5 AEROSOL RESPIRATORY (INHALATION) at 17:18

## 2021-09-10 RX ADMIN — OXYCODONE HYDROCHLORIDE AND ACETAMINOPHEN 500 MG: 500 TABLET ORAL at 08:41

## 2021-09-10 RX ADMIN — METOPROLOL SUCCINATE 100 MG: 25 TABLET, EXTENDED RELEASE ORAL at 08:41

## 2021-09-10 RX ADMIN — Medication 6 MG: at 23:06

## 2021-09-10 RX ADMIN — Medication 2000 UNITS: at 08:42

## 2021-09-10 RX ADMIN — PYRIDOXINE HCL TAB 50 MG 50 MG: 50 TAB at 08:42

## 2021-09-10 RX ADMIN — LEVETIRACETAM 500 MG: 500 TABLET ORAL at 08:41

## 2021-09-10 RX ADMIN — ZINC SULFATE 220 MG (50 MG) CAPSULE 50 MG: CAPSULE at 08:41

## 2021-09-10 RX ADMIN — ENOXAPARIN SODIUM 40 MG: 40 INJECTION SUBCUTANEOUS at 08:41

## 2021-09-10 RX ADMIN — PROBENECID AND COLCHICINE 1 TABLET: 500; .5 TABLET ORAL at 10:50

## 2021-09-10 RX ADMIN — ATORVASTATIN CALCIUM 20 MG: 20 TABLET, FILM COATED ORAL at 20:21

## 2021-09-10 RX ADMIN — DEXAMETHASONE SODIUM PHOSPHATE 10 MG: 10 INJECTION, SOLUTION INTRAMUSCULAR; INTRAVENOUS at 08:42

## 2021-09-10 RX ADMIN — BUDESONIDE AND FORMOTEROL FUMARATE DIHYDRATE 2 PUFF: 160; 4.5 AEROSOL RESPIRATORY (INHALATION) at 08:14

## 2021-09-10 RX ADMIN — ALLOPURINOL 300 MG: 300 TABLET ORAL at 08:41

## 2021-09-10 RX ADMIN — OXYCODONE HYDROCHLORIDE AND ACETAMINOPHEN 500 MG: 500 TABLET ORAL at 20:21

## 2021-09-10 RX ADMIN — LISINOPRIL 10 MG: 10 TABLET ORAL at 08:46

## 2021-09-10 RX ADMIN — ENOXAPARIN SODIUM 40 MG: 40 INJECTION SUBCUTANEOUS at 20:22

## 2021-09-10 RX ADMIN — DEXAMETHASONE SODIUM PHOSPHATE 10 MG: 10 INJECTION INTRAMUSCULAR; INTRAVENOUS at 20:20

## 2021-09-10 RX ADMIN — LEVETIRACETAM 500 MG: 500 TABLET ORAL at 20:21

## 2021-09-10 RX ADMIN — LORAZEPAM 1 MG: 1 TABLET ORAL at 23:06

## 2021-09-10 ASSESSMENT — PAIN SCALES - GENERAL
PAINLEVEL_OUTOF10: 0

## 2021-09-10 NOTE — PROGRESS NOTES
[address] [place], [place] [address]ist   Progress Note    Admitting Date and Time: 9/6/2021  8:43 AM  Admit Dx: Acute respiratory failure with hypoxia ([facility] [address] [J96.01]  [facility] [U07.1, J96.00]    Subjective:    9/7: Patient states he is doing better today. He has been doing his breathing exercises. He has been trying to lay on his prone but cannot fully get to that position but does state rotate from side to side. 9/8: Patient sitting up in bed. Feels fine but did state his oxygen did drop when he got up to go to the bathroom 2 times today. He has been using his incentive spirometer and flutter valve. 9/9: Patient is sitting up in bed. He is happy he is down to 2L but does add he gets down to mid 80's when he gets up to go to the bathroom. 9/10: Patient is somewhat discouraged that her oxygen levels dropped so much when he ambulated today. He qualifies for home oxygen but we cannot provide the level of oxygen needs with ambulation at this point.     Per RN:   Home O2 study as follows; SpO2 92% on room air at rest- patient ambullated approx 75 feet on O2 6L SpO2 84%- Patient returned to bed in prone position and O2 2L at this time       dexamethasone  10 mg IntraVENous Q12H    [Held by provider] baricitinib  2 mg Oral Daily    allopurinol  300 mg Oral Daily    atorvastatin  20 mg Oral Nightly    colchicine-probenecid  1 tablet Oral Daily    metoprolol succinate  100 mg Oral Daily    methIMAzole  7.5 mg Oral Daily    lisinopril  10 mg Oral Daily    levETIRAcetam  500 mg Oral BID    enoxaparin  40 mg SubCUTAneous BID    Vitamin D  2,000 Units Oral Daily    vitamin B-6  50 mg Oral Daily    ascorbic acid  500 mg Oral BID    zinc sulfate  50 mg Oral Daily    budesonide-formoterol  2 puff Inhalation BID     melatonin, 6 mg, Nightly PRN  sodium chloride, 30 mL, PRN  benzonatate, 100 mg, TID PRN  butalbital-acetaminophen-caffeine, 1 tablet, Q4H PRN  LORazepam, 1 mg, Nightly PRN         Objective:    BP (!) 119/93   Pulse 64   Temp 97.1 °F (36.2 °C) (Infrared)   Resp 16   Ht 6' (1.829 m)   Wt 230 lb (104.3 kg)   SpO2 94%   BMI 31.19 kg/m²   General Appearance: alert and oriented to person, place and time and in no acute distress but does have some conversational dyspnea. Skin: warm and dry  Head: normocephalic and atraumatic  Eyes: pupils equal, round, and reactive to light, extraocular eye movements intact, conjunctivae normal  Neck: neck supple and non tender without mass   Pulmonary/Chest: diminished breath sounds with scattered wheezes.   Cardiovascular: normal rate, normal S1 and S2 and no carotid bruits  Abdomen: soft, non-tender, non-distended, normal bowel sounds, no masses or organomegaly  Extremities: no cyanosis, no clubbing and no edema  Neurologic: no cranial nerve deficit and speech normal       Recent Labs     09/08/21  0702 09/09/21  0520 09/10/21  0628    142 139   K 4.1 4.4 4.7    109* 106   CO2 21* 22 23   BUN 57* 60* 58*   CREATININE 1.4* 1.4* 1.4*   GLUCOSE 164* 164* 164*   CALCIUM 9.3 9.7 9.6       Recent Labs     09/08/21  0702 09/09/21  0520 09/10/21  0628   ALKPHOS 72 72 71   PROT 5.9* 6.0* 5.8*   LABALBU 2.9* 3.0* 3.1*   BILITOT 0.5 0.4 0.5   AST 79* 126* 133*   * 195* 245*       Recent Labs     09/08/21  0702 09/09/21  0520 09/10/21  0628   WBC 10.1 12.6* 15.7*   RBC 4.40 4.61 4.74   HGB 14.5 15.2 15.5   HCT 42.1 45.6 45.8   MCV 95.7 98.9 96.6   MCH 33.0 33.0 32.7   MCHC 34.4 33.3 33.8   RDW 15.0 15.1* 15.0   * 103* 104*   MPV 11.6 11.7 11.8      SARS-COV-2 biomarkers    Recent Labs     09/08/21  0702 09/09/21  0520 09/10/21  0628   CRP  --  1.9*  --    PROCAL  --  0.12*  --    FERRITIN  --  1,207  --    LDH  --  286*  --    DDIMER  --  >5250  --    FIBRINOGEN  --  617*  --    INR  --  1.3  --    PROTIME  --  14.7*  --    AST 79* 126* 133*   * 195* 245*     Lab Results   Component Value Date CHOL 168 08/04/2016    TRIG 187 08/04/2016    HDL 48 08/04/2016    LDLCALC 83 08/04/2016    LABVLDL 37 08/04/2016     No results found for: VITD25      Strep Pneumoniae Antigen [[id number]] Collected: 09/06/21 2118     Specimen: Urine voided Updated: 09/07/21 9316      STREP PNEUMONIAE ANTIGEN, URINE --      Presumptive negative- suggests no current or recent   pneumococcal infection. Infection due to Strep pneumoniae cannot be   ruled out since the antigen present in the sample   may be below the detection limit of the test.   Normal Range:Presumptive Negative      Narrative:       Source: [facility]RV       Site:                LEGIONELLA ANTIGEN, URINE [[id number]] Collected: 09/06/21 2118     Specimen: Urine voided Updated: 09/07/21 0950      L. pneumophila Serogp 1 Ur Ag --      Presumptive Negative -suggesting no recent or current infections   with Legionella pneumophila serogroup 1. Infection to Legionella cannot be ruled out since other serogroups   and species may cause infection, antigen may not be present in   early infection, or level of antigen may be below the   detection limit. Normal Range: Presumptive Negative      Narrative:       Source: URV       Site:       Radiology:   CT HEAD WO CONTRAST   Final Result   1. Decreased size of the small right subdural hemorrhage      2. Stable left arachnoid cyst      3. No signs of an acute intracranial lesion      4. 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 Other chronic appearing findings. Continued follow-up recommended. XR CHEST PORTABLE   Final Result   Slightly worsened infiltrates may reflect increase edema and/or atelectasis.    Differential includes worsened multifocal pneumonia, bacterial versus viral.   While nonspecific, this appearance can be seen with slight worsening of COVID-19 pneumonia, given patient history. Assessment:  Principal Problem:    Acute hypoxemic respiratory failure due to COVID-19 [facility])  Active Problems:    CKD (chronic kidney disease) stage 3, GFR 30-59 ml/min    Coronary artery disease involving native coronary artery of native heart    Class 1 obesity due to excess calories without serious comorbidity with body mass index (BMI) of 31.0 to 31.9 in adult    Hyperthyroidism    SDH (subdural hematoma) (HCC)    Toxic multinodul goiter  Resolved Problems:    * No resolved hospital problems. *      Plan:    1. Acute hypoxic respiratory failure due to COVID-19  -Supplemental oxygen and wean as tolerated but needing 6-15 L already on admission; patient was counseled by nursing and myself about proning. Weaned down to 2L. Home oxygen evaluation this morning indicates patient requires more than 6 L with ambulation.   -Initiated with Decadron in the ER 6 mg but increased up to 10 mg every 12 hours per her new higher dose protocol day #5 treatment  -Started with remdesivir day #3/5 completed 9/8. Remdesivir held yesterday due to elevated LFTs and discontinued altogether today. .  -Placed on Symbicort 160/4.5 2 puffs bid rinse and spit to help with inspiratory effort; IS and PEP flutter valve.  -patient with mild lactic acidosis which improved without IVF. Lactic 3.9-->2.7  -Checked CRP to see if qualifies for tocilizumab but it came back at 7.4. Discussed with Pharm D and patient would be better candidate for baricitinib 2mg daily #3/14 but was put on hold after dose yesterday given elevated LFTs. LFTs are actually worse today so cannot resume treatment for now  -Follow COVID labs. CRP trending down 7.4--> 4.7-->1.9 yesterday. Recheck in AM.  -Vitamin regimen of B6, vitamin C, vitamin D and zinc  -Checked urinary antigens for strep pneumoniae and Legionella which were negative.  -Lovenox bid if okay with neurosurgery due to SDH in June.  Got  CT head which came back showing smaller size of SDH. Discussed with neurosurgery Tues morning via [employer] and was okayed for anticoagulation but to closely monitor his platelets given his prior thrombocytopenia. Platelet count has been in the low 100 range  -CT chest was also obtained Monday night and this came back showing moderate bilateral groundglass opacities consistent with Covid pneumonia  -Check chest x-ray in the morning.     2. CKD stage 3  -Creatinine 1.7 on admission with prior baseline of 1.3-->1.6  -monitor closely. Creatinine last 3 days 1.4     3. Hyperthyroidism due to [address]  -patient follow with Dr. Jerzy Goldman.  -he is on methimazole 7.5 mg daily.        4. Traumatic SDH 6/25/21 while on Xarelto  -follow with Dr. Gómez Gross of neurosurgery. He was due for follow up CT head 9/15 but will go ahead and obtain CT head now to see if stable and then get clearance for Lovenox.  -Neurosurgery consulted for anticoagulation clearance. Okay to place on Lovenox as detailed above.  -Continue Keppra for seizure prophylaxis.     5. CAD  -Trop HS is elevated at 83 but this is lower than ER visit 9/4  -EKG without acute ischemic changes.     6. Elevated LFTs  - spoke pharm D on Thursday and concern that it is related to remdesivir and/or baricitinib. We decided to stop remdesivir and place the other on hold until we get morning labs. 7. Disposition  -Discharge delayed today because oxygen requirements with ambulation are too high for discharge. Anticipate will improve in the next 24 to 48 hours will ultimately need oxygen therapy for discharge. NOTE: This report was transcribed using voice recognition software. Every effort was made to ensure accuracy; however, inadvertent computerized transcription errors may be present.      Electronically signed by Joel Addison MD on 9/10/2021 at 5:57 PM

## 2021-09-10 NOTE — PROGRESS NOTES
SpO2 92% on room air at rest- patient ambullated approx 75 feet on O2 6L SpO2 84%- Patient returned to bed in prone position and O2 2L at this time

## 2021-09-10 NOTE — CARE COORDINATION
CM Note: 9/10/2021 at 2pm: COVID POSITIVE - test done on 9/6. The patient states he lives with his wife and daughter in a ranch home with 3 steps to enter. He relayed he was independent with  His ADL's and was clear to start driving again about a week ago. He states his plan is to return home when discharged and his family would provide transportation. Patient is currently on 2L NC - no home O2. Discussed with patient the possibility of going home with home oxygen and DME companies were reviewed. He selected [employer] IF he qualifies for home O2. CM called referral to Balwinder Alberto at [phone] [employer] and she states they will follow along. [address] / [place] [place] will need to call [phone] [employer] if patient qualifies for home O2. Will need a documented  ambulatory pulse ox test and if qualifies, would need orders.  Mayito Mason RN

## 2021-09-11 ENCOUNTER — APPOINTMENT (OUTPATIENT)
Dept: GENERAL RADIOLOGY | Age: 76
DRG: 177 | End: 2021-09-11
Payer: MEDICARE

## 2021-09-11 VITALS
SYSTOLIC BLOOD PRESSURE: 104 MMHG | TEMPERATURE: 97 F | WEIGHT: 230 LBS | DIASTOLIC BLOOD PRESSURE: 57 MMHG | OXYGEN SATURATION: 93 % | HEIGHT: 72 IN | RESPIRATION RATE: 24 BRPM | HEART RATE: 73 BPM | BODY MASS INDEX: 31.15 KG/M2

## 2021-09-11 LAB
ALBUMIN SERPL-MCNC: 2.7 G/DL (ref 3.5–5.2)
ALP BLD-CCNC: 73 U/L (ref 40–129)
ALT SERPL-CCNC: 265 U/L (ref 0–40)
ANION GAP SERPL CALCULATED.3IONS-SCNC: 11 MMOL/L (ref 7–16)
AST SERPL-CCNC: 118 U/L (ref 0–39)
BILIRUB SERPL-MCNC: 0.4 MG/DL (ref 0–1.2)
BUN BLDV-MCNC: 56 MG/DL (ref 6–23)
C-REACTIVE PROTEIN: 0.7 MG/DL (ref 0–0.4)
CALCIUM SERPL-MCNC: 9.4 MG/DL (ref 8.6–10.2)
CHLORIDE BLD-SCNC: 103 MMOL/L (ref 98–107)
CO2: 22 MMOL/L (ref 22–29)
CREAT SERPL-MCNC: 1.3 MG/DL (ref 0.7–1.2)
D DIMER: 4155 NG/ML DDU
FERRITIN: 1186 NG/ML
FIBRINOGEN: 483 MG/DL (ref 225–540)
GFR AFRICAN AMERICAN: >60
GFR NON-AFRICAN AMERICAN: 54 ML/MIN/1.73
GLUCOSE BLD-MCNC: 180 MG/DL (ref 74–99)
HCT VFR BLD CALC: 45.5 % (ref 37–54)
HEMOGLOBIN: 15.5 G/DL (ref 12.5–16.5)
INR BLD: 1.3
LACTATE DEHYDROGENASE: 271 U/L (ref 135–225)
MCH RBC QN AUTO: 32.8 PG (ref 26–35)
MCHC RBC AUTO-ENTMCNC: 34.1 % (ref 32–34.5)
MCV RBC AUTO: 96.4 FL (ref 80–99.9)
PDW BLD-RTO: 15.1 FL (ref 11.5–15)
PLATELET # BLD: 95 E9/L (ref 130–450)
PLATELET CONFIRMATION: NORMAL
PMV BLD AUTO: 11.3 FL (ref 7–12)
POTASSIUM SERPL-SCNC: 4.6 MMOL/L (ref 3.5–5)
PROCALCITONIN: 0.09 NG/ML (ref 0–0.08)
PROTHROMBIN TIME: 14.6 SEC (ref 9.3–12.4)
RBC # BLD: 4.72 E12/L (ref 3.8–5.8)
SODIUM BLD-SCNC: 136 MMOL/L (ref 132–146)
TOTAL PROTEIN: 5.6 G/DL (ref 6.4–8.3)
TROPONIN, HIGH SENSITIVITY: 45 NG/L (ref 0–11)
WBC # BLD: 16.3 E9/L (ref 4.5–11.5)

## 2021-09-11 PROCEDURE — 85384 FIBRINOGEN ACTIVITY: CPT

## 2021-09-11 PROCEDURE — 85027 COMPLETE CBC AUTOMATED: CPT

## 2021-09-11 PROCEDURE — 2700000000 HC OXYGEN THERAPY PER DAY

## 2021-09-11 PROCEDURE — 86140 C-REACTIVE PROTEIN: CPT

## 2021-09-11 PROCEDURE — 94640 AIRWAY INHALATION TREATMENT: CPT

## 2021-09-11 PROCEDURE — 84484 ASSAY OF TROPONIN QUANT: CPT

## 2021-09-11 PROCEDURE — 71045 X-RAY EXAM CHEST 1 VIEW: CPT

## 2021-09-11 PROCEDURE — 83615 LACTATE (LD) (LDH) ENZYME: CPT

## 2021-09-11 PROCEDURE — 80053 COMPREHEN METABOLIC PANEL: CPT

## 2021-09-11 PROCEDURE — 85378 FIBRIN DEGRADE SEMIQUANT: CPT

## 2021-09-11 PROCEDURE — 99239 HOSP IP/OBS DSCHRG MGMT >30: CPT | Performed by: INTERNAL MEDICINE

## 2021-09-11 PROCEDURE — 85610 PROTHROMBIN TIME: CPT

## 2021-09-11 PROCEDURE — 36415 COLL VENOUS BLD VENIPUNCTURE: CPT

## 2021-09-11 PROCEDURE — 6360000002 HC RX W HCPCS: Performed by: INTERNAL MEDICINE

## 2021-09-11 PROCEDURE — 82728 ASSAY OF FERRITIN: CPT

## 2021-09-11 PROCEDURE — 6370000000 HC RX 637 (ALT 250 FOR IP): Performed by: INTERNAL MEDICINE

## 2021-09-11 PROCEDURE — 84145 PROCALCITONIN (PCT): CPT

## 2021-09-11 RX ORDER — ZINC SULFATE 50(220)MG
50 CAPSULE ORAL DAILY
Qty: 30 CAPSULE | Refills: 3 | COMMUNITY
Start: 2021-09-12

## 2021-09-11 RX ORDER — ASCORBIC ACID 500 MG
500 TABLET ORAL 2 TIMES DAILY
Qty: 30 TABLET | Refills: 0 | COMMUNITY
Start: 2021-09-11

## 2021-09-11 RX ORDER — DEXAMETHASONE 6 MG/1
6 TABLET ORAL 2 TIMES DAILY WITH MEALS
Qty: 10 TABLET | Refills: 0 | Status: SHIPPED | OUTPATIENT
Start: 2021-09-11 | End: 2021-09-16

## 2021-09-11 RX ORDER — BENZONATATE 100 MG/1
100 CAPSULE ORAL 3 TIMES DAILY PRN
Qty: 21 CAPSULE | Refills: 0 | Status: SHIPPED | OUTPATIENT
Start: 2021-09-11 | End: 2021-09-18

## 2021-09-11 RX ORDER — BUDESONIDE AND FORMOTEROL FUMARATE DIHYDRATE 160; 4.5 UG/1; UG/1
2 AEROSOL RESPIRATORY (INHALATION) 2 TIMES DAILY
Qty: 10.2 G | Refills: 0
Start: 2021-09-11 | End: 2022-01-03

## 2021-09-11 RX ORDER — CHOLECALCIFEROL (VITAMIN D3) 50 MCG
2000 TABLET ORAL DAILY
Qty: 60 TABLET | Refills: 0 | COMMUNITY
Start: 2021-09-12

## 2021-09-11 RX ORDER — PYRIDOXINE HCL (VITAMIN B6) 50 MG
50 TABLET ORAL DAILY
Qty: 30 TABLET | Refills: 0 | COMMUNITY
Start: 2021-09-12

## 2021-09-11 RX ADMIN — Medication 2000 UNITS: at 09:38

## 2021-09-11 RX ADMIN — PROBENECID AND COLCHICINE 1 TABLET: 500; .5 TABLET ORAL at 09:39

## 2021-09-11 RX ADMIN — ALLOPURINOL 300 MG: 300 TABLET ORAL at 09:38

## 2021-09-11 RX ADMIN — DEXAMETHASONE SODIUM PHOSPHATE 10 MG: 10 INJECTION INTRAMUSCULAR; INTRAVENOUS at 09:38

## 2021-09-11 RX ADMIN — METHIMAZOLE 7.5 MG: 5 TABLET ORAL at 09:39

## 2021-09-11 RX ADMIN — METOPROLOL SUCCINATE 100 MG: 25 TABLET, EXTENDED RELEASE ORAL at 09:38

## 2021-09-11 RX ADMIN — BENZONATATE 100 MG: 100 CAPSULE ORAL at 09:38

## 2021-09-11 RX ADMIN — BUDESONIDE AND FORMOTEROL FUMARATE DIHYDRATE 2 PUFF: 160; 4.5 AEROSOL RESPIRATORY (INHALATION) at 06:54

## 2021-09-11 RX ADMIN — ENOXAPARIN SODIUM 40 MG: 40 INJECTION SUBCUTANEOUS at 09:37

## 2021-09-11 RX ADMIN — OXYCODONE HYDROCHLORIDE AND ACETAMINOPHEN 500 MG: 500 TABLET ORAL at 09:39

## 2021-09-11 RX ADMIN — PYRIDOXINE HCL TAB 50 MG 50 MG: 50 TAB at 09:38

## 2021-09-11 RX ADMIN — ZINC SULFATE 220 MG (50 MG) CAPSULE 50 MG: CAPSULE at 09:38

## 2021-09-11 RX ADMIN — LEVETIRACETAM 500 MG: 500 TABLET ORAL at 09:38

## 2021-09-11 RX ADMIN — LISINOPRIL 10 MG: 10 TABLET ORAL at 09:38

## 2021-09-11 ASSESSMENT — PAIN SCALES - GENERAL
PAINLEVEL_OUTOF10: 0

## 2021-09-11 NOTE — PROGRESS NOTES
Pulse ox was___84_% on room air at rest.  Ambulated patient on room air. Recovery pulse ox was ___88__% on __4___liters of oxygen while ambulating.

## 2021-09-11 NOTE — CARE COORDINATION
SOCIAL WORK / DISCHARGE PLANNING:  COVID positive 9/6. Pt qualified for home O2, 5L. Referral made Manuela on 9/10. Sw spoke with Sue Tapia, rep made aware of dc, await O2 order in Epic. Testing is complete. Plan delivery of portable and concentrator to home, discussed via phone with pt as well as [employer]. She is aware of need to bring portable tank to hospital to use when transporting pt home. No other dc needs noted.                  Electronically signed by ANNELIESE Peralta on 9/11/2021 at 12:47 PM

## 2021-09-11 NOTE — CARE COORDINATION
Patient qualifies for home oxygen as evidenced by home O2 study done by nursing as noted below:    Pulse ox was__84_% on room air at rest.  Oxygen applied. Recovery pulse ox was ___90__% on __5__liters of oxygen while ambulating.     Electronically signed by Divina Johnston MD on 9/11/2021 at 12:53 PM

## 2021-09-11 NOTE — PLAN OF CARE
Problem: Airway Clearance - Ineffective  Goal: Achieve or maintain patent airway  9/11/2021 0204 by Brandon Walker RN  Outcome: Met This Shift  9/10/2021 1310 by Nando Diaz RN  Outcome: Met This Shift     Problem: Gas Exchange - Impaired  Goal: Absence of hypoxia  9/11/2021 0204 by Brandon Walker RN  Outcome: Met This Shift  9/10/2021 1310 by Nando Diaz RN  Outcome: Met This Shift  Goal: Promote optimal lung function  9/11/2021 0204 by Brandon Walker RN  Outcome: Met This Shift  9/10/2021 1310 by Nando Diza RN  Outcome: Met This Shift     Problem: Breathing Pattern - Ineffective  Goal: Ability to achieve and maintain a regular respiratory rate  9/11/2021 0204 by Brandon Walker RN  Outcome: Met This Shift  9/10/2021 1310 by Nando Diaz RN  Outcome: Met This Shift     Problem:  Body Temperature -  Risk of, Imbalanced  Goal: Ability to maintain a body temperature within defined limits  9/11/2021 0204 by Brandon Walker RN  Outcome: Met This Shift  9/10/2021 1310 by Nnado Diaz RN  Outcome: Met This Shift  Goal: Will regain or maintain usual level of consciousness  9/11/2021 0204 by Brandon Walker RN  Outcome: Met This Shift  9/10/2021 1310 by Nando Diaz RN  Outcome: Met This Shift  Goal: Complications related to the disease process, condition or treatment will be avoided or minimized  9/11/2021 0204 by Brandon Walker RN  Outcome: Met This Shift  9/10/2021 1310 by Nando Diaz RN  Outcome: Met This Shift     Problem: Isolation Precautions - Risk of Spread of Infection  Goal: Prevent transmission of infection  9/11/2021 0204 by Brandon Walker RN  Outcome: Met This Shift  9/10/2021 1310 by Nando Diaz RN  Outcome: Met This Shift     Problem: Nutrition Deficits  Goal: Optimize nutritional status  9/11/2021 0204 by Brandon Walker RN  Outcome: Met This Shift  9/10/2021 1310 by Nando Diaz RN  Outcome: Met This Shift Problem: Risk for Fluid Volume Deficit  Goal: Maintain normal heart rhythm  9/11/2021 0204 by Dana Pearson RN  Outcome: Met This Shift  9/10/2021 1310 by Alex Hicks RN  Outcome: Met This Shift  Goal: Maintain absence of muscle cramping  9/11/2021 0204 by Dana Pearson RN  Outcome: Met This Shift  9/10/2021 1310 by Alex Hicks RN  Outcome: Met This Shift  Goal: Maintain normal serum potassium, sodium, calcium, phosphorus, and pH  9/11/2021 0204 by Dana Pearson RN  Outcome: Met This Shift  9/10/2021 1310 by Alex Hicks RN  Outcome: Met This Shift     Problem: Loneliness or Risk for Loneliness  Goal: Demonstrate positive use of time alone when socialization is not possible  9/11/2021 0204 by Dana Pearson RN  Outcome: Met This Shift  9/10/2021 1310 by Alex Hicks RN  Outcome: Met This Shift     Problem: Fatigue  Goal: Verbalize increase energy and improved vitality  9/11/2021 0204 by Dana Pearson RN  Outcome: Met This Shift  9/10/2021 1310 by Alex Hicks RN  Outcome: Met This Shift     Problem: Patient Education: Go to Patient Education Activity  Goal: Patient/Family Education  9/11/2021 0204 by Dana Pearson RN  Outcome: Met This Shift  9/10/2021 1310 by Alex Hicks RN  Outcome: Met This Shift     Problem: Falls - Risk of:  Goal: Will remain free from falls  Description: Will remain free from falls  9/11/2021 0204 by Dana Pearson RN  Outcome: Met This Shift  9/10/2021 1310 by Alex Hicks RN  Outcome: Met This Shift  Goal: Absence of physical injury  Description: Absence of physical injury  9/11/2021 0204 by Dana Pearson RN  Outcome: Met This Shift  9/10/2021 1310 by Alex Hicks RN  Outcome: Met This Shift

## 2021-09-11 NOTE — PROGRESS NOTES
Pulse ox was__84_% on room air at rest.  Oxygen applied. Recovery pulse ox was ___90__% on __5__liters of oxygen while ambulating.

## 2021-09-13 ENCOUNTER — CARE COORDINATION (OUTPATIENT)
Dept: CASE MANAGEMENT | Age: 76
End: 2021-09-13

## 2021-09-13 NOTE — CARE COORDINATION
[facility] Transitions Initial Follow Up Call    Call within 2 business days of discharge: Yes    Patient: Rosa M Munguia Patient : 1945   MRN: <[id number]>  Reason for Admission: ACUTE RESPIRATORY FAILURE DUE TO COVID 19  Discharge Date: 21 RARS: Readmission Risk Score: 23      Last Discharge [facility]       Complaint Diagnosis Description Type Department Provider    21 Positive For Covid-19 Acute respiratory failure due to COVID-19 [facility]) ED to Hosp-Admission (Discharged) (ADMITTED) SJLILIANEZ 6S Barby Bond MD; Cora Bower. .. Spoke with: [address] [facility]      Non-face-to-face services provided:  Scheduled appointment with PCP-reports that he spoke with Cristiana Hampton NP's office regarding follow up, they will call him with follow up information. Obtained and reviewed discharge summary and/or continuity of care documents    Care Transitions 24 Hour Call    Do you have any ongoing symptoms?: No  Do you have a copy of your discharge instructions?: Yes  Do you have all of your prescriptions and are they filled?: Yes  Have you been contacted by a [facility] Pharmacist?: No  Have you scheduled your follow up appointment?: No  Were you discharged with any Home Care or Post Acute Services: No  Do you feel like you have everything you need to keep you well at home?: Yes  Care Transitions Interventions  No Identified Needs     Spoke with Shay Rosenbaum for initial BPCI care transition call post hospital discharge. Explained the role of Care Transition Nurse and the BPCI-A program, he is agreeable to follow up post discharge from the hospital. Shay Rosenbaum reports that he is \"doing better\" today. He denies SOB at this time and is wearing 3L O2. His pulse ox reading has been 93-95%. He is doing deep breathing exercises and feel that it helping. He reports that he is getting up and moving around for short periods in his home. Med review completed.

## 2021-09-23 ENCOUNTER — CARE COORDINATION (OUTPATIENT)
Dept: CASE MANAGEMENT | Age: 76
End: 2021-09-23

## 2021-09-23 NOTE — CARE COORDINATION
[facility] Transitions Follow Up Call    2021    Patient: Michelle Arauz  Patient : 1945   MRN: <[id number]>  Reason for Admission: ACUTE RESPIRATORY FAILURE DUE TO COVID 19  Discharge Date: 21 RARS: Readmission Risk Score: 23    Attempted to reach the patient for BPCI covid Care Transition call post hospital discharge. Message left with CTN's contact information requesting return phone call. Care Transitions Subsequent and Final Call    Subsequent and Final Calls  Care Transitions Interventions  Other Interventions:            Follow Up  Future Appointments   Date Time Provider Santos Orantes   10/13/2021 11:15 AM Mandy Spangler MD [facility]   10/27/2021 11:00 AM Angel Cummings Aas, MD [facility]       Danielito Taveras RN

## 2021-09-30 ENCOUNTER — HOSPITAL ENCOUNTER (OUTPATIENT)
Dept: CT IMAGING | Age: 76
Discharge: HOME | End: 2021-09-30
Payer: MEDICARE

## 2021-09-30 ENCOUNTER — CARE COORDINATION (OUTPATIENT)
Dept: CASE MANAGEMENT | Age: 76
End: 2021-09-30

## 2021-09-30 ENCOUNTER — HOSPITAL ENCOUNTER (OUTPATIENT)
Dept: ULTRASOUND IMAGING | Age: 76
Discharge: HOME | End: 2021-09-30
Payer: MEDICARE

## 2021-09-30 DIAGNOSIS — M79.89 PAIN AND SWELLING OF LOWER LEG, UNSPECIFIED LATERALITY: ICD-10-CM

## 2021-09-30 DIAGNOSIS — Z98.890 HISTORY OF BURR HOLE SURGERY: ICD-10-CM

## 2021-09-30 DIAGNOSIS — S06.5XAA SDH (SUBDURAL HEMATOMA): ICD-10-CM

## 2021-09-30 DIAGNOSIS — M79.669 PAIN AND SWELLING OF LOWER LEG, UNSPECIFIED LATERALITY: ICD-10-CM

## 2021-09-30 PROCEDURE — 93970 EXTREMITY STUDY: CPT

## 2021-09-30 PROCEDURE — 70450 CT HEAD/BRAIN W/O DYE: CPT

## 2021-10-14 ENCOUNTER — CARE COORDINATION (OUTPATIENT)
Dept: CASE MANAGEMENT | Age: 76
End: 2021-10-14

## 2021-10-14 NOTE — DISCHARGE SUMMARY
[facility] Physician Discharge Summary       Chastity Rodríguez MD  [address]  [address]  [facility] [address]  [phone]      Call for follow up 1 week post hospitalization      Activity level: As tolerated    Diet: No diet orders on file    Labs:None     Condition at discharge: Stable     Dispo:Home    Continue supplemental oxygen via nasal canula @ 5 LPM round-the-clock. Patient ID:  Ashely Estrada  [id number]  68 y.o.  1945    Admit date: 9/6/2021    Discharge date and time: 09/11/2021     Admission Diagnoses: Principal Problem:    Acute hypoxemic respiratory failure due to [address])  Active Problems:    CKD (chronic kidney disease) stage 3, GFR 30-59 ml/min    Coronary artery disease involving native coronary artery of native heart    Class 1 obesity due to excess calories without serious comorbidity with body mass index (BMI) of 31.0 to 31.9 in adult    Hyperthyroidism    SDH (subdural hematoma) ([facility] [address]    Toxic multinodul goiter  Resolved Problems:    * No resolved hospital problems. *      Discharge Diagnoses: Principal Problem:    Acute hypoxemic respiratory failure due to [facility] [place] [address])  Active Problems:    CKD (chronic kidney disease) stage 3, GFR 30-59 ml/min    Coronary artery disease involving native coronary artery of native heart    Class 1 obesity due to excess calories without serious comorbidity with body mass index (BMI) of 31.0 to 31.9 in adult    Hyperthyroidism    SDH (subdural hematoma) (H[facility])    Toxic multinodul goiter  Resolved Problems:    * No resolved hospital problems. *      Consults:  IP CONSULT TO PHARMACY  IP CONSULT TO NEUROSURGERY  IP CONSULT TO PHARMACY    Procedures: None    History of Present Illness:  68 y.o. male with a history of hypertension, CKD stage III, traumatic subdural hematoma June 2021 who presents with shortness of breath, productive cough and worsening oxygenation at home.   Patient had pulse ox readings at home that were in the 70s and that would brought him to the emergency room. He is not on chronic oxygen. Wife is sick at home as well. Patient admits to feeling weak overall with decreased appetite having chills but no fevers. Initial O2 saturation was 85 to 90%. He was placed on 6 L of oxygen but at 1 point titrated up to 15 L while in the emergency room. Patient was Covid tested and it was positive. Given the hypoxia, he qualify for remdesivir and that was ordered and given upon admission to the floor. Of note, patient presented to the ER 9/4/21 with symptoms of Covid which had occurred few days prior to that evaluation. He had been tested as an outpatient and again in the ER visit was positive for Covid. Patient was discharged home on cefdinir 300 mg twice daily for 10 days, doxycycline 100 mg twice a day for 14 days, and dexamethasone 6 mg twice daily for 10 days. He was given albuterol inhaler as well as Tessalon Perles for symptomatic relief. At ER visit, he was ambulated but did maintain O2 sat at 95%.       Hospital Course: 69 yo male admitted as per above details. See outline below for additional details and issues addressed this admission:     9/7: Patient states he is doing better today. He has been doing his breathing exercises. He has been trying to lay on his prone but cannot fully get to that position but does state rotate from side to side.     9/8: Patient sitting up in bed. Feels fine but did state his oxygen did drop when he got up to go to the bathroom 2 times today. He has been using his incentive spirometer and flutter valve.     9/9: Patient is sitting up in bed. He is happy he is down to 2L but does add he gets down to mid 80's when he gets up to go to the bathroom. 9/10: Patient is somewhat discouraged that her oxygen levels dropped so much when he ambulated today.   He qualifies for home oxygen but we cannot provide the level of oxygen needs with ambulation at this point.     9/11: Patient eager for discharge. He qualifies for 5L with exertion.       1. Acute hypoxic respiratory failure due to COVID-19  -Supplemental oxygen and wean as tolerated but needing 6-15 L already on admission; patient was counseled by nursing and myself about proning. Weaned down to 2L. Home oxygen evaluation this morning indicates patient requires more than 6 L with ambulation.   -Initiated with Decadron in the ER 6 mg but increased up to 10 mg every 12 hours per her new higher dose protocol day #6 treatment. Discharge home to complete Decadron 6 mg po bid x 5 days.  -Started with remdesivir day #3/5 completed 9/8. Remdesivir held  due to elevated LFTs and then  discontinued altogether.  -Placed on Symbicort 160/4.5 2 puffs bid rinse and spit to help with inspiratory effort; IS and PEP flutter valve.  -patient with mild lactic acidosis which improved without IVF. Lactic 3.9-->2.7  -Checked CRP to see if qualifies for tocilizumab but it came back at 7.4. Discussed with Pharm D and patient would be better candidate for baricitinib 2mg daily #3/14 but was put on hold after elevated LFTs. LFTs were  worse yesterday so cannot resume treatment for now  -Follow COVID labs. CRP trending down 7.4--> 4.7-->1.9-->0.7 on day of discharge  -Vitamin regimen of B6, vitamin C, vitamin D and zinc  -Checked urinary antigens for strep pneumoniae and Legionella which were negative.  -Lovenox bid if okay with neurosurgery due to SDH in June. Got  CT head which came back showing smaller size of SDH. Discussed with neurosurgery Tues morning via [employer] and was okayed for anticoagulation but to closely monitor his platelets given his prior thrombocytopenia. Platelet count has been in the low 100 range. Given persistently elevated D Dimer, decided to send home on Eliquis 5 mg bid for next 30 days but should closely follow up with neurosurgeon as well as PCP.   -CT chest was also obtained Monday night and this came back showing moderate bilateral groundglass opacities consistent with Covid pneumonia    2. CKD stage 3  -Creatinine 1.7 on admission with prior baseline of 1.3-->1.6  -monitor closely. Creatinine last 3 days 1.4     3. Hyperthyroidism due to [address]  -patient follow with Dr. Siva Mon.  -he is on methimazole 7.5 mg daily.      4. Traumatic SDH 6/25/21 while on Xarelto  -follow with Dr. Tayler Stoll of neurosurgery. He was due for follow up CT head 9/15 but will go ahead and obtain CT head now to see if stable and then get clearance for Lovenox.  -Neurosurgery consulted for anticoagulation clearance. Okay to place on Lovenox as detailed above.  -Continue Keppra for seizure prophylaxis.     5. CAD  -Trop HS is elevated at 83 but this is lower than ER visit 9/4  -EKG without acute ischemic changes.     6. Elevated LFTs  - spoke pharm D on Thursday and concern that it is related to remdesivir and/or baricitinib. We decided to stop remdesivir and place the other on hold. 7. Disposition  -Discharge delayed yesterday because oxygen requirements with ambulation are too high for discharge. He did improve over last 24 hours to be discharged on 5L of oxygen. Discharge Exam:  Vitals:    09/10/21 1645 09/11/21 0015 09/11/21 0654 09/11/21 0930   BP: (!) 119/93 132/73  (!) 104/57   Pulse: 64 65  73   Resp: 16 24  24   Temp: 97.1 °F (36.2 °C) 97.5 °F (36.4 °C)  97 °F (36.1 °C)   TempSrc: Infrared Infrared  Infrared   SpO2: 94% 98% 93% 93%   Weight:       Height:         General Appearance: alert and oriented to person, place and time and in no acute distress but does have some conversational dyspnea. Skin: warm and dry  Head: normocephalic and atraumatic  Eyes: pupils equal, round, and reactive to light, extraocular eye movements intact, conjunctivae normal  Neck: neck supple and non tender without mass   Pulmonary/Chest: diminished breath sounds with scattered wheezes.   Cardiovascular: normal rate, normal S1 and S2 and no carotid bruits  Abdomen: soft, non-tender, non-distended, normal bowel sounds, no masses or organomegaly  Extremities: no cyanosis, no clubbing and no edema  Neurologic: no cranial nerve deficit and speech normal    LABS:           Recent Labs     09/08/21  0702 09/09/21  0520 09/10/21  0628    142 139   K 4.1 4.4 4.7    109* 106   CO2 21* 22 23   BUN 57* 60* 58*   CREATININE 1.4* 1.4* 1.4*   GLUCOSE 164* 164* 164*   CALCIUM 9.3 9.7 9.6               Recent Labs     09/08/21  0702 09/09/21  0520 09/10/21  0628   ALKPHOS 72 72 71   PROT 5.9* 6.0* 5.8*   LABALBU 2.9* 3.0* 3.1*   BILITOT 0.5 0.4 0.5   AST 79* 126* 133*   * 195* 245*               Recent Labs     09/08/21  0702 09/09/21  0520 09/10/21  0628   WBC 10.1 12.6* 15.7*   RBC 4.40 4.61 4.74   HGB 14.5 15.2 15.5   HCT 42.1 45.6 45.8   MCV 95.7 98.9 96.6   MCH 33.0 33.0 32.7   MCHC 34.4 33.3 33.8   RDW 15.0 15.1* 15.0   * 103* 104*   MPV 11.6 11.7 11.8       SARS-COV-2 biomarkers           Recent Labs     09/08/21  0702 09/09/21  0520 09/10/21  0628   CRP  --  1.9*  --    PROCAL  --  0.12*  --    FERRITIN  --  1,207  --    LDH  --  286*  --    DDIMER  --  >5250  --    FIBRINOGEN  --  617*  --    INR  --  1.3  --    PROTIME  --  14.7*  --    AST 79* 126* 133*   * 195* 245*                 Strep Pneumoniae Antigen [[id number]] Collected: 09/06/21 2118     Specimen: Urine voided Updated: 09/07/21 4771       STREP PNEUMONIAE ANTIGEN, URINE --       Presumptive negative- suggests no current or recent   pneumococcal infection.    Infection due to Strep pneumoniae cannot be   ruled out since the antigen present in the sample   may be below the detection limit of the test.   Normal Range:Presumptive Negative      Narrative:       Source: URV       Site:                LEGIONELLA ANTIGEN, URINE [[id number]] Collected: 09/06/21 2118     Specimen: Urine voided Updated: 09/07/21 0950       L. pneumophila Serogp 1 Ur Ag --       Presumptive Negative -suggesting no recent or current infections   with Legionella pneumophila serogroup 1. Infection to Legionella cannot be ruled out since other serogroups   and species may cause infection, antigen may not be present in   early infection, or level of antigen may be below the   detection limit. Normal Range: Presumptive Negative      Narrative:       Source: URV       Site:          Imaging:      CT HEAD WO CONTRAST    Result Date: 10/1/2021  EXAMINATION: CT OF THE HEAD WITHOUT CONTRAST  9/30/2021 4:39 pm TECHNIQUE: CT of the head was performed without the administration of intravenous contrast. Dose modulation, iterative reconstruction, and/or weight based adjustment of the mA/kV was utilized to reduce the radiation dose to as low as reasonably achievable. COMPARISON: CT head without contrast 09/06/2021. HISTORY: ORDERING SYSTEM PROVIDED HISTORY: SDH (subdural hematoma) ([facility] [address] TECHNOLOGIST PROVIDED HISTORY: Reason for exam:->follow up FINDINGS: BRAIN/VENTRICLES: There is no acute intracranial hemorrhage, mass effect or midline shift. No abnormal extra-axial fluid collection. The gray-white differentiation is maintained without evidence of an acute infarct. There is no evidence of hydrocephalus. Left middle cranial fossa anterior ovoid arachnoid cyst is again seen. ORBITS: The visualized portion of the orbits demonstrate no acute abnormality. SINUSES: The visualized paranasal sinuses and mastoid air cells demonstrate no acute abnormality. SOFT TISSUES/SKULL:  Redemonstration postoperative findings related to right frontal ellie hole with underlying frontotemporal dural thickening/scarring. No acute intracranial abnormality. Right frontal ellie hole with underlying right frontotemporal suspected dural thickening/scarring which is unchanged in appearance.  Stable appearance left middle cranial fossa anterior ovoid arachnoid cyst.     US DUP LOWER EXTREMITIES BILATERAL VENOUS    Result Date: 9/30/2021  EXAMINATION: DUPLEX VENOUS ULTRASOUND OF THE BILATERAL LOWER EXTREMITIES9/30/2021 4:47 pm TECHNIQUE: Duplex ultrasound using B-mode/gray scaled imaging, Doppler spectral analysis and color flow Doppler was obtained of the deep venous structures of the lower bilateral extremities. COMPARISON: None used. HISTORY: ORDERING SYSTEM PROVIDED HISTORY: Pain and swelling of lower leg, unspecified laterality TECHNOLOGIST PROVIDED HISTORY: What reading provider will be dictating this exam?->CRC FINDINGS: The visualized veins of the bilateral lower extremities are patent and free of echogenic thrombus. The veins demonstrate good compressibility with normal color flow study and spectral analysis. No evidence of DVT in either lower extremity. Patient Instructions:   Discharge Medication List as of 9/11/2021  6:08 PM      START taking these medications    Details   budesonide-formoterol (SYMBICORT) 160-4.5 MCG/ACT AERO Inhale 2 puffs into the lungs 2 times daily, Disp-10.2 g, R-0NO PRINT      vitamin B-6 (B-6) 50 MG tablet Take 1 tablet by mouth daily, Disp-30 tablet, R-0OTC      Vitamin D (CHOLECALCIFEROL) 50 MCG (2000 UT) TABS tablet Take 1 tablet by mouth daily, Disp-60 tablet, R-0Labeling may look different. 25 mcg=1000 Units. Please double check dosages. OTC      zinc sulfate (ZINCATE) 220 (50 Zn) MG capsule Take 1 capsule by mouth daily, Disp-30 capsule, R-3OTC      apixaban (ELIQUIS) 5 MG TABS tablet Take 1 tablet by mouth 2 times daily, Disp-60 tablet, R-0Patient will bring in free Copay card. Normal      ascorbic acid (VITAMIN C) 500 MG tablet Take 1 tablet by mouth 2 times daily, Disp-30 tablet, R-0OTC         CONTINUE these medications which have CHANGED    Details   benzonatate (TESSALON PERLES) 100 MG capsule Take 1 capsule by mouth 3 times daily as needed for Cough, Disp-21 capsule, R-0Normal      dexamethasone (DECADRON) 6 MG tablet Take 1 tablet by mouth 2 times daily (with meals) for 5 days, Disp-10 tablet, R-0Normal         CONTINUE these medications which have NOT CHANGED    Details   levETIRAcetam (KEPPRA) 500 MG tablet Take 1 tablet by mouth 2 times daily, Disp-60 tablet, R-5Normal      methIMAzole (TAPAZOLE) 5 MG tablet Take 1.5 tablets by mouth daily 1.5 tabs, Disp-135 tablet, R-3Normal      LORazepam (ATIVAN) 1 MG tablet Take 1 mg by mouth nightly as needed for Anxiety. Historical Med      lisinopril (PRINIVIL;ZESTRIL) 10 MG tablet Take 10 mg by mouth dailyHistorical Med      colchicine-probenecid 0.5-500 MG per tablet TAKE ONE TABLET BY MOUTH EVERY DAYHistorical Med      hydrochlorothiazide (MICROZIDE) 12.5 MG capsule Take 12.5 mg by mouth dailyHistorical Med      metoprolol succinate ER (TOPROL-XL) 100 MG XL tablet Take 100 mg by mouth dailyHistorical Med      nitroGLYCERIN (NITROSTAT) 0.4 MG SL tablet Place 1 tablet under the tongue every 5 minutes as needed for Chest pain., Disp-25 tablet, R-3Normal      allopurinol (ZYLOPRIM) 300 MG tablet Take 300 mg by mouth daily. Historical Med         STOP taking these medications       cefdinir (OMNICEF) 300 MG capsule Comments:   Reason for Stopping:         doxycycline hyclate (VIBRA-TABS) 100 MG tablet Comments:   Reason for Stopping:         albuterol sulfate HFA (VENTOLIN HFA) 108 (90 Base) MCG/ACT inhaler Comments:   Reason for Stopping:         butalbital-acetaminophen-caffeine (FIORICET, ESGIC) -40 MG per tablet Comments:   Reason for Stopping:         atorvastatin (LIPITOR) 20 MG tablet Comments:   Reason for Stopping:               Note that more than 30 minutes was spent in preparing discharge papers, discussing discharge with patient, medication review, etc.    NOTE: This report was transcribed using voice recognition software. Every effort was made to ensure accuracy; however, inadvertent computerized transcription errors may be present.      Signed:  Electronically signed by Reina Hilliard MD on 10/14/2021 at 9:44 AM

## 2021-10-14 NOTE — CARE COORDINATION
[facility] Transitions Follow Up Call    10/14/2021    Patient: Fausto Arenas  Patient : 1945   MRN: <[id number]>  Reason for Admission: ACUTE RESPIRATORY FAILURE DUE TO COVID 19  Discharge Date: 21 RARS: Readmission Risk Score: 23    Attempted to reach the patient for final BPCI/COVID Care Transition call post hospital discharge. Message left with CTN's contact information requesting return phone call. Final ICD-10 Code confirmed as U07.1 for anchor admission . No further outreach at this time, will follow chart peripherally.      Follow Up  Future Appointments   Date Time Provider [place]   10/27/2021 11:00 AM Jimenez Bo MD [facility]   2021 11:30 AM Lisa Feliz MD [email or web address]   1/3/2022 11:15 AM Gayathri Segal MD [facility]       Chanell Pantoja RN

## 2022-01-02 NOTE — PROGRESS NOTES
[facility] Cardiology Progress Note  Dr. Enrique Pardo      Referring Physician: Cammie Veliz MD  CHIEF COMPLAINT:   Chief Complaint   Patient presents with    Coronary Artery Disease     Annual.  Patient denies any cp sob or dizzinesx. HISTORY OF PRESENT ILLNESS:   Patient  is 68years old male with PCI to LAD, HTN, hyperlipidemia, thyroid disease, and pulmonary embolism after back surgery is here for follow up appointment. Patient denies any chest pain, no shortness of breath, no lightheadedness, no dizziness, no palpitations, no pedal edema, no PND, no orthopnea, no syncope, no presyncopal episodes.   History of traumatic subdural hematoma  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       Past Medical History:   Diagnosis Date    Bilateral renal cysts 10/13/2014    CAD (coronary artery disease)     Cardiomyopathy ([address]     Cerebral artery occlusion with cerebral infarction ([address]     Chest pain     8-4-2016 lexiscan stress    Hemorrhagic stroke ([address]     History of gout 10/13/2014    History of pulmonary embolism 10/13/2014    Hyperlipidemia     Hypertension     Hyperthyroidism 10/13/2014    NSTEMI (non-ST elevated myocardial infarction) ([address] 10/13/2014    Obesity due to excess calories     Thyroid cyst 10/13/2014    Thyroid disease          Past Surgical History:   Procedure Laterality Date    BACK SURGERY      CORONARY ANGIOPLASTY WITH STENT PLACEMENT  10-    Dr. Sj Humphrey- [facility] 3.0x18 MID LAD    CRANIOTOMY Right 6/25/2021    RIGHT FRONTAL REMINGTON HOLE TO DRAIN HEMATOMA AND PLACEMENT OF SUBDURAL DRAIN performed by Elly Ureña MD at [address] [facility] CATH LAB PROCEDURE  10/13/2014    Dr. Sj Humphrey: PCI to LAD         Current Outpatient Medications   Medication Sig Dispense Refill    furosemide (LASIX) 20 MG tablet TAKE ONE TABLET BY MOUTH DAILY      metoprolol succinate (TOPROL XL) 100 MG extended release tablet Take 1 tablet by mouth daily 90 tablet 3    methylPREDNISolone (MEDROL, MARILU,) 4 MG tablet Take by mouth. 1 kit 0    vitamin B-6 (B-6) 50 MG tablet Take 1 tablet by mouth daily 30 tablet 0    Vitamin D (CHOLECALCIFEROL) 50 MCG (2000 UT) TABS tablet Take 1 tablet by mouth daily 60 tablet 0    zinc sulfate (ZINCATE) 220 (50 Zn) MG capsule Take 1 capsule by mouth daily 30 capsule 3    ascorbic acid (VITAMIN C) 500 MG tablet Take 1 tablet by mouth 2 times daily 30 tablet 0    levETIRAcetam (KEPPRA) 500 MG tablet Take 1 tablet by mouth 2 times daily 60 tablet 5    methIMAzole (TAPAZOLE) 5 MG tablet Take 1.5 tablets by mouth daily 1.5 tabs 135 tablet 3    lisinopril (PRINIVIL;ZESTRIL) 20 MG tablet Take 20 mg by mouth daily       colchicine-probenecid 0.5-500 MG per tablet TAKE ONE TABLET BY MOUTH EVERY DAY      nitroGLYCERIN (NITROSTAT) 0.4 MG SL tablet Place 1 tablet under the tongue every 5 minutes as needed for Chest pain. 25 tablet 3    allopurinol (ZYLOPRIM) 300 MG tablet Take 300 mg by mouth daily.  hydrochlorothiazide (MICROZIDE) 12.5 MG capsule Take 12.5 mg by mouth daily       No current facility-administered medications for this visit.          Allergies as of 01/03/2022    (No Known Allergies)       Social History     Socioeconomic History    Marital status:      Spouse name: Not on file    Number of children: Not on file    Years of education: Not on file    Highest education level: Not on file   Occupational History    Not on file   Tobacco Use    Smoking status: Never Smoker    Smokeless tobacco: Never Used   Vaping Use    Vaping Use: Never used   Substance and Sexual Activity    Alcohol use: Yes     Comment: occasional. No caffeine    Drug use: No    Sexual activity: Not on file   Other Topics Concern    Not on file   Social History Narrative    Not on file     Social Determinants of Health     Financial Resource Strain:     Difficulty of Paying Living Expenses: Not on file   Food Insecurity:     Worried About Running Out of Food in the Last Year: Not on file    Bishnu of Food in the Last Year: Not on file   Transportation Needs:     Lack of Transportation (Medical): Not on file    Lack of Transportation (Non-Medical):  Not on file   Physical Activity:     Days of Exercise per Week: Not on file    Minutes of Exercise per Session: Not on file   Stress:     Feeling of Stress : Not on file   Social Connections:     Frequency of Communication with Friends and Family: Not on file    Frequency of Social Gatherings with Friends and Family: Not on file    Attends Jainism Services: Not on file    Active Member of [address] [place] [address] [employer] or Organizations: Not on file    Attends Club or Organization Meetings: Not on file    Marital Status: Not on file   Intimate Partner Violence:     Fear of Current or Ex-Partner: Not on file    Emotionally Abused: Not on file    Physically Abused: Not on file    Sexually Abused: Not on file   Housing Stability:     Unable to Pay for Housing in the Last Year: Not on file    Number of [place] in the Last Year: Not on file    Unstable Housing in the Last Year: Not on file       Family History   Problem Relation Age of Onset    Heart Disease Mother         cabg 3    Heart Disease Father         pacemaker       REVIEW OF SYSTEMS:     CONSTITUTIONAL:  negative for  fevers, chills, sweats and fatigue  HEENT:  negative for  tinnitus, earaches, nasal congestion and epistaxis  RESPIRATORY:  negative for  dry cough, cough with sputum, dyspnea, wheezing and hemoptysis  GASTROINTESTINAL:  negative for nausea, vomiting, diarrhea, constipation, pruritus and jaundice  HEMATOLOGIC/LYMPHATIC:  negative for easy bruising, bleeding, lymphadenopathy and petechiae  ENDOCRINE:  negative for heat intolerance, cold intolerance, tremor, hair loss and diabetic symptoms including neither polyuria nor polydipsia nor blurred vision  MUSCULOSKELETAL:  negative for  myalgias, arthralgias, joint swelling, stiff joints and decreased range of motion  NEUROLOGICAL:  negative for memory problems, speech problems, visual disturbance, dysphagia, weakness and numbness      PHYSICAL EXAM:   Constitutional:  Awake, alert cooperative, no apparent distress, and appears stated age. HEENT:  Moist and pink mucous membranes, normocephalic, without obvious abnormality, atraumatic, normal ears and nose. Neck:  Supple, symmetrical, trachea midline, no JVD, no adenopathy, thyroid symmetric, not enlarged and no tenderness, good carotid upstroke bilaterally, no carotid bruit, skin normal  Lungs: No increased work of breathing, decreased air exchange, bilateral wheezing  Cardiovascular: Normal apical impulse, regular rate and rhythm, normal S1 and S2, no S3 or S4, no murmur, no pedal edema, good carotid upstroke bilaterally, no carotid bruit, no JVD, no abdominal pulsating masses. Abdomen: Soft, nontender, no hepatomegaly, no splenomegaly, bowel sound positive. CHEST:  Expands symmetrically, nontender to palpation. Musculoskeletal:  No clubbing or cyanosis. No redness, warmth, or swelling of the joints. Neurological: Alert, awake, and oriented X3. /68 (Site: Right Upper Arm, Position: Sitting, Cuff Size: Medium Adult)   Pulse 94   Ht 6' (1.829 m)   Wt 232 lb (105.2 kg)   BMI 31.46 kg/m²     DATA:   I personally reviewed the visit EKG with the following interpretation: Sinus rhythm, first-degree AV block, poor R wave progression, normal axis    EKG - 9/6/21 Sinus rhythm with 1st degree AV block  Otherwise normal ECG  When compared with ECG of 04-SEP-2021 16:00,  No significant change was found    ECHO: 11/16/15 Normal left ventricular systolic function Stage I diastolic dysfunction EF 75% Mild mitral regurgitation Trace tricuspid regurgitation    Stress Test: 8/4/16   Technique:    The patient received 12.97 mCi of Cardiolite for the resting study,    followed by standard SPECT imaging .  The CAD: Status post-PCI to LAD in October 2014 using a 3.0×18 mm drug-eluting stent, doing fine and will continue current treatment. 2: Essential (primary) hypertension: Controlled, continue current medications. 3:  Chronic kidney disease, stage 3 (moderate)              4:  Hyperlipidemia: On Statin                5:  Personal history of pulmonary embolism                RECOMMENDATIONS:   1. Continue current treatment  2. Medrol michelle  3. Patient was strongly advised to call 911 if symptoms recur or get worse for any reason  4. Preventive cardiology: Low-salt, low-cholesterol diet, daily exercise, total cholesterol of less than 200, LDL of less than 70,were all advised. 5.  Follow-up with PCP as scheduled  6. Follow-up with Dr. Elsa Shukla in 1 year, sooner if symptomatic for any reason    I have reviewed my findings and recommendations with patient    Electronically signed by Hamilton Ramires MD on 1/3/2022 at 6:29 PM    NOTE: This report was transcribed using voice recognition software.  Every effort was made to ensure accuracy; however, inadvertent computerized transcription errors may be present

## 2022-01-03 ENCOUNTER — OFFICE VISIT (OUTPATIENT)
Dept: CARDIOLOGY CLINIC | Age: 77
End: 2022-01-03
Payer: MEDICARE

## 2022-01-03 VITALS
BODY MASS INDEX: 31.42 KG/M2 | DIASTOLIC BLOOD PRESSURE: 68 MMHG | SYSTOLIC BLOOD PRESSURE: 120 MMHG | WEIGHT: 232 LBS | HEART RATE: 94 BPM | HEIGHT: 72 IN

## 2022-01-03 DIAGNOSIS — I25.10 CORONARY ARTERY DISEASE INVOLVING NATIVE CORONARY ARTERY OF NATIVE HEART WITHOUT ANGINA PECTORIS: Primary | ICD-10-CM

## 2022-01-03 PROCEDURE — G8427 DOCREV CUR MEDS BY ELIG CLIN: HCPCS | Performed by: INTERNAL MEDICINE

## 2022-01-03 PROCEDURE — 4040F PNEUMOC VAC/ADMIN/RCVD: CPT | Performed by: INTERNAL MEDICINE

## 2022-01-03 PROCEDURE — 1123F ACP DISCUSS/DSCN MKR DOCD: CPT | Performed by: INTERNAL MEDICINE

## 2022-01-03 PROCEDURE — 99214 OFFICE O/P EST MOD 30 MIN: CPT | Performed by: INTERNAL MEDICINE

## 2022-01-03 PROCEDURE — G8417 CALC BMI ABV UP PARAM F/U: HCPCS | Performed by: INTERNAL MEDICINE

## 2022-01-03 PROCEDURE — 1036F TOBACCO NON-USER: CPT | Performed by: INTERNAL MEDICINE

## 2022-01-03 PROCEDURE — 93000 ELECTROCARDIOGRAM COMPLETE: CPT | Performed by: INTERNAL MEDICINE

## 2022-01-03 PROCEDURE — G8484 FLU IMMUNIZE NO ADMIN: HCPCS | Performed by: INTERNAL MEDICINE

## 2022-01-03 RX ORDER — FUROSEMIDE 20 MG/1
TABLET ORAL
COMMUNITY
Start: 2021-11-17

## 2022-01-03 RX ORDER — METHYLPREDNISOLONE 4 MG/1
TABLET ORAL
Qty: 1 KIT | Refills: 0 | Status: SHIPPED | OUTPATIENT
Start: 2022-01-03 | End: 2022-01-09

## 2022-01-03 RX ORDER — METOPROLOL SUCCINATE 100 MG/1
100 TABLET, EXTENDED RELEASE ORAL DAILY
Qty: 90 TABLET | Refills: 3 | Status: SHIPPED | OUTPATIENT
Start: 2022-01-03

## 2022-02-05 ENCOUNTER — HOSPITAL ENCOUNTER (INPATIENT)
Age: 77
LOS: 6 days | Discharge: HOME | DRG: 301 | End: 2022-02-12
Attending: EMERGENCY MEDICINE | Admitting: INTERNAL MEDICINE
Payer: MEDICARE

## 2022-02-05 ENCOUNTER — APPOINTMENT (OUTPATIENT)
Dept: ULTRASOUND IMAGING | Age: 77
DRG: 301 | End: 2022-02-05
Payer: MEDICARE

## 2022-02-05 DIAGNOSIS — I82.412 ACUTE DEEP VEIN THROMBOSIS (DVT) OF FEMORAL VEIN OF LEFT LOWER EXTREMITY (HCC): Primary | ICD-10-CM

## 2022-02-05 DIAGNOSIS — N18.32 STAGE 3B CHRONIC KIDNEY DISEASE (HCC): ICD-10-CM

## 2022-02-05 PROBLEM — I10 PRIMARY HYPERTENSION: Status: ACTIVE | Noted: 2022-02-05

## 2022-02-05 LAB
ALBUMIN SERPL-MCNC: 3.8 G/DL (ref 3.5–5.2)
ALP BLD-CCNC: 98 U/L (ref 40–129)
ALT SERPL-CCNC: 21 U/L (ref 0–40)
ANION GAP SERPL CALCULATED.3IONS-SCNC: 12 MMOL/L (ref 7–16)
APTT: 32.2 SEC (ref 24.5–35.1)
AST SERPL-CCNC: 26 U/L (ref 0–39)
BASOPHILS ABSOLUTE: 0 E9/L (ref 0–0.2)
BASOPHILS RELATIVE PERCENT: 0 % (ref 0–2)
BILIRUB SERPL-MCNC: 0.7 MG/DL (ref 0–1.2)
BUN BLDV-MCNC: 27 MG/DL (ref 6–23)
CALCIUM SERPL-MCNC: 9.8 MG/DL (ref 8.6–10.2)
CHLORIDE BLD-SCNC: 105 MMOL/L (ref 98–107)
CO2: 23 MMOL/L (ref 22–29)
CREAT SERPL-MCNC: 1.5 MG/DL (ref 0.7–1.2)
EOSINOPHILS ABSOLUTE: 0.08 E9/L (ref 0.05–0.5)
EOSINOPHILS RELATIVE PERCENT: 1 % (ref 0–6)
GFR AFRICAN AMERICAN: 55
GFR NON-AFRICAN AMERICAN: 45 ML/MIN/1.73
GLUCOSE BLD-MCNC: 100 MG/DL (ref 74–99)
HCT VFR BLD CALC: 53.4 % (ref 37–54)
HEMOGLOBIN: 17.8 G/DL (ref 12.5–16.5)
INR BLD: 1.3
LYMPHOCYTES ABSOLUTE: 0.23 E9/L (ref 1.5–4)
LYMPHOCYTES RELATIVE PERCENT: 3 % (ref 20–42)
MCH RBC QN AUTO: 31.7 PG (ref 26–35)
MCHC RBC AUTO-ENTMCNC: 33.3 % (ref 32–34.5)
MCV RBC AUTO: 95.2 FL (ref 80–99.9)
MONOCYTES ABSOLUTE: 0.7 E9/L (ref 0.1–0.95)
MONOCYTES RELATIVE PERCENT: 9 % (ref 2–12)
NEUTROPHILS ABSOLUTE: 6.79 E9/L (ref 1.8–7.3)
NEUTROPHILS RELATIVE PERCENT: 87 % (ref 43–80)
PDW BLD-RTO: 13.5 FL (ref 11.5–15)
PLATELET # BLD: 98 E9/L (ref 130–450)
PLATELET CONFIRMATION: NORMAL
PMV BLD AUTO: 11.3 FL (ref 7–12)
POLYCHROMASIA: ABNORMAL
POTASSIUM REFLEX MAGNESIUM: 4.2 MMOL/L (ref 3.5–5)
PROTHROMBIN TIME: 14.5 SEC (ref 9.3–12.4)
RBC # BLD: 5.61 E12/L (ref 3.8–5.8)
SODIUM BLD-SCNC: 140 MMOL/L (ref 132–146)
TOTAL PROTEIN: 6.7 G/DL (ref 6.4–8.3)
WBC # BLD: 7.8 E9/L (ref 4.5–11.5)

## 2022-02-05 PROCEDURE — 99222 1ST HOSP IP/OBS MODERATE 55: CPT | Performed by: FAMILY MEDICINE

## 2022-02-05 PROCEDURE — 85025 COMPLETE CBC W/AUTO DIFF WBC: CPT

## 2022-02-05 PROCEDURE — 85730 THROMBOPLASTIN TIME PARTIAL: CPT

## 2022-02-05 PROCEDURE — 99283 EMERGENCY DEPT VISIT LOW MDM: CPT

## 2022-02-05 PROCEDURE — 2580000003 HC RX 258: Performed by: FAMILY MEDICINE

## 2022-02-05 PROCEDURE — G0378 HOSPITAL OBSERVATION PER HR: HCPCS

## 2022-02-05 PROCEDURE — 93005 ELECTROCARDIOGRAM TRACING: CPT | Performed by: STUDENT IN AN ORGANIZED HEALTH CARE EDUCATION/TRAINING PROGRAM

## 2022-02-05 PROCEDURE — 85610 PROTHROMBIN TIME: CPT

## 2022-02-05 PROCEDURE — 6370000000 HC RX 637 (ALT 250 FOR IP): Performed by: FAMILY MEDICINE

## 2022-02-05 PROCEDURE — 80053 COMPREHEN METABOLIC PANEL: CPT

## 2022-02-05 PROCEDURE — 93971 EXTREMITY STUDY: CPT

## 2022-02-05 RX ORDER — TRAZODONE HYDROCHLORIDE 50 MG/1
50 TABLET ORAL NIGHTLY PRN
Status: DISCONTINUED | OUTPATIENT
Start: 2022-02-06 | End: 2022-02-12 | Stop reason: HOSPADM

## 2022-02-05 RX ORDER — PROBENECID AND COLCHICINE 500; .5 MG/1; MG/1
1 TABLET ORAL DAILY
Status: DISCONTINUED | OUTPATIENT
Start: 2022-02-06 | End: 2022-02-12 | Stop reason: HOSPADM

## 2022-02-05 RX ORDER — SODIUM CHLORIDE 9 MG/ML
25 INJECTION, SOLUTION INTRAVENOUS PRN
Status: DISCONTINUED | OUTPATIENT
Start: 2022-02-05 | End: 2022-02-12 | Stop reason: HOSPADM

## 2022-02-05 RX ORDER — ALLOPURINOL 100 MG/1
300 TABLET ORAL DAILY
Status: DISCONTINUED | OUTPATIENT
Start: 2022-02-06 | End: 2022-02-12 | Stop reason: HOSPADM

## 2022-02-05 RX ORDER — METHIMAZOLE 5 MG/1
7.5 TABLET ORAL DAILY
Status: DISCONTINUED | OUTPATIENT
Start: 2022-02-06 | End: 2022-02-12 | Stop reason: HOSPADM

## 2022-02-05 RX ORDER — METOPROLOL SUCCINATE 25 MG/1
100 TABLET, EXTENDED RELEASE ORAL DAILY
Status: DISCONTINUED | OUTPATIENT
Start: 2022-02-06 | End: 2022-02-12 | Stop reason: HOSPADM

## 2022-02-05 RX ORDER — LEVETIRACETAM 500 MG/1
500 TABLET ORAL 2 TIMES DAILY
Status: DISCONTINUED | OUTPATIENT
Start: 2022-02-05 | End: 2022-02-12 | Stop reason: HOSPADM

## 2022-02-05 RX ORDER — ONDANSETRON 2 MG/ML
4 INJECTION INTRAMUSCULAR; INTRAVENOUS EVERY 6 HOURS PRN
Status: DISCONTINUED | OUTPATIENT
Start: 2022-02-05 | End: 2022-02-12 | Stop reason: HOSPADM

## 2022-02-05 RX ORDER — ACETAMINOPHEN 650 MG/1
650 SUPPOSITORY RECTAL EVERY 6 HOURS PRN
Status: DISCONTINUED | OUTPATIENT
Start: 2022-02-05 | End: 2022-02-12 | Stop reason: HOSPADM

## 2022-02-05 RX ORDER — SODIUM CHLORIDE 9 MG/ML
25 INJECTION, SOLUTION INTRAVENOUS PRN
Status: CANCELLED | OUTPATIENT
Start: 2022-02-05

## 2022-02-05 RX ORDER — LISINOPRIL 10 MG/1
20 TABLET ORAL DAILY
Status: DISCONTINUED | OUTPATIENT
Start: 2022-02-06 | End: 2022-02-12 | Stop reason: HOSPADM

## 2022-02-05 RX ORDER — MECOBALAMIN 5000 MCG
10 TABLET,DISINTEGRATING ORAL NIGHTLY
Status: DISCONTINUED | OUTPATIENT
Start: 2022-02-06 | End: 2022-02-12 | Stop reason: HOSPADM

## 2022-02-05 RX ORDER — SENNA PLUS 8.6 MG/1
1 TABLET ORAL DAILY PRN
Status: DISCONTINUED | OUTPATIENT
Start: 2022-02-05 | End: 2022-02-12 | Stop reason: HOSPADM

## 2022-02-05 RX ORDER — SODIUM CHLORIDE 0.9 % (FLUSH) 0.9 %
5-40 SYRINGE (ML) INJECTION PRN
Status: DISCONTINUED | OUTPATIENT
Start: 2022-02-05 | End: 2022-02-12 | Stop reason: HOSPADM

## 2022-02-05 RX ORDER — VITAMIN B COMPLEX
2000 TABLET ORAL DAILY
Status: DISCONTINUED | OUTPATIENT
Start: 2022-02-06 | End: 2022-02-12 | Stop reason: HOSPADM

## 2022-02-05 RX ORDER — SODIUM CHLORIDE 0.9 % (FLUSH) 0.9 %
5-40 SYRINGE (ML) INJECTION PRN
Status: CANCELLED | OUTPATIENT
Start: 2022-02-05

## 2022-02-05 RX ORDER — ACETAMINOPHEN 325 MG/1
650 TABLET ORAL EVERY 6 HOURS PRN
Status: DISCONTINUED | OUTPATIENT
Start: 2022-02-05 | End: 2022-02-12 | Stop reason: HOSPADM

## 2022-02-05 RX ORDER — ONDANSETRON 4 MG/1
4 TABLET, ORALLY DISINTEGRATING ORAL EVERY 8 HOURS PRN
Status: DISCONTINUED | OUTPATIENT
Start: 2022-02-05 | End: 2022-02-12 | Stop reason: HOSPADM

## 2022-02-05 RX ORDER — SODIUM CHLORIDE 0.9 % (FLUSH) 0.9 %
5-40 SYRINGE (ML) INJECTION EVERY 12 HOURS SCHEDULED
Status: DISCONTINUED | OUTPATIENT
Start: 2022-02-05 | End: 2022-02-12 | Stop reason: HOSPADM

## 2022-02-05 RX ADMIN — Medication 10 ML: at 23:42

## 2022-02-05 RX ADMIN — LEVETIRACETAM 500 MG: 500 TABLET, FILM COATED ORAL at 23:42

## 2022-02-05 ASSESSMENT — ENCOUNTER SYMPTOMS
ABDOMINAL PAIN: 0
EYE PAIN: 0
WHEEZING: 0
SINUS PRESSURE: 0
SHORTNESS OF BREATH: 0
NAUSEA: 0
EYE DISCHARGE: 0
DIARRHEA: 0
VOMITING: 0
BACK PAIN: 0
SORE THROAT: 0
COUGH: 0

## 2022-02-06 ENCOUNTER — APPOINTMENT (OUTPATIENT)
Dept: CT IMAGING | Age: 77
DRG: 301 | End: 2022-02-06
Payer: MEDICARE

## 2022-02-06 ENCOUNTER — APPOINTMENT (OUTPATIENT)
Dept: INTERVENTIONAL RADIOLOGY/VASCULAR | Age: 77
DRG: 301 | End: 2022-02-06
Payer: MEDICARE

## 2022-02-06 LAB
ANION GAP SERPL CALCULATED.3IONS-SCNC: 10 MMOL/L (ref 7–16)
BUN BLDV-MCNC: 26 MG/DL (ref 6–23)
CALCIUM SERPL-MCNC: 9.5 MG/DL (ref 8.6–10.2)
CHLORIDE BLD-SCNC: 106 MMOL/L (ref 98–107)
CO2: 24 MMOL/L (ref 22–29)
CREAT SERPL-MCNC: 1.4 MG/DL (ref 0.7–1.2)
EKG ATRIAL RATE: 71 BPM
EKG P AXIS: 64 DEGREES
EKG P-R INTERVAL: 322 MS
EKG Q-T INTERVAL: 384 MS
EKG QRS DURATION: 102 MS
EKG QTC CALCULATION (BAZETT): 417 MS
EKG R AXIS: -19 DEGREES
EKG T AXIS: 63 DEGREES
EKG VENTRICULAR RATE: 71 BPM
GFR AFRICAN AMERICAN: 60
GFR NON-AFRICAN AMERICAN: 49 ML/MIN/1.73
GLUCOSE BLD-MCNC: 117 MG/DL (ref 74–99)
POTASSIUM REFLEX MAGNESIUM: 4.3 MMOL/L (ref 3.5–5)
SODIUM BLD-SCNC: 140 MMOL/L (ref 132–146)

## 2022-02-06 PROCEDURE — 6370000000 HC RX 637 (ALT 250 FOR IP): Performed by: FAMILY MEDICINE

## 2022-02-06 PROCEDURE — 36415 COLL VENOUS BLD VENIPUNCTURE: CPT

## 2022-02-06 PROCEDURE — 2709999900 IR GUIDED IVC FILTER PLACEMENT

## 2022-02-06 PROCEDURE — 6360000004 HC RX CONTRAST MEDICATION: Performed by: RADIOLOGY

## 2022-02-06 PROCEDURE — 93010 ELECTROCARDIOGRAM REPORT: CPT | Performed by: INTERNAL MEDICINE

## 2022-02-06 PROCEDURE — 99232 SBSQ HOSP IP/OBS MODERATE 35: CPT | Performed by: INTERNAL MEDICINE

## 2022-02-06 PROCEDURE — 2060000000 HC ICU INTERMEDIATE R&B

## 2022-02-06 PROCEDURE — 37191 INS ENDOVAS VENA CAVA FILTR: CPT

## 2022-02-06 PROCEDURE — 80048 BASIC METABOLIC PNL TOTAL CA: CPT

## 2022-02-06 PROCEDURE — 06H03DZ INSERTION OF INTRALUMINAL DEVICE INTO INFERIOR VENA CAVA, PERCUTANEOUS APPROACH: ICD-10-PCS | Performed by: FAMILY MEDICINE

## 2022-02-06 PROCEDURE — 2580000003 HC RX 258: Performed by: FAMILY MEDICINE

## 2022-02-06 PROCEDURE — 70450 CT HEAD/BRAIN W/O DYE: CPT

## 2022-02-06 RX ADMIN — Medication 2000 UNITS: at 08:15

## 2022-02-06 RX ADMIN — IOPAMIDOL 10 ML: 612 INJECTION, SOLUTION INTRAVENOUS at 13:19

## 2022-02-06 RX ADMIN — PROBENECID AND COLCHICINE 1 TABLET: 500; .5 TABLET ORAL at 20:00

## 2022-02-06 RX ADMIN — ACETAMINOPHEN 650 MG: 325 TABLET ORAL at 07:43

## 2022-02-06 RX ADMIN — Medication 10 ML: at 08:15

## 2022-02-06 RX ADMIN — TRAZODONE HYDROCHLORIDE 50 MG: 50 TABLET ORAL at 22:57

## 2022-02-06 RX ADMIN — Medication 10 MG: at 00:08

## 2022-02-06 RX ADMIN — METOPROLOL SUCCINATE 100 MG: 25 TABLET, FILM COATED, EXTENDED RELEASE ORAL at 08:15

## 2022-02-06 RX ADMIN — Medication 10 ML: at 20:01

## 2022-02-06 RX ADMIN — METHIMAZOLE 7.5 MG: 5 TABLET ORAL at 08:14

## 2022-02-06 RX ADMIN — ALLOPURINOL 300 MG: 100 TABLET ORAL at 08:15

## 2022-02-06 RX ADMIN — LEVETIRACETAM 500 MG: 500 TABLET, FILM COATED ORAL at 20:00

## 2022-02-06 RX ADMIN — Medication 10 MG: at 20:00

## 2022-02-06 RX ADMIN — LEVETIRACETAM 500 MG: 500 TABLET, FILM COATED ORAL at 08:15

## 2022-02-06 RX ADMIN — LISINOPRIL 20 MG: 10 TABLET ORAL at 08:14

## 2022-02-06 ASSESSMENT — PAIN DESCRIPTION - PAIN TYPE: TYPE: ACUTE PAIN

## 2022-02-06 ASSESSMENT — PAIN DESCRIPTION - DESCRIPTORS: DESCRIPTORS: HEADACHE

## 2022-02-06 ASSESSMENT — PAIN SCALES - GENERAL
PAINLEVEL_OUTOF10: 6
PAINLEVEL_OUTOF10: 5
PAINLEVEL_OUTOF10: 4
PAINLEVEL_OUTOF10: 0

## 2022-02-06 ASSESSMENT — PAIN DESCRIPTION - LOCATION: LOCATION: HEAD

## 2022-02-06 NOTE — PROGRESS NOTES
Patient came down to specials procedures for IVC filter placement. Instructions given and questions answered. Consent signed. Patient alert and oriented, respirations even, easy, unlabored. Procedure explained to patient by Dr. Kirill Sow, questions were answered. Patient positioned and prepped on table, secured with strap. Emotional support given. 1241 start procedure /72  72  18  97% on room air    1256 end procedure /71  68  16  96% on room air      Patient tolerated procedure. Pressure held for five minutes to right groin. Dry sterile dressing of 4x4 and tegaderm applied to right  groin, CDI. Educated patient to keep head down and right leg straight and flat. Patient voiced understanding. Vital signs stable.        Nurse to nurse called to floor spoke with [facility] PAUL

## 2022-02-06 NOTE — CARE COORDINATION
2/6/22 1617 CM note: NO COVID TESTING THIS ADMIT, previously covid (+) 9/6/21. [place]. Met with patient at the bedside to discuss transition of care at discharge. Pt resides with his wife and youngest daughter in a 1 floor 4 steps to enter home. Pt is independent with ADLs, drives, and has DME (WW, BSC, cane, shower chair) but he does not use it. Pt has had home O2 through [facility]  (after covid) but that was sent back after a few weeks when he no longer needed it. Pts PCP is Saba Shoemaker and his pharmacy is [facility] in [place]. Pt has a hx HHC (after his brain bleed and covid both) with [facility]; no hx SNF. Discharge plan is home and pt declines need for HHC. Will await therapy recommendations. Pts family will provide transportation home.  Electronically signed by Ivette Patel RN on 2/6/2022 at 4:27 PM

## 2022-02-06 NOTE — PLAN OF CARE
Problem: Venous Thromboembolism:  Goal: Will show no signs or symptoms of venous thromboembolism  Description: Will show no signs or symptoms of venous thromboembolism  Outcome: Met This Shift  Goal: Absence of signs or symptoms of impaired coagulation  Description: Absence of signs or symptoms of impaired coagulation  Outcome: Met This Shift

## 2022-02-06 NOTE — PROGRESS NOTES
Admitting Date and Time: 2/5/2022  9:05 PM  Admit Dx: Acute deep vein thrombosis (DVT) of femoral vein of left lower extremity (HCC) [I82.412]    Subjective:    Pt feels well  Per RN: she called me since ivc filter procedure was going to be delayed until 2/7 see below    ROS: denies fever, chills, cp, sob, n/v, HA unless stated above.      sodium chloride flush  5-40 mL IntraVENous 2 times per day    allopurinol  300 mg Oral Daily    colchicine-probenecid  1 tablet Oral Daily    levETIRAcetam  500 mg Oral BID    lisinopril  20 mg Oral Daily    methIMAzole  7.5 mg Oral Daily    metoprolol succinate  100 mg Oral Daily    Vitamin D  2,000 Units Oral Daily    melatonin  10 mg Oral Nightly     sodium chloride flush, 5-40 mL, PRN  sodium chloride, 25 mL, PRN  ondansetron, 4 mg, Q8H PRN   Or  ondansetron, 4 mg, Q6H PRN  acetaminophen, 650 mg, Q6H PRN   Or  acetaminophen, 650 mg, Q6H PRN  senna, 1 tablet, Daily PRN  traZODone, 50 mg, Nightly PRN         Objective:    /70   Pulse 73   Temp 97.9 °F (36.6 °C) (Oral)   Resp 18   Ht 6' (1.829 m)   Wt 230 lb (104.3 kg)   SpO2 95%   BMI 31.19 kg/m²   General Appearance: alert and oriented to person, place and time and in no acute distress  Skin: warm and dry  Head: normocephalic and atraumatic  Eyes: pupils equal, round, and reactive to light, extraocular eye movements intact, conjunctivae normal  Neck: neck supple and non tender without mass   Pulmonary/Chest: clear to auscultation bilaterally- no wheezes, rales or rhonchi, normal air movement, no respiratory distress  Cardiovascular: normal rate, normal S1 and S2 and no carotid bruits  Abdomen: soft, non-tender, non-distended, normal bowel sounds, no masses or organomegaly  Extremities: no cyanosis, no clubbing and no edema  Neurologic: no cranial nerve deficit and speech normal      Recent Labs     02/05/22  2216 02/06/22  0752    140   K 4.2 4.3    106   CO2 23 24   BUN 27* 26*   CREATININE 1. 5* 1.4*   GLUCOSE 100* 117*   CALCIUM 9.8 9.5       Recent Labs     02/05/22  2216   ALKPHOS 98   PROT 6.7   LABALBU 3.8   BILITOT 0.7   AST 26   ALT 21       Recent Labs     02/05/22  2216   WBC 7.8   RBC 5.61   HGB 17.8*   HCT 53.4   MCV 95.2   MCH 31.7   MCHC 33.3   RDW 13.5   PLT 98*   MPV 11.3           Radiology:   US DUP LOWER EXTREMITY LEFT KARL   Final Result   Deep venous thrombosis extending from the left mid superficial femoral vein   through the popliteal and involving the posterior tibial veins. Findings discussed on 02/05/2022 with Dr. Emma Medel at 10 p.m. IR GUIDED IVC FILTER PLACEMENT    (Results Pending)       Assessment:  Principal Problem:    Acute deep vein thrombosis (DVT) of femoral vein of left lower extremity (HCC)  Active Problems:    CKD (chronic kidney disease) stage 3, GFR 30-59 ml/min    Hyperthyroidism    Coronary artery disease involving native coronary artery of native heart    Vitamin D deficiency    Class 1 obesity due to excess calories without serious comorbidity with body mass index (BMI) of 31.0 to 31.9 in adult    Primary hypertension  Resolved Problems:    * No resolved hospital problems. *      Plan: History of present illness from History and Physical:  68 y.o. male with a history of DVT/PPE, traumatic SDH with hemorrhagic CVA, HTN, HLD, hyperthyroidism, NSTEMI, CAD presents with left leg swelling and pain since yesterday. Noticed posterior left calf cramping pain, and his daughter who is a nurse told him he probably had another DVT and that he should go to ED. Has been off anticoagulation due to fall with SDH, had ellie hole in June and has last followup with neurosurgeon next week. Reports he has been much less active than usual due to [address] then COVID, then pneumonia, then bad winter weather. Workup in ED revealed DVT. Vascular surgery (Dr Kalina Mcadams) called from ED, recommended having IR do IVC due to lifetime contraindication to anticoagulation.   Labs significant for creatinine 1.5 (baseline 1.3-1.4), hgb 17.8. 1. DVT. Contraindication to anticoagulation. 2/6 d/w IR Dr Aditya Palomino intially was concerned about not being able to get this done today. Meanwhile I consulted Dr Daniele Riley to help with timing of ivc filter vs heparin with h/o intracranial bleed. Then IR was able to perform procedure today  2. Thrombocytopenia, mild. Monitor. 3. CKD stage 3. Avoid nephrotoxic agents. 4. HTN. Continue home lisinopril, metoprolol, hold furosemide. 5. Hyperthyroidism on methimazole. 6. Vitamin D deficiency. Continue home replacement. 7. Full code  8. DVT prophylaxis: no anticoagulation, no SCD on left leg    Once the IVC filter was placed on 2/6 I spoke to Dr. Daniele Riley for an opinion on anticoagulation. He speculated that we could start aspirin but then get an opinion from neurosurgery Dr. Gilles Bush. I spoke to Dr. Gilles Bush who asked for a CAT scan in order to make his recommendation about anticoagulation. Full systemic anticoagulation with Eliquis or heparin should be okay if his new CAT scan is okay per Dr. Gilles Bush    NOTE: This report was transcribed using voice recognition software. Every effort was made to ensure accuracy; however, inadvertent computerized transcription errors may be present.      Electronically signed by Qi De Guzman MD on 2/6/2022 at 10:17 AM

## 2022-02-06 NOTE — CONSULTS
REASON FOR CONSULT:       DVT Left leg    HISTORY:    Mr. Aggie Gasca is a 68 y.o. obese male who presented to the ER on 02/05/2022 for evaluation on an acute onset of left leg pain. He had a US of the leg and was diagnosed wit an Acute DVT of the Left Superficial Femoral Vein, Popliteal Vein and Posterior Tibial Veins. He was seen and inteviewed on his hospital bed. He was comfortable and in NAD. His only c/o was that of mild pain/discomfort on the left popliteal fossa/upper left calf. He denies chest pain or dyspnea. He has a prior history of Bilateral Pulmonary Embolism, and known to have CAD and s/p PCI - Stents,  and had been taking Plavix until June 2021 when he sustained a head trauma and had a \"[place] Hole\" to drain a Subdural hematoma. Due to his admitting diagnosis of DVT and concerns for a Pulmonary Embolism, he was consulted with Vascular  Surgery Service, for consideration of an IVC Filter placement given a prior history of a subdural hematoma. REVIEW OF SYSTEMS:   Constitutional: Negative for chills and fever. HENT: Negative for ear pain, sinus pressure and sore throat. Eyes: Negative for pain and discharge. Respiratory: Negative for cough, shortness of breath and wheezing. Cardiovascular: Positive for leg swelling. Negative for chest pain. Gastrointestinal: Negative for abdominal pain, diarrhea, nausea and vomiting. Genitourinary: Negative for dysuria and frequency. Musculoskeletal: Negative for arthralgias and back pain. Skin: Negative for rash and wound. Neurological: Negative for weakness and headaches. Hematological: Negative for adenopathy. All other systems reviewed and are negative.         PAST HISTORY:    Past Medical History:   Diagnosis Date    Bilateral renal cysts 10/13/2014    CAD (coronary artery disease)      Cardiomy[facility]      Cerebral artery occlusion with cerebral infarction [facility])      Chest pain       8-4-2016 lexiscan stress    Hemorrhagic stroke [facility])      History of gout 10/13/2014    History of pulmonary embolism 10/13/2014    Hyperlipidemia      Hypertension      Hyperthyroidism 10/13/2014    NSTEMI (non-ST elevated myocardial infarction) [address] 10/13/2014    Obesity due to excess calories      Thyroid cyst 10/13/2014    Thyroid disease         Past Surgical History:   Procedure Laterality Date    BACK SURGERY        CORONARY ANGIOPLASTY WITH STENT PLACEMENT   10-     Dr. Stephany Hawk- [place] 3.0x18 MID LAD    CRANIOTOMY Right 6/25/2021     RIGHT FRONTAL REMINGTON HOLE TO DRAIN HEMATOMA AND PLACEMENT OF SUBDURAL DRAIN performed by Noris Lockhart MD at [address] CATH LAB PROCEDURE   10/13/2014     Dr. Hammond Range: PCI to LAD          SOCIAL HISTORY:   reports that he has never smoked. He has never used smokeless tobacco. He reports current alcohol use. He reports that he does not use drugs.     FAMILY HISTORY:  Parents: Heart disease.     ALLERGIES:   NO KNOWN ALLERGIES    MEDICATIONS:    Scheduled Medications    sodium chloride flush  5-40 mL IntraVENous 2 times per day    allopurinol  300 mg Oral Daily    colchicine-probenecid  1 tablet Oral Daily    levETIRAcetam  500 mg Oral BID    lisinopril  20 mg Oral Daily    methIMAzole  7.5 mg Oral Daily    metoprolol succinate  100 mg Oral Daily    Vitamin D  2,000 Units Oral Daily    melatonin  10 mg Oral Nightly         PRN Medications    sodium chloride flush, 5-40 mL, PRN  sodium chloride, 25 mL, PRN  ondansetron, 4 mg, Q8H PRN   Or  ondansetron, 4 mg, Q6H PRN  acetaminophen, 650 mg, Q6H PRN   Or  acetaminophen, 650 mg, Q6H PRN  senna, 1 tablet, Daily PRN  traZODone, 50 mg, Nightly PRN     PHYSICAL EXAM:    /70   Pulse 73   Temp 97.9 °F (36.6 °C) (Oral)   Resp 18   Ht 6' (1.829 m)   Wt 230 lb (104.3 kg)   SpO2 95%   BMI 31.19 kg/m²     General Appearance: alert and oriented to person, place and time and in no acute distress  Skin: warm and dry  Head: normocephalic and atraumatic  Eyes: pupils equal, round, and reactive to light, extraocular eye movements intact, conjunctivae normal  Neck: neck supple and non tender without mass   Pulmonary/Chest: clear to auscultation bilaterally- no wheezes, rales or rhonchi, normal air movement, no respiratory distress  Cardiovascular: normal rate, normal S1 and S2 and no carotid bruits  Abdomen: Protuberant, soft, non-tender, non-distended, normal bowel sounds. Extremities: no cyanosis, no clubbing and no edema. Mild pain over the popliteal fossa and upper calf  Neurologic: no cranial nerve deficit and speech normal    LAB. DATA:   Recent Labs     02/05/22 2216 02/06/22  0752    140   K 4.2 4.3    106   CO2 23 24   BUN 27* 26*   CREATININE 1.5* 1.4*   GLUCOSE 100* 117*   CALCIUM 9.8 9.5             Recent Labs     02/05/22 2216   ALKPHOS 98   PROT 6.7   LABALBU 3.8   BILITOT 0.7   AST 26   ALT 21             Recent Labs     02/05/22 2216   WBC 7.8   RBC 5.61   HGB 17.8*   HCT 53.4   MCV 95.2   MCH 31.7   MCHC 33.3   RDW 13.5   PLT 98*   MPV 11.3     RADIOLOGY DATA:    Duplex Venous US left lower extremity (02/05/2022)  Impression   Deep venous thrombosis extending from the left mid superficial femoral vein   through the popliteal and involving the posterior tibial veins.           ASSESSMENT  / PLAN OF CARE:     - DEEP VEIN THROMBOSIS, LEFT LOWER EXTREMITY, ACUTE        Due to prior history of a Bilateral Pulmonary Embolism (following back surgery), ideally he should be placed on oral anticoagulation. However there is a concern of   an INTRACRANIAL BLEED (SUBDURAL HEMATOMA) in June 2021, that required a \"[place] Hole\". Though we must take into account that this was a provoked (head trauma) event. As of now, the placement of an IVC filter is highly recommended  and defer the approval of oral anticoagulation to the recommendations of his Neurosurgeon (Dr. Giovani Alonso).    Mr. Joel Hinson was informed and explained of the diagnosis of DVT and potential complications of as well as the recommendations for a placement of an IVC filter by IR (already done). Will follow as needed.

## 2022-02-06 NOTE — H&P
[address] [place], [place] [address]ist Group   History and Physical      CHIEF COMPLAINT:  Leg swelling    History of Present Illness:  68 y.o. male with a history of DVT/PPE, traumatic SDH with hemorrhagic CVA, HTN, HLD, hyperthyroidism, NSTEMI, CAD presents with left leg swelling and pain since yesterday. Noticed posterior left calf cramping pain, and his daughter who is a nurse told him he probably had another DVT and that he should go to ED. Has been off anticoagulation due to fall with SDH, had ellie hole in June and has last followup with neurosurgeon next week. Reports he has been much less active than usual due to [address] then COVID, then pneumonia, then bad winter weather. Workup in ED revealed DVT. Vascular surgery (Dr Miranda Mejia) called from ED, recommended having IR do IVC due to lifetime contraindication to anticoagulation. Labs significant for creatinine 1.5 (baseline 1.3-1.4), hgb 17.8. Informant(s) for H&P: patient    REVIEW OF SYSTEMS:  no fevers, chills, cp, sob, n/v, ha, vision/hearing changes, wt changes, hot/cold flashes, other open skin lesions, diarrhea, constipation, dysuria/hematuria unless noted in HPI. Complete ROS performed with the patient and is otherwise negative.       PMH:  Past Medical History:   Diagnosis Date    Bilateral renal cysts 10/13/2014    CAD (coronary artery disease)     Cardiomyopathy ([address]     Cerebral artery occlusion with cerebral infarction ([address]     Chest pain     8-4-2016 lexiscan stress    Hemorrhagic stroke ([address]     History of gout 10/13/2014    History of pulmonary embolism 10/13/2014    Hyperlipidemia     Hypertension     Hyperthyroidism 10/13/2014    NSTEMI (non-ST elevated myocardial infarction) ([address] 10/13/2014    Obesity due to excess calories     Thyroid cyst 10/13/2014    Thyroid disease        Surgical History:  Past Surgical History:   Procedure Laterality Date    BACK SURGERY      CORONARY ANGIOPLASTY WITH STENT PLACEMENT  10-    Dr. Saray Soliman- Ivan Jane 3.0x18 MID LAD    CRANIOTOMY Right 6/25/2021    RIGHT FRONTAL REMINGTON HOLE TO DRAIN HEMATOMA AND PLACEMENT OF SUBDURAL DRAIN performed by Morteza Clements MD at [address] [facility] CATH LAB PROCEDURE  10/13/2014    Dr. Saray Soliman: PCI to LAD       Medications Prior to Admission:    Prior to Admission medications    Medication Sig Start Date End Date Taking? Authorizing Provider   furosemide (LASIX) 20 MG tablet TAKE ONE TABLET BY MOUTH DAILY 11/17/21   Historical Provider, MD   metoprolol succinate (TOPROL XL) 100 MG extended release tablet Take 1 tablet by mouth daily 1/3/22   Ann Pena MD   vitamin B-6 (B-6) 50 MG tablet Take 1 tablet by mouth daily 9/12/21   Staci Peres MD   Vitamin D (CHOLECALCIFEROL) 50 MCG (2000 UT) TABS tablet Take 1 tablet by mouth daily 9/12/21   Staci Peres MD   zinc sulfate (ZINCATE) 220 (50 Zn) MG capsule Take 1 capsule by mouth daily 9/12/21   Staci Peres MD   ascorbic acid (VITAMIN C) 500 MG tablet Take 1 tablet by mouth 2 times daily 9/11/21   Staci Peres MD   levETIRAcetam (KEPPRA) 500 MG tablet Take 1 tablet by mouth 2 times daily 8/25/21   Morteza Clements MD   methIMAzole (TAPAZOLE) 5 MG tablet Take 1.5 tablets by mouth daily 1.5 tabs 7/22/21   Cait De Santiago MD   lisinopril (PRINIVIL;ZESTRIL) 20 MG tablet Take 20 mg by mouth daily     Historical Provider, MD   colchicine-probenecid 0.5-500 MG per tablet TAKE ONE TABLET BY MOUTH EVERY DAY 10/7/20   Historical Provider, MD   hydrochlorothiazide (MICROZIDE) 12.5 MG capsule Take 12.5 mg by mouth daily    Historical Provider, MD   nitroGLYCERIN (NITROSTAT) 0.4 MG SL tablet Place 1 tablet under the tongue every 5 minutes as needed for Chest pain. 10/14/14   Ann Pena MD   allopurinol (ZYLOPRIM) 300 MG tablet Take 300 mg by mouth daily. Historical Provider, MD       Allergies:    Patient has no known allergies.     Social History: reports that he has never smoked. He has never used smokeless tobacco. He reports current alcohol use. He reports that he does not use drugs. Family History:   family history includes Heart Disease in his father and mother. PHYSICAL EXAM:  Vitals:  BP (!) 146/68   Pulse 76   Temp 97.5 °F (36.4 °C) (Infrared)   Resp 20   Ht 6' (1.829 m)   Wt 230 lb (104.3 kg)   SpO2 97%   BMI 31.19 kg/m²     Constitutional:  Elderly,NAD, awake, alert  Eyes: no scleral icterus, normal lids, no discharge  ENMT:  Normocephalic, atraumatic, mucosa moist, EOMI  Neck:  trachea midline, no JVD  Lungs:  CTA bilaterally, no audible rhonchi or wheezes noted, respirations unlabored, no retractions  Heart[de-identified]  RRR, no murmur, rub, or gallop noted during exam  Abd:  Soft, non tender, non distended, bowel sounds present  :  deferred  MSK: sarcopenia absent  Ext:  Moving all extremities, LLE enlarged compared to RLE without pitting edema, pulses present  Skin:  Warm and dry, no rashes on visible skin, LLE darker colored compared to RLE  Psych: non-anxious affect  Neuro:  PERRL, Alert, grossly nonfocal; following commands    LABS:  No results for input(s): NA, K, CL, CO2, BUN, CREATININE, GLUCOSE, CALCIUM in the last 72 hours. No results for input(s): WBC, RBC, HGB, HCT, MCV, MCH, MCHC, RDW, PLT, MPV in the last 72 hours. No results for input(s): POCGLU in the last 72 hours. Radiology: US DUP LOWER EXTREMITY LEFT KARL    Result Date: 2/5/2022  EXAMINATION: DUPLEX VENOUS ULTRASOUND OF THE LEFT LOWER EXTREMITY 2/5/2022 9:22 pm TECHNIQUE: Duplex ultrasound using B-mode/gray scaled imaging and Doppler spectral analysis and color flow was obtained of the deep venous structures of the left lower extremity. COMPARISON: None.  HISTORY: ORDERING SYSTEM PROVIDED HISTORY: r/o dvt TECHNOLOGIST PROVIDED HISTORY: Reason for exam:->r/o dvt What reading provider will be dictating this exam?->CRC FINDINGS: Deep venous thrombosis extending

## 2022-02-06 NOTE — ED PROVIDER NOTES
79-year-old male presented to the emergency department for left-sided leg pain, ongoing 1 day, concerned by a possible blood clot he is to have been the past, they were provoked, states he was on a blood thinner for a long time, however last year he was knocked down in high school football game and suffered a subdural series taken off his blood thinner. He denies any recent travel, injuries, surgeries, history of blood clots, denies any trauma to the leg, denies any weakness or numbness in that leg, denies any coolness to the leg. Pain is 1 out of 10 severity, gradual onset, associate with some leg swelling, nothing makes it better, nothing is worse, mild in severity, has been constant           Review of Systems   Constitutional: Negative for chills and fever. HENT: Negative for ear pain, sinus pressure and sore throat. Eyes: Negative for pain and discharge. Respiratory: Negative for cough, shortness of breath and wheezing. Cardiovascular: Positive for leg swelling. Negative for chest pain. Gastrointestinal: Negative for abdominal pain, diarrhea, nausea and vomiting. Genitourinary: Negative for dysuria and frequency. Musculoskeletal: Negative for arthralgias and back pain. Skin: Negative for rash and wound. Neurological: Negative for weakness and headaches. Hematological: Negative for adenopathy. All other systems reviewed and are negative. Physical Exam  Vitals and nursing note reviewed. Constitutional:       Appearance: He is well-developed. HENT:      Head: Normocephalic and atraumatic. Right Ear: External ear normal.      Left Ear: External ear normal.      Nose: Nose normal.      Mouth/Throat:      Mouth: Mucous membranes are moist.   Eyes:      Extraocular Movements: Extraocular movements intact. Conjunctiva/sclera: Conjunctivae normal.      Pupils: Pupils are equal, round, and reactive to light.    Cardiovascular:      Rate and Rhythm: Normal rate and regular department for left leg pain, ongoing for 1 day, found to have a superficial femoral vein DVT, pulses were intact, neurovascularly intact in that limb, he had no complaints of shortness of breath or chest pain, was not tachycardic. Patient has lifetime contraindication to anticoagulation due to prior hemorrhagic CVA as well as subdural that required bur hole craniotomy in the past, patient was admitted for IVC filter placement. Stable during his emergency department stay. ED Course as of 02/05/22 2215   Sat Feb 05, 2022 2103 Patient is stable at this time. [JG]   5698 Will speak to vascular surgery but patient's DVT [JG]   2213 Discussed case with , agrees patient would benefit from an IVC filter, recommend that IR perform this here as opposed to having the patient transferred. [JG]   2213 Patient is agreeable to admission, he is stable at this time. [JG]   2213 Patient presented emergency department for left leg pain, ongoing for 1 day, found to have a superficial femoral vein DVT, pulses were intact, neurovascularly intact in that limb, he had no complaints of shortness of breath or chest pain, was not tachycardic. Patient has lifetime contraindication to anticoagulation due to prior hemorrhagic CVA as well as subdural that required bur hole craniotomy in the past, patient was admitted for IVC filter placement. Stable during his emergency department stay.  [JG]      ED Course User Index  [JG] Lisa Corona MD       --------------------------------------------- PAST HISTORY ---------------------------------------------  Past Medical History:  has a past medical history of Bilateral renal cysts, CAD (coronary artery disease), Cardiomyopathy ([facility] Cerebral artery occlusion with cerebral infarction [facility]), Chest pain, [facility] History of gout, History of pulmonary embolism, Hyperlipidemia, Hypertension, Hyperthyroidism, NSTEMI (non-ST elevated myocardial infarction) ([facility] Obesity due to excess calories, Thyroid cyst, and Thyroid disease. Past Surgical History:  has a past surgical history that includes back surgery; Coronary angioplasty with stent (10-); Diagnostic Cardiac Cath Lab Procedure (10/13/2014); and craniotomy (Right, 6/25/2021). Social History:  reports that he has never smoked. He has never used smokeless tobacco. He reports current alcohol use. He reports that he does not use drugs. Family History: family history includes Heart Disease in his father and mother. The patients home medications have been reviewed. Allergies: Patient has no known allergies. -------------------------------------------------- RESULTS -------------------------------------------------    LABS:  No results found for this visit on 02/05/22. RADIOLOGY:  US DUP LOWER EXTREMITY LEFT KARL   Final Result   Deep venous thrombosis extending from the left mid superficial femoral vein   through the popliteal and involving the posterior tibial veins. Findings discussed on 02/05/2022 with Dr. Za De La Garza at 10 p.m. EKG:  This EKG is signed and interpreted by me. Rate: 71  Rhythm: Sinus  Interpretation: 1st degree AV block  Comparison: stable as compared to patient's most recent EKG      ------------------------- NURSING NOTES AND VITALS REVIEWED ---------------------------  Date / Time Roomed:  2/5/2022  9:05 PM  ED Bed Assignment:  25/25    The nursing notes within the ED encounter and vital signs as below have been reviewed.      Patient Vitals for the past 24 hrs:   BP Temp Temp src Pulse Resp SpO2 Height Weight   02/05/22 2040 (!) 146/68 -- -- -- -- -- -- 230 lb (104.3 kg)   02/05/22 2038 -- 97.5 °F (36.4 °C) Infrared 76 20 97 % 6' (1.829 m) 230 lb (104.3 kg)       Oxygen Saturation Interpretation: Normal    ------------------------------------------ PROGRESS NOTES ------------------------------------------      Counseling:  I have spoken with the patient and discussed todays results, in addition to providing specific details for the plan of care and counseling regarding the diagnosis and prognosis. Their questions are answered at this time and they are agreeable with the plan of admission.    --------------------------------- ADDITIONAL PROVIDER NOTES ---------------------------------  Consultations:  Time: 2213. Spoke with Dr. Ervin Davison. Discussed case. They will admit the patient. This patient's ED course included: a personal history and physicial examination, re-evaluation prior to disposition, multiple bedside re-evaluations, cardiac monitoring and continuous pulse oximetry    This patient has remained hemodynamically stable during their ED course. Diagnosis:  1. Acute deep vein thrombosis (DVT) of femoral vein of left lower extremity (HCC)        Disposition:  Patient's disposition: Admit to telemetry  Patient's condition is stable.               Jim Carranza MD  Resident  02/06/22 3971

## 2022-02-06 NOTE — ED NOTES
Nurse to Nurse given to [place], [place]. All questions answered at this time.      Chai Huff RN  02/05/22 1276

## 2022-02-07 PROCEDURE — 97161 PT EVAL LOW COMPLEX 20 MIN: CPT | Performed by: PHYSICAL THERAPIST

## 2022-02-07 PROCEDURE — 6370000000 HC RX 637 (ALT 250 FOR IP): Performed by: FAMILY MEDICINE

## 2022-02-07 PROCEDURE — 99232 SBSQ HOSP IP/OBS MODERATE 35: CPT | Performed by: INTERNAL MEDICINE

## 2022-02-07 PROCEDURE — 2060000000 HC ICU INTERMEDIATE R&B

## 2022-02-07 PROCEDURE — 97530 THERAPEUTIC ACTIVITIES: CPT | Performed by: PHYSICAL THERAPIST

## 2022-02-07 PROCEDURE — 97530 THERAPEUTIC ACTIVITIES: CPT | Performed by: OCCUPATIONAL THERAPIST

## 2022-02-07 PROCEDURE — 97165 OT EVAL LOW COMPLEX 30 MIN: CPT | Performed by: OCCUPATIONAL THERAPIST

## 2022-02-07 PROCEDURE — 2580000003 HC RX 258: Performed by: FAMILY MEDICINE

## 2022-02-07 RX ADMIN — Medication 2000 UNITS: at 09:37

## 2022-02-07 RX ADMIN — METOPROLOL SUCCINATE 100 MG: 25 TABLET, FILM COATED, EXTENDED RELEASE ORAL at 09:37

## 2022-02-07 RX ADMIN — LEVETIRACETAM 500 MG: 500 TABLET, FILM COATED ORAL at 09:38

## 2022-02-07 RX ADMIN — Medication 5 ML: at 09:16

## 2022-02-07 RX ADMIN — LEVETIRACETAM 500 MG: 500 TABLET, FILM COATED ORAL at 20:44

## 2022-02-07 RX ADMIN — METHIMAZOLE 7.5 MG: 5 TABLET ORAL at 12:00

## 2022-02-07 RX ADMIN — Medication 10 ML: at 20:44

## 2022-02-07 RX ADMIN — TRAZODONE HYDROCHLORIDE 50 MG: 50 TABLET ORAL at 20:44

## 2022-02-07 RX ADMIN — ALLOPURINOL 300 MG: 100 TABLET ORAL at 09:38

## 2022-02-07 RX ADMIN — PROBENECID AND COLCHICINE 1 TABLET: 500; .5 TABLET ORAL at 20:44

## 2022-02-07 RX ADMIN — LISINOPRIL 20 MG: 10 TABLET ORAL at 09:38

## 2022-02-07 ASSESSMENT — PAIN SCALES - GENERAL
PAINLEVEL_OUTOF10: 4
PAINLEVEL_OUTOF10: 0
PAINLEVEL_OUTOF10: 1

## 2022-02-07 ASSESSMENT — PAIN DESCRIPTION - LOCATION: LOCATION: LEG

## 2022-02-07 ASSESSMENT — PAIN DESCRIPTION - DESCRIPTORS: DESCRIPTORS: CRUSHING;DISCOMFORT

## 2022-02-07 NOTE — PROGRESS NOTES
Assessment started. Pt A/ox 4 c/o leg pain cramps while sitting  And walking. Medicated. Pt self position for comfort. Vitals stable. Reviewed safety call light within reach.

## 2022-02-07 NOTE — PROGRESS NOTES
[address] [facility]  [address], [address]         [id number]                                                   Patient Name: Arpit Melo     MRN: [id number]     : 1945     Room: [address] [place] [address]       Evaluating OT: YESSENIA Sainz/ANA; [id number]       Referring Provider and Orders/Date:   OT eval and treat Start: 22 1630, End: 22 1630, ONE TIME, Standing Count: 1 Occurrences, Anderson Harrison MD     Diagnosis:   1.  Acute deep vein thrombosis (DVT) of femoral vein of left lower extremity [facility])         Pertinent Medical History:        Past Medical History:   Diagnosis Date    Bilateral renal cysts 10/13/2014    CAD (coronary artery disease)     [facility]     Cerebral [facility] occlusion with [facility] infarction [facility]     Chest pain     2016 lexiscan stress    Hemorrhagic stroke ([facility]     History of gout 10/13/2014    History of pulmonary embolism 10/13/2014    Hyperlipidemia     Hypertension     Hyperthyroidism 10/13/2014    NSTEMI (non-ST elevated myocardial infarction) ([facility] 10/13/2014    Obesity due to excess calories     Thyroid cyst 10/13/2014    Thyroid disease           Past Surgical History:   Procedure Laterality Date    BACK SURGERY      CORONARY ANGIOPLASTY WITH STENT PLACEMENT  10-    Dr. Loida Gonzalez 3.0x18 MID LAD    CRANIOTOMY Right 2021    RIGHT FRONTAL REMINGTON HOLE TO DRAIN HEMATOMA AND PLACEMENT OF SUBDURAL DRAIN performed by Tab Barfield MD at [address]  CATH LAB PROCEDURE  10/13/2014    Dr. Severiano Hoe: PCI to LAD       Precautions:  Fall Risk    Recommended placement: home with [facility]    Assessment of current deficits     [x] Functional mobility  [x]ADLs  [x] Strength               []Cognition     [x] Functional transfers   [x] IADLs         [x] Safety Awareness   [x]Endurance     [] Fine Coordination [x] Balance      [] Vision/perception   []Sensation      [x]Gross Motor Coordination  [] ROM  [] Delirium                   [] Motor Control     OT PLAN OF CARE   OT POC based on physician orders, patient diagnosis and results of clinical assessment    Frequency/Duration 1-3 days/wk for 2 weeks PRN   Specific OT Treatment Interventions to include:   * Instruction/training on adapted ADL techniques and AE recommendations to increase functional independence within precautions       * Training on energy conservation strategies, correct breathing pattern and techniques to improve independence/tolerance for self-care routine  * Functional transfer/mobility training/DME recommendations for increased independence, safety, and fall prevention  * Patient/Family education to increase follow through with safety techniques and functional independence  * Recommendation of environmental modifications for increased safety with functional transfers/mobility and ADLs  * Therapeutic exercise to improve motor endurance, ROM, and functional strength for ADLs/functional transfers  * Therapeutic activities to facilitate/challenge dynamic balance, stand tolerance for increased safety and independence with ADLs  * Therapeutic activities to facilitate gross/fine motor skills for increased independence with ADLs  * Positioning to improve skin integrity, interaction with environment and functional independence     Recommended Adaptive Equipment/DME: TBD      Home Living: Pt lives with wife and daughter in a 1story home with 4 steps to enter. Does not have to use basement.      Bathroom setup: walk in shower with built in seat; shower chair, elevated toilet    DME owned: [employer], [employer], cane     Prior Level of Function: indep with ADLs , indep with IADLs; ambulated indep   Driving: yes   Occupation: retired   Enjoys: swimming, coaching high school football    Pain Level: none;   Cognition: A&O: 4/4; Follows 4 step directions   Memory: Intact   Sequencing:  Intact   Problem solving:  Intact   Judgement/safety:  Intact    AM-PAC Daily Activity Inpatient   How much help for putting on and taking off regular lower body clothing?: A Little  How much help for Bathing?: A Little  How much help for Toileting?: A Little  How much help for putting on and taking off regular upper body clothing?: A Little  How much help for taking care of personal grooming?: A Little  How much help for eating meals?: None  AM-PAC Inpatient Daily Activity Raw Score: 19  AM-PAC Inpatient ADL T-Scale Score : 40.22  ADL Inpatient CMS 0-100% Score: 42.8  ADL Inpatient CMS G-Code Modifier : CK     Functional Assessment:     Initial Eval Status  Date: 2/7/2022   Treatment Status  Date: STGs = LTGs  Time frame: 10-14 days   Feeding Independent   NA-PLOF   Grooming Supervision standing at the sink for oral care, hand wash and face wash. Independent    UB Dressing Stand by Assist to don hoodie from sitting EOB. Assist to manage lines  Independent    LB Dressing Stand by Assist to don socks and shoes with cues for energy conservation  Independent    Bathing Stand by Assist for sponge bathing from sitting/standing EOB  Independent    Toileting Stand by Assist with cues for safety with sitting on/off toilet  Independent    Bed Mobility  Supine to sit: Independent   Sit to supine: Independent     Not Appropriate-PLOF     Functional Transfers Stand by Assist without AD from bed and toilet  Independent    Functional Mobility Stand by Assist without AD with fear of L leg giving out on him. Independent    Balance Sitting:     Static: good    Dynamic:fair+  Standing: fair  Sitting:     Dynamic:good  Standing: fair+   Activity Tolerance Vitals with activity: WFL; sitting EOB for 10 period.    Standing 1-2min   Increase standing tolerance for >4min with stable vital signs for carry over into toileting, functional tranfers and indep in ADLs   Visual/  Perceptual Glasses: not Present; WFL    Reports change in vision since admission: No     NA     Hand Dominance  [x] Right  [] Left    AROM (PROM) Strength Additional Info:    RUE  WFL 5/5 good  and  FMC/dexterity noted during ADL tasks  Opposition [x] Intact [] Impaired  Finger to nose [x] Intact [] Impaired     LUE WFL 5/5 good  and FMC/dexterity noted during ADL tasks  Opposition [x] Intact [] Impaired  Finger to nose [x] Intact [] Impaired     Hearing: WFL   Sensation:  No c/o numbness or tingling   Tone: WFL   Edema: none    Comments: Upon arrival patient supine. Pt required SBA for most UB ADLs and SBA LB ADLs tasks. Limited with SB A for standing during LB ADLs and functional transfers. The biggest barriers reflect that of functional transfers, functional mobility, UB/LB ADLs, activity tolerance, balance, safety and strengthening. At end of session, patient suipne with call light and phone within reach, all lines and tubes intact. Overall patient demonstrated decreased independence and safety during completion of ADL/functional transfer/mobility tasks compared to PLOF. Nursing updated on pt position and status following OT eval. Pt would benefit from continued skilled OT to increase safety and independence with completion of ADL/IADL tasks for functional independence and quality of life. Treatment: OT treatment provided this date includes:   Instruction, education and training on safe facilitation and adapted techniques for completion of ADLs. These include neuromuscular reeducation to facilitate balance/righting reactions, safe functional transfer techniques and on energy conservation/work simplification for completion of ADLs. Education provided on hand/feet placement with bed rails and body mechanics for fall prevention. Cues for energy conservation and safety for in the home at NH, including modifications and DME.  Extended time to complete all tasks, including skilled monitoring of patient's response during treatment session and vital signs. Prior to and at the end of session, environmental modifications / line management completed for patients safety and efficiency of treatment session. See above for further details. Rehab Potential: Good for established goals     Patient / Family Goal: Return home    Patient and/or family were instructed on functional diagnosis, prognosis/goals and OT plan of care. Demonstrated good understanding. Eval Complexity: Low  · History: Brief review of medical records and additional review of physical, cognitive, or psychosocial history related to current functional performance  · Exam: 3+ performance deficits  · Assistance/Modification: Min assistance or modifications required to perform tasks. May have comorbidities that affect occupational performance. Time In: 1500  Time Out: 1530  Total Treatment Time: 10    Min Units   OT Eval Low 97165  x  1   OT Eval Medium 52070      OT Eval High 09004      OT Re-Eval [id number]       Therapeutic Ex 13832       Therapeutic Activities 98377  10 1    ADL/Self Care 00324       Orthotic Management 65535       Manual 13180     Neuro Re-Ed 01929       Non-Billable Time          Evaluation Time additionally includes thorough review of current medical information, gathering information on past medical history/social history and prior level of function, interpretation of standardized testing/informal observation of tasks, assessment of data and development of plan of care and goals.             Wilman Britt OTR/L; [id number]

## 2022-02-07 NOTE — PROGRESS NOTES
Department of Internal Medicine  General Internal Medicine  Attending Progress Note  Chief Complaint   Patient presents with    Leg Pain     left lower leg pain x 1 day, concerned about a clot     SUBJECTIVE:    Denied fever and chills. No chest pain. Aware of plans to evaluate for possible anticoagulation clearance from Neurosurg before discharge.      OBJECTIVE      Medications    Current Facility-Administered Medications: sodium chloride flush 0.9 % injection 5-40 mL, 5-40 mL, IntraVENous, 2 times per day  sodium chloride flush 0.9 % injection 5-40 mL, 5-40 mL, IntraVENous, PRN  0.9 % sodium chloride infusion, 25 mL, IntraVENous, PRN  ondansetron (ZOFRAN-ODT) disintegrating tablet 4 mg, 4 mg, Oral, Q8H PRN **OR** ondansetron (ZOFRAN) injection 4 mg, 4 mg, IntraVENous, Q6H PRN  acetaminophen (TYLENOL) tablet 650 mg, 650 mg, Oral, Q6H PRN **OR** acetaminophen (TYLENOL) suppository 650 mg, 650 mg, Rectal, Q6H PRN  senna (SENOKOT) tablet 8.6 mg, 1 tablet, Oral, Daily PRN  allopurinol (ZYLOPRIM) tablet 300 mg, 300 mg, Oral, Daily  colchicine-probenecid 0.5-500 MG per tablet 1 tablet, 1 tablet, Oral, Daily  levETIRAcetam (KEPPRA) tablet 500 mg, 500 mg, Oral, BID  lisinopril (PRINIVIL;ZESTRIL) tablet 20 mg, 20 mg, Oral, Daily  methIMAzole (TAPAZOLE) tablet 7.5 mg, 7.5 mg, Oral, Daily  metoprolol succinate (TOPROL XL) extended release tablet 100 mg, 100 mg, Oral, Daily  vitamin D (CHOLECALCIFEROL) tablet 2,000 Units, 2,000 Units, Oral, Daily  traZODone (DESYREL) tablet 50 mg, 50 mg, Oral, Nightly PRN  melatonin disintegrating tablet 10 mg, 10 mg, Oral, Nightly  Physical    VITALS:  /80   Pulse 77   Temp 97.6 °F (36.4 °C) (Oral)   Resp 14   Ht 6' (1.829 m)   Wt 230 lb (104.3 kg)   SpO2 94%   BMI 31.19 kg/m²   CONSTITUTIONAL:  awake and cooperative  EYES:  extra-ocular muscles intact and vision intact  ENT:  normocepalic, without obvious abnormality  NECK:  supple, symmetrical, trachea midline  LUNGS:  no increased work of breathing and no crackles or wheezing  CARDIOVASCULAR:  normal apical pulses and normal S1 and S2  ABDOMEN:  normal bowel sounds, non-distended and non-tender  MUSCULOSKELETAL:  Positive left lower extremity edema  NEUROLOGIC:  Mental Status Exam:  Level of Alertness:   awake  Orientation:   person, place, time  SKIN:  no bruising or bleeding  Data    CBC:   Lab Results   Component Value Date    WBC 7.8 02/05/2022    RBC 5.61 02/05/2022    HGB 17.8 02/05/2022    HCT 53.4 02/05/2022    MCV 95.2 02/05/2022    MCH 31.7 02/05/2022    MCHC 33.3 02/05/2022    RDW 13.5 02/05/2022    PLT 98 02/05/2022    MPV 11.3 02/05/2022     BMP:    Lab Results   Component Value Date     02/06/2022    K 4.3 02/06/2022     02/06/2022    CO2 24 02/06/2022    BUN 26 02/06/2022    LABALBU 3.8 02/05/2022    LABALBU 4.1 02/10/2012    CREATININE 1.4 02/06/2022    CALCIUM 9.5 02/06/2022    GFRAA 60 02/06/2022    LABGLOM 49 02/06/2022    GLUCOSE 117 02/06/2022    GLUCOSE 92 02/10/2012       ASSESSMENT AND PLAN      1. Acute deep vein thrombosis (DVT) of femoral vein of left lower extremity (HCC)    2. CKD (chronic kidney disease) stage 3, GFR 30-59 ml/min    3. Hyperthyroidism    4. Coronary artery disease involving native coronary artery of native heart    5. Vitamin D deficiency    6. Class 1 obesity due to excess calories without serious comorbidity with body mass index (BMI) of 31.0 to 31.9 in adult    7. Primary hypertension:    Plans:  Await further Neurosurgery recommendation for anticoagulation  Continue to monitor  Repeat CT head showed Chronic/Subacute Hemorrhage.   S/P IVC filter placement

## 2022-02-07 NOTE — PROGRESS NOTES
SUBJECTIVE:      Patient was seen and evaluated. No new complaints.       Have not received call back from Neurosurgeon (called yesterday)

## 2022-02-07 NOTE — PROGRESS NOTES
Physical Therapy Initial Evaluation/Plan of Care    Room #:  0085/3141-92  Patient Name: Michael Vyas  YOB: 1945  MRN: [id number]    Date of Service: 2/7/2022     Tentative placement recommendation: Subacute vs Home Health Physical Therapy if patient meets goals  Equipment recommendation: None      Evaluating Physical Therapist: Beatrice Strickland PT, DPT #[id number]      Specific Provider Orders/Date/Referring Provider :     New PT Orders 02/06/22 1617 Telephone with Hubert Rowland MD 02/06/22 1095       Admitting Diagnosis:   Acute deep vein thrombosis (DVT) of femoral vein of left lower extremity ([address] [I82.412]      Surgery: none      Patient Active Problem List   Diagnosis    CKD (chronic kidney disease) stage 3, GFR 30-59 ml/min    NSTEMI (non-ST elevated myocardial infarction) ([address]    Hyperthyroidism    History of pulmonary embolism    History of gout    Thyroid cyst    Bilateral renal cysts    Coronary artery disease involving native coronary artery of native heart    Fall at home, sequela    SDH (subdural hematoma) ([address]    Vitamin D deficiency    Toxic multinodul goiter    Acute hypoxemic respiratory failure due to COVID-19 ([address]    Class 1 obesity due to excess calories without serious comorbidity with body mass index (BMI) of 31.0 to 31.9 in adult    Acute deep vein thrombosis (DVT) of femoral vein of left lower extremity ([address]    Primary hypertension        ASSESSMENT of Current Deficits Patient exhibits decreased strength, balance and endurance impairing functional mobility, transfers, gait , gait distance, tolerance to activity and participation. Pt mildly unsteady with [employer] with Marcus for function and stated that B legs cramped up on him limiting tolerance and endurance for function. Pt did not have any LOB, dizziness, or SOB. Pt agreeable to seated exercises with VC required for technique.         PHYSICAL THERAPY  PLAN OF CARE       Physical therapy plan of care is established based on physician order,  patient diagnosis and clinical assessment    Current Treatment Recommendations:    -Bed Mobility: Lower extremity exercises  and Trunk control activities   -Sitting Balance: Incorporate reaching activities to activate trunk muscles , Hands on support to maintain midline , Facilitate active trunk muscle engagement , Facilitate postural control in all planes  and Engage in core activities to allow for movement within base of support   -Standing Balance: Perform strengthening exercises in standing to promote motor control with or without upper extremity support , Instruct patient on adequate base of support to maintain balance and Challenge balance utilizing reaching  activities beyond center of gravity    -Transfers: Provide instruction on proper hand and foot position for adequate transfer of weight onto lower extremities and use of gait device if needed, Cues for hand placement, technique and safety.  Provide stabilization to prevent fall , Facilitate weight shift forward on to lower extremities and provide necessary stabilization of bilateral lower extremities , Support transfer of weight on to lower extremities and Assist with extension of knees trunk and hip to accept weight transfer   -Gait: Gait training, Standing activities to improve: base of support, weight shift, weight bearing , Exercises to improve trunk control, Exercises to improve hip and knee control, Performance of protected weight bearing activities and Activities to increase weight bearing   -Endurance: Utilize Supervised activities to increase level of endurance to allow for safe functional mobility including transfers and gait  and Use graduated activities to promote good breathing techniques and provide support and education to maximize respiratory function  -Stairs: Stair training with instruction on proper technique and hand placement on rail    PT long term treatment goals are located in below grid    Patient and or family understand(s) diagnosis, prognosis, and plan of care. Frequency of treatments: Patient will be seen  daily. Prior Level of Function: Patient ambulated independently   Rehab Potential: good  - for baseline    Past medical history:   Past Medical History:   Diagnosis Date    Bilateral renal cysts 10/13/2014    CAD (coronary artery disease)     Cardiomyopathy [facility]     Cerebral artery occlusion with cerebral infarction ([facility]     Chest pain     8-4-2016 lexiscan stress    Hemorrhagic stroke ([facility]     History of gout 10/13/2014    History of pulmonary embolism 10/13/2014    Hyperlipidemia     Hypertension     Hyperthyroidism 10/13/2014    NSTEMI (non-ST elevated myocardial infarction) ([facility] 10/13/2014    Obesity due to excess calories     Thyroid cyst 10/13/2014    Thyroid disease      Past Surgical History:   Procedure Laterality Date    BACK SURGERY      CORONARY ANGIOPLASTY WITH STENT PLACEMENT  10-    Dr. Lesli Ulrich 3.0x18 MID LAD    CRANIOTOMY Right 6/25/2021    RIGHT FRONTAL REMINGTON HOLE TO DRAIN HEMATOMA AND PLACEMENT OF SUBDURAL DRAIN performed by Larisa Mccormick MD at [facility] CATH LAB PROCEDURE  10/13/2014    Dr. Willis Clarity: PCI to LAD    IR IVC FILTER PLACEMENT W IMAGING  2/6/2022    IR IVC FILTER PLACEMENT W IMAGING 2/6/2022 [facility]       SUBJECTIVE:    Precautions:  Up with assistance, falls   Social history: Patient lives with spouse and daughter in a ranch home  with 4 steps  to enter bilateral Rail  Walk in shower grab bars    Equipment owned: Hermilo Dean, Bedside commode, Shower chair and Elevated toilet seat,      [address]   17/24    AM-PAC Mobility Inpatient   How much difficulty turning over in bed?: A Little  How much difficulty sitting down on / standing up from a chair with arms?: A Little  How much difficulty moving from lying on back to sitting on side of bed?: A Little  How much help from another person moving to and from a bed to a chair?: A Little  How much help from another person needed to walk in hospital room?: A Little  How much help from another person for climbing 3-5 steps with a railing?: A Lot  AM-PAC Inpatient Mobility Raw Score : 17  AM-PAC Inpatient T-Scale Score : 42.13  Mobility Inpatient CMS 0-100% Score: 50.57  Mobility Inpatient CMS G-Code Modifier : CK    Nursing cleared patient for PT evaluation. The admitting diagnosis and active problem list as listed above have been reviewed prior to the initiation of this evaluation. OBJECTIVE;   Initial Evaluation  Date: 2/7/2022 Treatment Date:     Short Term/ Long Term   Goals   Was pt agreeable to Eval/treatment? Yes  To be met in 3 days   Pain level   0/10       Bed Mobility    Rolling: Supervision     Supine to sit: Supervision     Sit to supine: Supervision     Scooting: Supervision     Rolling: Independent    Supine to sit:  Independent    Sit to supine: Independent    Scooting: Independent     Transfers Sit to stand: Minimal assist of 1   Sit to stand: Independent    Ambulation    2 x 75 feet using  wheeled walker with Minimal assist of 1   for walker control, walker approximation, balance and safety   > 200 feet using  least restrictive device versus no device with Independent    Stair negotiation: ascended and descended   Not assessed     4 steps with 2 HR with Mod I   ROM Within functional limits    Increase range of motion 10% of affected joints    Strength BUE:  refer to OT eval  RLE:  4/5  LLE:  4/5  Increase strength in affected mm groups by 1/3 grade   Balance Sitting EOB:  good -  Dynamic Standing:  fair   Sitting EOB:  good   Dynamic Standing: fair +     Patient is Alert & Oriented x person, place, time and situation and follows directions    Sensation:  Patient  denies numbness/tingling   Edema:  no   Endurance: fair     Vitals: room air   Blood Pressure at rest  Blood Pressure during session Heart Rate at rest  Heart Rate during session    SPO2 at rest %  SPO2 during session %     Patient education  Patient educated on role of Physical Therapy, risks of immobility, safety and plan of care, energy conservation,  importance of mobility while in hospital , ankle pumps, quad set and glut set for edema control, blood clot prevention, importance and purpose of adaptive device and adjusted to proper height for the patient. and safety      Patient response to education:   Pt verbalized understanding Pt demonstrated skill Pt requires further education in this area   Yes Partial Yes      Treatment:  Patient practiced and was instructed/facilitated in the following treatment: Patient Sat edge of bed 15 minutes with Supervision  to increase dynamic sitting balance and activity tolerance. Pt performed bed mobility, transfers ambulation, seated exercises     Therapeutic Exercises:  ankle pumps, heel raises, long arc quad and seated marching  x 10 reps x 2 sets       At end of session, patient in chair with   call light and phone within reach,  all lines and tubes intact, nursing notified. Patient would benefit from continued skilled Physical Therapy to improve functional independence and quality of life. Patient's/ family goals   get stronger    Time in  1126  Time out  1200    Total Treatment Time  14 minutes    Evaluation time includes thorough review of current medical information, gathering information on past medical history/social history and prior level of function, completion of standardized testing/informal observation of tasks, assessment of data, and development of Plan of care and goals.      CPT codes:  Low Complexity PT evaluation (42465)  Therapeutic activities (07516)   14 minutes  1 unit(s)    Dann Aldana PT

## 2022-02-07 NOTE — CARE COORDINATION
2/7/22 1642 CM note: NO COVID TESTING THIS ADMIT, previously covid (+) 9/6/21. [place]. Pt resides with his wife and daughter in a 1 floor home, independent, drives, and has DME (WW, BSC, cane, shower chair) but he does not use it. Hx [facility]; no hx SNF. Discharge plan is home and pt declines need for HHC. Pts family will provide transportation home.  Electronically signed by Ishmael Bolden RN on 2/7/2022 at 4:45 PM

## 2022-02-08 LAB
ALBUMIN SERPL-MCNC: 3.6 G/DL (ref 3.5–5.2)
ALP BLD-CCNC: 91 U/L (ref 40–129)
ALT SERPL-CCNC: 17 U/L (ref 0–40)
ANION GAP SERPL CALCULATED.3IONS-SCNC: 11 MMOL/L (ref 7–16)
APTT: 31.5 SEC (ref 24.5–35.1)
AST SERPL-CCNC: 25 U/L (ref 0–39)
ATYPICAL LYMPHOCYTE RELATIVE PERCENT: 1.8 % (ref 0–4)
BASOPHILS ABSOLUTE: 0 E9/L (ref 0–0.2)
BASOPHILS RELATIVE PERCENT: 0.3 % (ref 0–2)
BILIRUB SERPL-MCNC: 1.1 MG/DL (ref 0–1.2)
BUN BLDV-MCNC: 42 MG/DL (ref 6–23)
CALCIUM SERPL-MCNC: 9.5 MG/DL (ref 8.6–10.2)
CHLORIDE BLD-SCNC: 99 MMOL/L (ref 98–107)
CO2: 24 MMOL/L (ref 22–29)
CREAT SERPL-MCNC: 1.9 MG/DL (ref 0.7–1.2)
EOSINOPHILS ABSOLUTE: 0.25 E9/L (ref 0.05–0.5)
EOSINOPHILS RELATIVE PERCENT: 2.7 % (ref 0–6)
GFR AFRICAN AMERICAN: 42
GFR NON-AFRICAN AMERICAN: 35 ML/MIN/1.73
GLUCOSE BLD-MCNC: 104 MG/DL (ref 74–99)
HCT VFR BLD CALC: 52 % (ref 37–54)
HCT VFR BLD CALC: 52.5 % (ref 37–54)
HEMOGLOBIN: 16.8 G/DL (ref 12.5–16.5)
HEMOGLOBIN: 17.3 G/DL (ref 12.5–16.5)
LYMPHOCYTES ABSOLUTE: 0.46 E9/L (ref 1.5–4)
LYMPHOCYTES RELATIVE PERCENT: 3.6 % (ref 20–42)
MCH RBC QN AUTO: 31.1 PG (ref 26–35)
MCH RBC QN AUTO: 32.3 PG (ref 26–35)
MCHC RBC AUTO-ENTMCNC: 32.3 % (ref 32–34.5)
MCHC RBC AUTO-ENTMCNC: 33 % (ref 32–34.5)
MCV RBC AUTO: 96.3 FL (ref 80–99.9)
MCV RBC AUTO: 97.9 FL (ref 80–99.9)
MONOCYTES ABSOLUTE: 0.82 E9/L (ref 0.1–0.95)
MONOCYTES RELATIVE PERCENT: 8.9 % (ref 2–12)
NEUTROPHILS ABSOLUTE: 7.55 E9/L (ref 1.8–7.3)
NEUTROPHILS RELATIVE PERCENT: 83 % (ref 43–80)
PDW BLD-RTO: 14 FL (ref 11.5–15)
PDW BLD-RTO: 14.1 FL (ref 11.5–15)
PLATELET # BLD: 96 E9/L (ref 130–450)
PLATELET # BLD: 98 E9/L (ref 130–450)
PLATELET CONFIRMATION: NORMAL
PLATELET CONFIRMATION: NORMAL
PMV BLD AUTO: 11.3 FL (ref 7–12)
PMV BLD AUTO: 11.5 FL (ref 7–12)
POLYCHROMASIA: ABNORMAL
POTASSIUM SERPL-SCNC: 4.7 MMOL/L (ref 3.5–5)
RBC # BLD: 5.36 E12/L (ref 3.8–5.8)
RBC # BLD: 5.4 E12/L (ref 3.8–5.8)
SODIUM BLD-SCNC: 134 MMOL/L (ref 132–146)
TOTAL PROTEIN: 6.4 G/DL (ref 6.4–8.3)
WBC # BLD: 8.6 E9/L (ref 4.5–11.5)
WBC # BLD: 9.1 E9/L (ref 4.5–11.5)

## 2022-02-08 PROCEDURE — 6370000000 HC RX 637 (ALT 250 FOR IP): Performed by: FAMILY MEDICINE

## 2022-02-08 PROCEDURE — 2580000003 HC RX 258: Performed by: FAMILY MEDICINE

## 2022-02-08 PROCEDURE — 6360000002 HC RX W HCPCS: Performed by: INTERNAL MEDICINE

## 2022-02-08 PROCEDURE — 80053 COMPREHEN METABOLIC PANEL: CPT

## 2022-02-08 PROCEDURE — 85027 COMPLETE CBC AUTOMATED: CPT

## 2022-02-08 PROCEDURE — 6370000000 HC RX 637 (ALT 250 FOR IP): Performed by: INTERNAL MEDICINE

## 2022-02-08 PROCEDURE — 99239 HOSP IP/OBS DSCHRG MGMT >30: CPT | Performed by: INTERNAL MEDICINE

## 2022-02-08 PROCEDURE — 85730 THROMBOPLASTIN TIME PARTIAL: CPT

## 2022-02-08 PROCEDURE — 36415 COLL VENOUS BLD VENIPUNCTURE: CPT

## 2022-02-08 PROCEDURE — 2060000000 HC ICU INTERMEDIATE R&B

## 2022-02-08 PROCEDURE — 85025 COMPLETE CBC W/AUTO DIFF WBC: CPT

## 2022-02-08 RX ORDER — HEPARIN SODIUM 1000 [USP'U]/ML
80 INJECTION, SOLUTION INTRAVENOUS; SUBCUTANEOUS PRN
Status: DISCONTINUED | OUTPATIENT
Start: 2022-02-08 | End: 2022-02-12 | Stop reason: HOSPADM

## 2022-02-08 RX ORDER — HEPARIN SODIUM 1000 [USP'U]/ML
40 INJECTION, SOLUTION INTRAVENOUS; SUBCUTANEOUS PRN
Status: DISCONTINUED | OUTPATIENT
Start: 2022-02-08 | End: 2022-02-12 | Stop reason: HOSPADM

## 2022-02-08 RX ORDER — WARFARIN SODIUM 2.5 MG/1
TABLET ORAL
Status: DISPENSED
Start: 2022-02-08 | End: 2022-02-09

## 2022-02-08 RX ORDER — WARFARIN SODIUM 7.5 MG/1
7.5 TABLET ORAL
Status: COMPLETED | OUTPATIENT
Start: 2022-02-08 | End: 2022-02-08

## 2022-02-08 RX ORDER — WARFARIN SODIUM 5 MG/1
5 TABLET ORAL DAILY
Qty: 30 TABLET | Refills: 3 | Status: SHIPPED | OUTPATIENT
Start: 2022-02-08 | End: 2022-02-12 | Stop reason: SDUPTHER

## 2022-02-08 RX ORDER — WARFARIN SODIUM 5 MG/1
TABLET ORAL
Status: DISPENSED
Start: 2022-02-08 | End: 2022-02-09

## 2022-02-08 RX ORDER — HEPARIN SODIUM 1000 [USP'U]/ML
80 INJECTION, SOLUTION INTRAVENOUS; SUBCUTANEOUS ONCE
Status: COMPLETED | OUTPATIENT
Start: 2022-02-08 | End: 2022-02-08

## 2022-02-08 RX ORDER — HEPARIN SODIUM 10000 [USP'U]/100ML
5-30 INJECTION, SOLUTION INTRAVENOUS CONTINUOUS
Status: DISCONTINUED | OUTPATIENT
Start: 2022-02-08 | End: 2022-02-12

## 2022-02-08 RX ADMIN — Medication 5 ML: at 10:11

## 2022-02-08 RX ADMIN — ALLOPURINOL 300 MG: 100 TABLET ORAL at 09:40

## 2022-02-08 RX ADMIN — LEVETIRACETAM 500 MG: 500 TABLET, FILM COATED ORAL at 09:40

## 2022-02-08 RX ADMIN — Medication 10 ML: at 22:43

## 2022-02-08 RX ADMIN — LEVETIRACETAM 500 MG: 500 TABLET, FILM COATED ORAL at 21:45

## 2022-02-08 RX ADMIN — HEPARIN SODIUM 8340 UNITS: 1000 INJECTION INTRAVENOUS; SUBCUTANEOUS at 22:21

## 2022-02-08 RX ADMIN — WARFARIN SODIUM 7.5 MG: 7.5 TABLET ORAL at 18:34

## 2022-02-08 RX ADMIN — HEPARIN SODIUM 1878 UNITS/HR: 10000 INJECTION, SOLUTION INTRAVENOUS at 22:31

## 2022-02-08 RX ADMIN — METHIMAZOLE 7.5 MG: 5 TABLET ORAL at 12:22

## 2022-02-08 RX ADMIN — Medication 2000 UNITS: at 09:39

## 2022-02-08 RX ADMIN — Medication 10 MG: at 21:45

## 2022-02-08 RX ADMIN — METOPROLOL SUCCINATE 100 MG: 25 TABLET, FILM COATED, EXTENDED RELEASE ORAL at 09:39

## 2022-02-08 RX ADMIN — PROBENECID AND COLCHICINE 1 TABLET: 500; .5 TABLET ORAL at 21:44

## 2022-02-08 RX ADMIN — TRAZODONE HYDROCHLORIDE 50 MG: 50 TABLET ORAL at 22:21

## 2022-02-08 RX ADMIN — LISINOPRIL 20 MG: 10 TABLET ORAL at 09:39

## 2022-02-08 ASSESSMENT — PAIN SCALES - GENERAL
PAINLEVEL_OUTOF10: 0

## 2022-02-08 NOTE — PROGRESS NOTES
Reviewed CT scan of brain:  No evidence for subarachnoid hemorrhage.       Right frontal ellie hole with stable thin right frontal parietal   chronic/subacute subdural hematoma unchanged compared to 09/30/2021.       Stable left anterior temporal arachnoid cyst.     The patient is known to Dr Mikael Giraldo

## 2022-02-08 NOTE — PROGRESS NOTES
Reviewed discharge instructions with pt and gave his a copy of his med list and answered his questions. Still awaiting for pharmacy to send up discharge meds.

## 2022-02-08 NOTE — PROGRESS NOTES
drainage or sinus tenderness. Throat: lips, mucosa, and tongue normal; teeth and gums normal  Lungs: clear to auscultation bilaterally  Chest wall: no tenderness  Heart: regular rate and rhythm, S1, S2 normal, no murmur, click, rub or gallop  Abdomen: soft, non-tender; bowel sounds normal; no masses,  no organomegaly  Extremities: extremities normal, atraumatic, no cyanosis or edema  Pulses: 2+ and symmetric    Disposition: home    Patient Instructions:      Medication List      START taking these medications    * apixaban 5 MG Tabs tablet  Commonly known as: ELIQUIS  Take 2 tablets by mouth 2 times daily for 14 doses     * apixaban 5 MG Tabs tablet  Commonly known as: ELIQUIS  Take 1 tablet by mouth 2 times daily  Start taking on: February 15, 2022         * This list has 2 medication(s) that are the same as other medications prescribed for you. Read the directions carefully, and ask your doctor or other care provider to review them with you. CONTINUE taking these medications    allopurinol 300 MG tablet  Commonly known as: ZYLOPRIM     ascorbic acid 500 MG tablet  Commonly known as: VITAMIN C  Take 1 tablet by mouth 2 times daily     colchicine-probenecid 0.5-500 MG per tablet     furosemide 20 MG tablet  Commonly known as: LASIX     levETIRAcetam 500 MG tablet  Commonly known as: Keppra  Take 1 tablet by mouth 2 times daily     lisinopril 20 MG tablet  Commonly known as: PRINIVIL;ZESTRIL     methIMAzole 5 MG tablet  Commonly known as: TAPAZOLE  Take 1.5 tablets by mouth daily 1.5 tabs     metoprolol succinate 100 MG extended release tablet  Commonly known as: TOPROL XL  Take 1 tablet by mouth daily     nitroGLYCERIN 0.4 MG SL tablet  Commonly known as: NITROSTAT  Place 1 tablet under the tongue every 5 minutes as needed for Chest pain.      pyridoxine 50 MG tablet  Commonly known as: B-6  Take 1 tablet by mouth daily     vitamin D 50 MCG (2000 UT) Tabs tablet  Commonly known as: CHOLECALCIFEROL  Take 1 tablet by mouth daily     zinc sulfate 220 (50 Zn) MG capsule  Commonly known as: ZINCATE  Take 1 capsule by mouth daily        STOP taking these medications    hydroCHLOROthiazide 12.5 MG capsule  Commonly known as: Nila Barnett           Where to Get Your Medications      These medications were sent to [address], [address] [address] [place] [address]    Phone: [phone]   · apixaban 5 MG Tabs tablet  · apixaban 5 MG Tabs tablet         Activity: activity as tolerated  Diet: regular diet  Wound Care: keep wound clean and dry    Follow-up with PCP in 5 weeks. Signed:  Lety Coleman MD  2/8/2022  3:34 PM       ADDENDUM:  Discharged patient, however, patient is not able to afford his eliquis. I switched this to Lovenox, but this is still very expensive for patient. Held discharge, start on heparin drip and will discharge when INR becomes therapeutic.

## 2022-02-08 NOTE — PROGRESS NOTES
Assessment started. Pt A/OX3 c/o of general aches. Vitals stable. Reviewed safety call light within reach.

## 2022-02-08 NOTE — PROGRESS NOTES
SUBJECTIVE:      Patient was seen and evaluated. No new complaints. Still waiting for Neurosurgery opinion/recommendation regarding the use of anticoagulation. OBJECTIVE:     Physical    VITALS:  /80   Pulse 77   Temp 97.6 °F (36.4 °C) (Oral)   Resp 14   Ht 6' (1.829 m)   Wt 230 lb (104.3 kg)   SpO2 94%   BMI 31.19 kg/m²     CONSTITUTIONAL:   Up in chair, awake and cooperative, NAD  EYES:  extra-ocular muscles intact and vision intact  ENT:  normocepalic, without obvious abnormality  NECK:  supple, symmetrical, trachea midline  LUNGS:  no increased work of breathing and no crackles or wheezing  CARDIOVASCULAR:  normal apical pulses and normal S1 and S2  ABDOMEN:  Protuberant, soft and benign, normal bowel sounds, non-distended and non-tender  MUSCULOSKELETAL:  left lower extremity edema has subsided. Mild tenderness on upper left calf.    NEUROLOGIC:  Mental Status Exam:  Level of Alertness:   awake  Orientation:   person, place, time  SKIN:  no bruising or bleeding

## 2022-02-08 NOTE — CARE COORDINATION
2/8/22 1541 CM note: NO COVID TESTING THIS ADMIT, previously covid (+) 9/6/21. [place]. Discharge order noted. Discharge plan is home and patient declines [facility]. New eliquis this admit. Script sent to [facility]; however when [facility] ran the free 30 day coupon it shows in their system that patient has previously used the free 30 day coupon. Pt verifies he has been on eliquis in the past. Glenna had to forward eliquis script to pts outpatient pharmacy, [facility] in [place], because is showing his pharmacy as not in network. Spoke with tech at [facility] and script has not been forwarded to them yet for them to check copay- CM will f/u w/ [facility] [address] shortly. Electronically signed by Dulce Dykes RN on 2/8/2022 at 3:54 PM     Update: 2/8/22 1715 Per [facility], pts copay for 1 month eliquis will be $267.37 which all goes to his $500 deductible. Once he reaches his $500 deductible his co-pay will be much less but Chino Segura states he can't see what that will actually be until patient meets his $500 deductible. Explained above to patient and patient states he can not afford to pay one lump sum of $268.37 for eliquis this month. Updated charge nurse Cole Alpers and she will check with doctor on alternative.  Electronically signed by Dulec Dykes RN on 2/8/2022 at 5:21 PM

## 2022-02-08 NOTE — PROGRESS NOTES
Awaiting ATPP before starting heparin drip spoke with cyndi Doyle regarding concerns on heparin drip

## 2022-02-08 NOTE — PROGRESS NOTES
Charge nurse spoke with pt neuro  On phone, he stated that he could call back with issue of anitcoug.  On discharge

## 2022-02-08 NOTE — PROGRESS NOTES
Aware of new orders for heparin gtt., pt was notified by charge nurse, that he would not be going home.  Plan to call pharmacy

## 2022-02-09 LAB
APTT: 103.8 SEC (ref 24.5–35.1)
APTT: 103.9 SEC (ref 24.5–35.1)
APTT: >240 SEC (ref 24.5–35.1)
INR BLD: 1.3
PROTHROMBIN TIME: 15.5 SEC (ref 9.3–12.4)

## 2022-02-09 PROCEDURE — 2580000003 HC RX 258: Performed by: INTERNAL MEDICINE

## 2022-02-09 PROCEDURE — 6370000000 HC RX 637 (ALT 250 FOR IP): Performed by: INTERNAL MEDICINE

## 2022-02-09 PROCEDURE — 2060000000 HC ICU INTERMEDIATE R&B

## 2022-02-09 PROCEDURE — 85610 PROTHROMBIN TIME: CPT

## 2022-02-09 PROCEDURE — 36415 COLL VENOUS BLD VENIPUNCTURE: CPT

## 2022-02-09 PROCEDURE — 2580000003 HC RX 258: Performed by: FAMILY MEDICINE

## 2022-02-09 PROCEDURE — 97530 THERAPEUTIC ACTIVITIES: CPT

## 2022-02-09 PROCEDURE — 85730 THROMBOPLASTIN TIME PARTIAL: CPT

## 2022-02-09 PROCEDURE — 6360000002 HC RX W HCPCS: Performed by: INTERNAL MEDICINE

## 2022-02-09 PROCEDURE — 6370000000 HC RX 637 (ALT 250 FOR IP): Performed by: FAMILY MEDICINE

## 2022-02-09 PROCEDURE — 99233 SBSQ HOSP IP/OBS HIGH 50: CPT | Performed by: INTERNAL MEDICINE

## 2022-02-09 RX ORDER — WARFARIN SODIUM 7.5 MG/1
7.5 TABLET ORAL
Status: COMPLETED | OUTPATIENT
Start: 2022-02-09 | End: 2022-02-09

## 2022-02-09 RX ORDER — 0.9 % SODIUM CHLORIDE 0.9 %
500 INTRAVENOUS SOLUTION INTRAVENOUS ONCE
Status: COMPLETED | OUTPATIENT
Start: 2022-02-09 | End: 2022-02-09

## 2022-02-09 RX ADMIN — SODIUM CHLORIDE 500 ML: 9 INJECTION, SOLUTION INTRAVENOUS at 10:08

## 2022-02-09 RX ADMIN — LISINOPRIL 20 MG: 10 TABLET ORAL at 09:08

## 2022-02-09 RX ADMIN — ALLOPURINOL 300 MG: 100 TABLET ORAL at 09:08

## 2022-02-09 RX ADMIN — TRAZODONE HYDROCHLORIDE 50 MG: 50 TABLET ORAL at 21:51

## 2022-02-09 RX ADMIN — WARFARIN SODIUM 7.5 MG: 7.5 TABLET ORAL at 17:58

## 2022-02-09 RX ADMIN — LEVETIRACETAM 500 MG: 500 TABLET, FILM COATED ORAL at 09:08

## 2022-02-09 RX ADMIN — METHIMAZOLE 7.5 MG: 5 TABLET ORAL at 12:16

## 2022-02-09 RX ADMIN — PROBENECID AND COLCHICINE 1 TABLET: 500; .5 TABLET ORAL at 21:50

## 2022-02-09 RX ADMIN — METOPROLOL SUCCINATE 100 MG: 25 TABLET, FILM COATED, EXTENDED RELEASE ORAL at 09:08

## 2022-02-09 RX ADMIN — Medication 10 ML: at 21:52

## 2022-02-09 RX ADMIN — LEVETIRACETAM 500 MG: 500 TABLET, FILM COATED ORAL at 21:50

## 2022-02-09 RX ADMIN — HEPARIN SODIUM 15.15 UNITS/KG/HR: 10000 INJECTION, SOLUTION INTRAVENOUS at 14:22

## 2022-02-09 RX ADMIN — Medication 2000 UNITS: at 09:08

## 2022-02-09 ASSESSMENT — PAIN SCALES - GENERAL
PAINLEVEL_OUTOF10: 0
PAINLEVEL_OUTOF10: 0

## 2022-02-09 ASSESSMENT — PAIN DESCRIPTION - LOCATION: LOCATION: LEG

## 2022-02-09 ASSESSMENT — PAIN DESCRIPTION - PAIN TYPE: TYPE: ACUTE PAIN

## 2022-02-09 NOTE — PROGRESS NOTES
Department of Internal Medicine  General Internal Medicine  Attending Progress Note  Chief Complaint   Patient presents with    Leg Pain     left lower leg pain x 1 day, concerned about a clot     SUBJECTIVE:    Reports that he is doing well. No fever or chills. No diarrhea. Aware of plans to continue coumadin until it becomes therapeutic.      OBJECTIVE      Medications    Current Facility-Administered Medications: 0.9 % sodium chloride bolus, 500 mL, IntraVENous, Once  warfarin (COUMADIN) tablet 7.5 mg, 7.5 mg, Oral, Once  warfarin placeholder: dosing by provider, , Other, RX Placeholder  heparin (porcine) injection 8,340 Units, 80 Units/kg, IntraVENous, PRN  heparin (porcine) injection 4,170 Units, 40 Units/kg, IntraVENous, PRN  heparin 25,000 units in dextrose 5% 250 mL (premix) infusion, 5-30 Units/kg/hr, IntraVENous, Continuous  sodium chloride flush 0.9 % injection 5-40 mL, 5-40 mL, IntraVENous, 2 times per day  sodium chloride flush 0.9 % injection 5-40 mL, 5-40 mL, IntraVENous, PRN  0.9 % sodium chloride infusion, 25 mL, IntraVENous, PRN  ondansetron (ZOFRAN-ODT) disintegrating tablet 4 mg, 4 mg, Oral, Q8H PRN **OR** ondansetron (ZOFRAN) injection 4 mg, 4 mg, IntraVENous, Q6H PRN  acetaminophen (TYLENOL) tablet 650 mg, 650 mg, Oral, Q6H PRN **OR** acetaminophen (TYLENOL) suppository 650 mg, 650 mg, Rectal, Q6H PRN  senna (SENOKOT) tablet 8.6 mg, 1 tablet, Oral, Daily PRN  allopurinol (ZYLOPRIM) tablet 300 mg, 300 mg, Oral, Daily  colchicine-probenecid 0.5-500 MG per tablet 1 tablet, 1 tablet, Oral, Daily  levETIRAcetam (KEPPRA) tablet 500 mg, 500 mg, Oral, BID  lisinopril (PRINIVIL;ZESTRIL) tablet 20 mg, 20 mg, Oral, Daily  methIMAzole (TAPAZOLE) tablet 7.5 mg, 7.5 mg, Oral, Daily  metoprolol succinate (TOPROL XL) extended release tablet 100 mg, 100 mg, Oral, Daily  vitamin D (CHOLECALCIFEROL) tablet 2,000 Units, 2,000 Units, Oral, Daily  traZODone (DESYREL) tablet 50 mg, 50 mg, Oral, Nightly PRN  melatonin disintegrating tablet 10 mg, 10 mg, Oral, Nightly  Physical    VITALS:  /60   Pulse 72   Temp 98 °F (36.7 °C) (Oral)   Resp 18   Ht 6' (1.829 m)   Wt 230 lb (104.3 kg)   SpO2 93%   BMI 31.19 kg/m²   CONSTITUTIONAL:  awake and alert  EYES:  extra-ocular muscles intact and vision intact  ENT:  normocepalic, without obvious abnormality  NECK:  supple, symmetrical, trachea midline  LUNGS:  no increased work of breathing, no retractions and clear to auscultation  CARDIOVASCULAR:  normal apical pulses and normal S1 and S2  ABDOMEN:  normal bowel sounds, non-distended and non-tender  MUSCULOSKELETAL:  tone is normal  NEUROLOGIC:  Mental Status Exam:  Level of Alertness:   awake  Orientation:   person, place, time  SKIN:  no bruising or bleeding  Data    CBC:   Lab Results   Component Value Date    WBC 8.6 02/08/2022    RBC 5.36 02/08/2022    HGB 17.3 02/08/2022    HCT 52.5 02/08/2022    MCV 97.9 02/08/2022    MCH 32.3 02/08/2022    MCHC 33.0 02/08/2022    RDW 14.1 02/08/2022    PLT 98 02/08/2022    MPV 11.3 02/08/2022     BMP:    Lab Results   Component Value Date     02/08/2022    K 4.7 02/08/2022    K 4.3 02/06/2022    CL 99 02/08/2022    CO2 24 02/08/2022    BUN 42 02/08/2022    LABALBU 3.6 02/08/2022    LABALBU 4.1 02/10/2012    CREATININE 1.9 02/08/2022    CALCIUM 9.5 02/08/2022    GFRAA 42 02/08/2022    LABGLOM 35 02/08/2022    GLUCOSE 104 02/08/2022    GLUCOSE 92 02/10/2012       ASSESSMENT AND PLAN      1. Acute deep vein thrombosis (DVT) of femoral vein of left lower extremity:  IVC filter in place  Now on coumadin. INR currently at 1.3  Continue to monitor. Unable to anticoagulate patient with Eliquis due to cost    2. CKD (chronic kidney disease) stage 3, GFR 30-59 ml/min  Noted mild increase in Renal function. Give one time dose of IV fluid. Continue to to monitor renal functions    3. Hyperthyroidism: on synthroid    4.   Coronary artery disease involving native coronary artery of native heart: stable  Continue to modify renal     5. Vitamin D deficiency: replace    6. Class 1 obesity due to excess calories without serious comorbidity with body mass index (BMI) of 31.0 to 31.9 in adult    7. Primary hypertension:  Continue home meds. BP is well controlled    8. Hx of Gout: continue home meds    9. Hx of ICH: per Neurosurgery recommendation, I will continue anticoagulation.

## 2022-02-09 NOTE — PROGRESS NOTES
Physical Therapy Treatment Note/Plan of Care    Room #:  4975/7111-97  Patient Name: Simon Wharton  YOB: 1945  MRN: [id number]    Date of Service: 2/9/2022     Tentative placement recommendation: Subacute vs Home Health Physical Therapy if patient meets goals  Equipment recommendation: None      Evaluating Physical Therapist: Clary Nguyen PT, DPT #[id number]      Specific Provider Orders/Date/Referring Provider :     New PT Orders 02/06/22 1617 Telephone with Jeannette Braga MD 02/06/22 0335       Admitting Diagnosis:   Acute deep vein thrombosis (DVT) of femoral vein of left lower extremity ([address] [I82.412]      Surgery: none      Patient Active Problem List   Diagnosis    CKD (chronic kidney disease) stage 3, GFR 30-59 ml/min    NSTEMI (non-ST elevated myocardial infarction) ([address]    Hyperthyroidism    History of pulmonary embolism    History of gout    Thyroid cyst    Bilateral renal cysts    Coronary artery disease involving native coronary artery of native heart    Fall at home, sequela    SDH (subdural hematoma) ([address]    Vitamin D deficiency    Toxic multinodul goiter    Acute hypoxemic respiratory failure due to COVID-19 ([address]    Class 1 obesity due to excess calories without serious comorbidity with body mass index (BMI) of 31.0 to 31.9 in adult    Acute deep vein thrombosis (DVT) of femoral vein of left lower extremity ([address]    Primary hypertension        ASSESSMENT of Current Deficits Patient exhibits decreased strength, balance and endurance impairing functional mobility, transfers, gait , gait distance, tolerance to activity and participation. Patient able to ambulate with no device and HHA this treatment session intermittently as needed, patient mildly unsteady with no loss of balance. Performed steps; slightly impulsive needing cues for pacing. Patient would benefit with increased balance activities to increase stability.         PHYSICAL THERAPY  PLAN OF CARE Physical therapy plan of care is established based on physician order,  patient diagnosis and clinical assessment    Current Treatment Recommendations:    -Bed Mobility: Lower extremity exercises  and Trunk control activities   -Sitting Balance: Incorporate reaching activities to activate trunk muscles , Hands on support to maintain midline , Facilitate active trunk muscle engagement , Facilitate postural control in all planes  and Engage in core activities to allow for movement within base of support   -Standing Balance: Perform strengthening exercises in standing to promote motor control with or without upper extremity support , Instruct patient on adequate base of support to maintain balance and Challenge balance utilizing reaching  activities beyond center of gravity    -Transfers: Provide instruction on proper hand and foot position for adequate transfer of weight onto lower extremities and use of gait device if needed, Cues for hand placement, technique and safety.  Provide stabilization to prevent fall , Facilitate weight shift forward on to lower extremities and provide necessary stabilization of bilateral lower extremities , Support transfer of weight on to lower extremities and Assist with extension of knees trunk and hip to accept weight transfer   -Gait: Gait training, Standing activities to improve: base of support, weight shift, weight bearing , Exercises to improve trunk control, Exercises to improve hip and knee control, Performance of protected weight bearing activities and Activities to increase weight bearing   -Endurance: Utilize Supervised activities to increase level of endurance to allow for safe functional mobility including transfers and gait  and Use graduated activities to promote good breathing techniques and provide support and education to maximize respiratory function  -Stairs: Stair training with instruction on proper technique and hand placement on rail    PT long term treatment goals are located in below grid    Patient and or family understand(s) diagnosis, prognosis, and plan of care. Frequency of treatments: Patient will be seen  daily. Prior Level of Function: Patient ambulated independently   Rehab Potential: good  - for baseline    Past medical history:   Past Medical History:   Diagnosis Date    Bilateral renal cysts 10/13/2014    CAD (coronary artery disease)     Cardiomyopathy ([address]     Cerebral artery occlusion with cerebral infarction ([address]     Chest pain     8-4-2016 lexiscan stress    [facility]     History of gout 10/13/2014    History of pulmonary embolism 10/13/2014    Hyperlipidemia     Hypertension     Hyperthyroidism 10/13/2014    NSTEMI (non-ST elevated myocardial infarction) ([facility] 10/13/2014    Obesity due to excess calories     Thyroid cyst 10/13/2014    Thyroid disease      Past Surgical History:   Procedure Laterality Date    BACK SURGERY      CORONARY ANGIOPLASTY WITH STENT PLACEMENT  10-    Dr. Parveen Zafar 3.0x18 MID LAD    CRANIOTOMY Right 6/25/2021    RIGHT FRONTAL REMINGTON HOLE TO DRAIN HEMATOMA AND PLACEMENT OF SUBDURAL DRAIN performed by Caty Harrell MD at [address] CATH LAB PROCEDURE  10/13/2014    Dr. Jocy Bejarano: PCI to LAD    IR IVC FILTER PLACEMENT W IMAGING  2/6/2022    IR IVC FILTER PLACEMENT W IMAGING 2/6/2022 [facility] PROCED[facility]       SUBJECTIVE:    Precautions:  Up with assistance, falls   Social history: Patient lives with spouse and daughter in a ranch home  with 4 steps  to enter bilateral Rail  Walk in shower grab bars    Equipment owned: [employer], Bedside commode, Shower chair and Elevated toilet seat,      [address]    17/24    AM-PAC Mobility Inpatient   How much difficulty turning over in bed?: None  How much difficulty sitting down on / standing up from a chair with arms?: A Little  How much difficulty moving from lying on back to EOB:  good   Dynamic Standing: fair +     Patient is Alert & Oriented x person, place, time and situation and follows directions    Sensation:  Patient  denies numbness/tingling   Edema:  no   Endurance: fair     Vitals: room air   Blood Pressure at rest  Blood Pressure during session    Heart Rate at rest  Heart Rate during session    SPO2 at rest %  SPO2 during session %     Patient education  Patient educated on role of Physical Therapy, risks of immobility, safety and plan of care, energy conservation,  importance of mobility while in hospital , ankle pumps, quad set and glut set for edema control, blood clot prevention, importance and purpose of adaptive device and adjusted to proper height for the patient. and safety      Patient response to education:   Pt verbalized understanding Pt demonstrated skill Pt requires further education in this area   Yes Partial Yes      Treatment:  Patient practiced and was instructed/facilitated in the following treatment: Patient in chair. wife present. Ambulated into hallway, performed stairs as in chart. Standing exercise. Therapeutic Exercises:  marching and hip abduction/adduction  x 10 reps      At end of session, patient in chair with spouse present call light and phone within reach,  all lines and tubes intact, nursing notified. Patient would benefit from continued skilled Physical Therapy to improve functional independence and quality of life.          Patient's/ family goals   get stronger    Time in  145  Time out  158    Total Treatment Time  13 minutes    CPT codes:    Therapeutic activities (65119)   13 minutes  1 unit(s)    Cha Johns PTA [facility] [place]#[id number]

## 2022-02-09 NOTE — CARE COORDINATION
2/9/22 1218 CM note: NO COVID TESTING THIS ADMIT, previously covid (+) 9/6/21. [facility]. Discharge was canceled yesterday d/t eliquis co-pay to expensive for patient. Patient started on heparin to coumadin bridge. Discharge plan is home and patient declines [facility]. Pts family will provide transportation at discharge.  Electronically signed by Shani Garcia RN on 2/9/2022 at 12:43 PM

## 2022-02-10 ENCOUNTER — APPOINTMENT (OUTPATIENT)
Dept: GENERAL RADIOLOGY | Age: 77
DRG: 301 | End: 2022-02-10
Payer: MEDICARE

## 2022-02-10 LAB
ALBUMIN SERPL-MCNC: 3.6 G/DL (ref 3.5–5.2)
ALP BLD-CCNC: 96 U/L (ref 40–129)
ALT SERPL-CCNC: 17 U/L (ref 0–40)
ANION GAP SERPL CALCULATED.3IONS-SCNC: 11 MMOL/L (ref 7–16)
APTT: 140.9 SEC (ref 24.5–35.1)
APTT: 82.4 SEC (ref 24.5–35.1)
AST SERPL-CCNC: 22 U/L (ref 0–39)
BILIRUB SERPL-MCNC: 0.8 MG/DL (ref 0–1.2)
BUN BLDV-MCNC: 36 MG/DL (ref 6–23)
CALCIUM SERPL-MCNC: 10.1 MG/DL (ref 8.6–10.2)
CHLORIDE BLD-SCNC: 103 MMOL/L (ref 98–107)
CO2: 25 MMOL/L (ref 22–29)
CREAT SERPL-MCNC: 1.5 MG/DL (ref 0.7–1.2)
GFR AFRICAN AMERICAN: 55
GFR NON-AFRICAN AMERICAN: 45 ML/MIN/1.73
GLUCOSE BLD-MCNC: 104 MG/DL (ref 74–99)
HCT VFR BLD CALC: 51.8 % (ref 37–54)
HEMOGLOBIN: 16.9 G/DL (ref 12.5–16.5)
INR BLD: 1.7
MCH RBC QN AUTO: 31.9 PG (ref 26–35)
MCHC RBC AUTO-ENTMCNC: 32.6 % (ref 32–34.5)
MCV RBC AUTO: 97.9 FL (ref 80–99.9)
PDW BLD-RTO: 14 FL (ref 11.5–15)
PLATELET # BLD: 79 E9/L (ref 130–450)
PLATELET CONFIRMATION: NORMAL
PMV BLD AUTO: 11 FL (ref 7–12)
POTASSIUM SERPL-SCNC: 4.9 MMOL/L (ref 3.5–5)
PROTHROMBIN TIME: 19.4 SEC (ref 9.3–12.4)
RBC # BLD: 5.29 E12/L (ref 3.8–5.8)
SODIUM BLD-SCNC: 139 MMOL/L (ref 132–146)
TOTAL PROTEIN: 6.6 G/DL (ref 6.4–8.3)
WBC # BLD: 6.3 E9/L (ref 4.5–11.5)

## 2022-02-10 PROCEDURE — 71045 X-RAY EXAM CHEST 1 VIEW: CPT

## 2022-02-10 PROCEDURE — 80053 COMPREHEN METABOLIC PANEL: CPT

## 2022-02-10 PROCEDURE — 85027 COMPLETE CBC AUTOMATED: CPT

## 2022-02-10 PROCEDURE — 6370000000 HC RX 637 (ALT 250 FOR IP): Performed by: INTERNAL MEDICINE

## 2022-02-10 PROCEDURE — 99233 SBSQ HOSP IP/OBS HIGH 50: CPT | Performed by: INTERNAL MEDICINE

## 2022-02-10 PROCEDURE — 85730 THROMBOPLASTIN TIME PARTIAL: CPT

## 2022-02-10 PROCEDURE — 6360000002 HC RX W HCPCS: Performed by: INTERNAL MEDICINE

## 2022-02-10 PROCEDURE — 85610 PROTHROMBIN TIME: CPT

## 2022-02-10 PROCEDURE — 97116 GAIT TRAINING THERAPY: CPT

## 2022-02-10 PROCEDURE — 6370000000 HC RX 637 (ALT 250 FOR IP): Performed by: FAMILY MEDICINE

## 2022-02-10 PROCEDURE — 97530 THERAPEUTIC ACTIVITIES: CPT

## 2022-02-10 PROCEDURE — 2580000003 HC RX 258: Performed by: FAMILY MEDICINE

## 2022-02-10 PROCEDURE — 36415 COLL VENOUS BLD VENIPUNCTURE: CPT

## 2022-02-10 PROCEDURE — 2060000000 HC ICU INTERMEDIATE R&B

## 2022-02-10 RX ORDER — WARFARIN SODIUM 7.5 MG/1
7.5 TABLET ORAL
Status: COMPLETED | OUTPATIENT
Start: 2022-02-10 | End: 2022-02-10

## 2022-02-10 RX ADMIN — Medication 10 ML: at 21:54

## 2022-02-10 RX ADMIN — Medication 2000 UNITS: at 09:46

## 2022-02-10 RX ADMIN — HEPARIN SODIUM 15.15 UNITS/KG/HR: 10000 INJECTION, SOLUTION INTRAVENOUS at 06:47

## 2022-02-10 RX ADMIN — METHIMAZOLE 7.5 MG: 5 TABLET ORAL at 12:48

## 2022-02-10 RX ADMIN — TRAZODONE HYDROCHLORIDE 50 MG: 50 TABLET ORAL at 21:54

## 2022-02-10 RX ADMIN — LEVETIRACETAM 500 MG: 500 TABLET, FILM COATED ORAL at 21:55

## 2022-02-10 RX ADMIN — PROBENECID AND COLCHICINE 1 TABLET: 500; .5 TABLET ORAL at 21:55

## 2022-02-10 RX ADMIN — LISINOPRIL 20 MG: 10 TABLET ORAL at 09:46

## 2022-02-10 RX ADMIN — METOPROLOL SUCCINATE 100 MG: 25 TABLET, FILM COATED, EXTENDED RELEASE ORAL at 09:46

## 2022-02-10 RX ADMIN — ALLOPURINOL 300 MG: 100 TABLET ORAL at 09:46

## 2022-02-10 RX ADMIN — LEVETIRACETAM 500 MG: 500 TABLET, FILM COATED ORAL at 09:45

## 2022-02-10 RX ADMIN — WARFARIN SODIUM 7.5 MG: 7.5 TABLET ORAL at 18:15

## 2022-02-10 ASSESSMENT — PAIN SCALES - GENERAL
PAINLEVEL_OUTOF10: 0

## 2022-02-10 NOTE — PROGRESS NOTES
SUBJECTIVE:      Patient was seen and evaluated.     No new complaints. Coumadin started after neurosurgery evaluation    . OBJECTIVE:      VITALS:  /60   Pulse 72   Temp 98 °F (36.7 °C) (Oral)   Resp 18   Ht 6' (1.829 m)   Wt 230 lb (104.3 kg)   SpO2 93%   BMI 31.19 kg/m²     CONSTITUTIONAL:  awake and alert  EYES:  extra-ocular muscles intact and vision intact  ENT:  normocepalic, without obvious abnormality  NECK:  supple, symmetrical, trachea midline  LUNGS:  no increased work of breathing, no retractions and clear to auscultation  CARDIOVASCULAR:  normal apical pulses and normal S1 and S2  ABDOMEN:  normal bowel sounds, non-distended and non-tender  MUSCULOSKELETAL:  tone is normal, no edema. No calf tenderness. NEUROLOGIC:  Mental Status Exam:  Level of Alertness:   awake  Orientation:   person, place, time  SKIN:  no bruising or bleeding    LAB. DATA:    CBC:         Lab Results   Component Value Date     WBC 6.3 02/10/2022     RBC 5.29 02/10/2022     HGB 16.9 02/10/2022     HCT 51.8 02/10/2022     MCV 97.9 02/10/2022     MCH 31.9 02/10/2022     MCHC 32.6 02/10/2022     RDW 14.0 02/10/2022     PLT 79 02/10/2022     MPV 11.0 02/10/2022      BMP:          Lab Results   Component Value Date      02/10/2022     K 4.9 02/10/2022     K 4.3 02/06/2022      02/10/2022     CO2 25 02/10/2022     BUN 36 02/10/2022     LABALBU 3.6 02/10/2022     LABALBU 4.1 02/10/2012     CREATININE 1.5 02/10/2022     CALCIUM 10.1 02/10/2022     GFRAA 55 02/10/2022     LABGLOM 45 02/10/2022     GLUCOSE 104 02/10/2022     GLUCOSE 92 02/10/2012        ASSESSMENT AND PLAN       1.  Acute deep vein thrombosis (DVT) of femoral vein of left lower extremity (HCC)  Give additional dose of 7.5 mg of Coumadin today.  Check INR in am.

## 2022-02-10 NOTE — CARE COORDINATION
2/1022 1609 CM note: NO COVID TESTING THIS ADMIT, previously covid (+) 9/6/21. [facility]. Discharge was canceled 2/8/22 d/t eliquis co-pay to expensive for patient. Patient started on heparin to coumadin bridge. Discharge plan is home and patient declines [address]. Pts family will provide transportation at discharge.  Electronically signed by Buffy Woodson RN on 2/10/2022 at 4:10 PM

## 2022-02-10 NOTE — PROGRESS NOTES
Department of Internal Medicine  General Internal Medicine  Attending Progress Note  Chief Complaint   Patient presents with    Leg Pain     left lower leg pain x 1 day, concerned about a clot     SUBJECTIVE:    Reports that he is feeling okay. Noted that he has episodic wheezing and bouts of cough. These coughing episodes are productive of whitish mucus. He denied fever and chills. He noted that he used inhaler when he tested positive for covid. He is aware of plans to get chest X ray and recommendation to stop lisinopril due to concern that this cough may be the side effects of lisinopril.      OBJECTIVE      Medications    Current Facility-Administered Medications: warfarin (COUMADIN) tablet 7.5 mg, 7.5 mg, Oral, Once  warfarin placeholder: dosing by provider, , Other, RX Placeholder  heparin (porcine) injection 8,340 Units, 80 Units/kg, IntraVENous, PRN  heparin (porcine) injection 4,170 Units, 40 Units/kg, IntraVENous, PRN  heparin 25,000 units in dextrose 5% 250 mL (premix) infusion, 5-30 Units/kg/hr, IntraVENous, Continuous  sodium chloride flush 0.9 % injection 5-40 mL, 5-40 mL, IntraVENous, 2 times per day  sodium chloride flush 0.9 % injection 5-40 mL, 5-40 mL, IntraVENous, PRN  0.9 % sodium chloride infusion, 25 mL, IntraVENous, PRN  ondansetron (ZOFRAN-ODT) disintegrating tablet 4 mg, 4 mg, Oral, Q8H PRN **OR** ondansetron (ZOFRAN) injection 4 mg, 4 mg, IntraVENous, Q6H PRN  acetaminophen (TYLENOL) tablet 650 mg, 650 mg, Oral, Q6H PRN **OR** acetaminophen (TYLENOL) suppository 650 mg, 650 mg, Rectal, Q6H PRN  senna (SENOKOT) tablet 8.6 mg, 1 tablet, Oral, Daily PRN  allopurinol (ZYLOPRIM) tablet 300 mg, 300 mg, Oral, Daily  colchicine-probenecid 0.5-500 MG per tablet 1 tablet, 1 tablet, Oral, Daily  levETIRAcetam (KEPPRA) tablet 500 mg, 500 mg, Oral, BID  [Held by provider] lisinopril (PRINIVIL;ZESTRIL) tablet 20 mg, 20 mg, Oral, Daily  methIMAzole (TAPAZOLE) tablet 7.5 mg, 7.5 mg, Oral, Daily  metoprolol succinate (TOPROL XL) extended release tablet 100 mg, 100 mg, Oral, Daily  vitamin D (CHOLECALCIFEROL) tablet 2,000 Units, 2,000 Units, Oral, Daily  traZODone (DESYREL) tablet 50 mg, 50 mg, Oral, Nightly PRN  melatonin disintegrating tablet 10 mg, 10 mg, Oral, Nightly  Physical    VITALS:  /63   Pulse 71   Temp 98.6 °F (37 °C) (Oral)   Resp 18   Ht 6' (1.829 m)   Wt 230 lb (104.3 kg)   SpO2 93%   BMI 31.19 kg/m²   CONSTITUTIONAL:  awake  EYES:  extra-ocular muscles intact and vision intact  ENT:  normocepalic, without obvious abnormality  BACK:  symmetric  LUNGS:  good air exchange and no crackles or wheezing  CARDIOVASCULAR:  normal apical pulses and normal S1 and S2  ABDOMEN:  normal bowel sounds, soft and non-tender  MUSCULOSKELETAL:  there is no redness, warmth, or swelling of the joints  NEUROLOGIC:  Mental Status Exam:  Level of Alertness:   awake  Orientation:   person, place, time  SKIN:  no bruising or bleeding  Data    CBC:   Lab Results   Component Value Date    WBC 6.3 02/10/2022    RBC 5.29 02/10/2022    HGB 16.9 02/10/2022    HCT 51.8 02/10/2022    MCV 97.9 02/10/2022    MCH 31.9 02/10/2022    MCHC 32.6 02/10/2022    RDW 14.0 02/10/2022    PLT 79 02/10/2022    MPV 11.0 02/10/2022     BMP:    Lab Results   Component Value Date     02/10/2022    K 4.9 02/10/2022    K 4.3 02/06/2022     02/10/2022    CO2 25 02/10/2022    BUN 36 02/10/2022    LABALBU 3.6 02/10/2022    LABALBU 4.1 02/10/2012    CREATININE 1.5 02/10/2022    CALCIUM 10.1 02/10/2022    GFRAA 55 02/10/2022    LABGLOM 45 02/10/2022    GLUCOSE 104 02/10/2022    GLUCOSE 92 02/10/2012       ASSESSMENT AND PLAN      1. Acute deep vein thrombosis (DVT) of femoral vein of left lower extremity (HCC)  Give additional dose of 7.5 mg of Coumadin today. Check INR in am.      CKD (chronic kidney disease) stage 3, GFR 30-59 ml/min: improved with fluid challenge.  Back to baseline      Hyperthyroidism: methimazole Coronary artery disease involving native coronary artery of native heart      Vitamin D deficiency      Class 1 obesity due to excess calories without serious comorbidity with body mass index (BMI) of 31.0 to 31.9 in adult      Primary hypertension      Thrombocytopenia: chronic low platelet with some decrease since heparin drip was initiated. Continue to monitor if drops in am, check cbc in am      Cough: suspects related to Lisinopril. Hold lisinopril get chest X ray. There were no overt wheezing on exam. Patient can continue to use home inhaler.

## 2022-02-10 NOTE — PLAN OF CARE
Problem: Venous Thromboembolism:  Goal: Will show no signs or symptoms of venous thromboembolism  Description: Will show no signs or symptoms of venous thromboembolism  Outcome: Met This Shift     Problem: Venous Thromboembolism:  Goal: Absence of signs or symptoms of impaired coagulation  Description: Absence of signs or symptoms of impaired coagulation  Outcome: Met This Shift

## 2022-02-10 NOTE — PROGRESS NOTES
Physical Therapy Treatment Note/Plan of Care    Room #:  6933/4293-73  Patient Name: Davina Javier  YOB: 1945  MRN: [id number]    Date of Service: 2/10/2022     Tentative placement recommendation: Subacute vs Home Health Physical Therapy if patient meets goals  Equipment recommendation: None      Evaluating Physical Therapist: Jess Sanchez PT, DPT #[id number]      Specific Provider Orders/Date/Referring Provider :     New PT Orders 02/06/22 1617 Telephone with Jayy Thapa MD 02/06/22 6692       Admitting Diagnosis:   Acute deep vein thrombosis (DVT) of femoral vein of left lower extremity ([facility] [address] [I82.412]      Surgery: none      Patient Active Problem List   Diagnosis    CKD (chronic kidney disease) stage 3, GFR 30-59 ml/min    NSTEMI (non-ST elevated myocardial infarction) ([address]    Hyperthyroidism    History of pulmonary embolism    History of gout    Thyroid cyst    Bilateral renal cysts    Coronary artery disease involving native coronary artery of native heart    Fall at home, sequela    SDH (subdural hematoma) ([facility] [address]    Vitamin D deficiency    Toxic multinodul goiter    Acute hypoxemic respiratory failure due to COVID-19 ([facility] [address]    Class 1 obesity due to excess calories without serious comorbidity with body mass index (BMI) of 31.0 to 31.9 in adult    Acute deep vein thrombosis (DVT) of femoral vein of left lower extremity ([address]    Primary hypertension        ASSESSMENT of Current Deficits Patient exhibits decreased strength, balance and endurance impairing functional mobility, transfers, gait , gait distance, tolerance to activity and participation. Patient able to ambulate with IV pole and no loss of balance. Patient performed stair training with railings and no issues. Performed steps; slightly impulsive needing cues for pacing. Patient would benefit with increased balance activities to increase stability.         PHYSICAL THERAPY  PLAN OF CARE       Physical therapy plan of care is established based on physician order,  patient diagnosis and clinical assessment    Current Treatment Recommendations:    -Bed Mobility: Lower extremity exercises  and Trunk control activities   -Sitting Balance: Incorporate reaching activities to activate trunk muscles , Hands on support to maintain midline , Facilitate active trunk muscle engagement , Facilitate postural control in all planes  and Engage in core activities to allow for movement within base of support   -Standing Balance: Perform strengthening exercises in standing to promote motor control with or without upper extremity support , Instruct patient on adequate base of support to maintain balance and Challenge balance utilizing reaching  activities beyond center of gravity    -Transfers: Provide instruction on proper hand and foot position for adequate transfer of weight onto lower extremities and use of gait device if needed, Cues for hand placement, technique and safety.  Provide stabilization to prevent fall , Facilitate weight shift forward on to lower extremities and provide necessary stabilization of bilateral lower extremities , Support transfer of weight on to lower extremities and Assist with extension of knees trunk and hip to accept weight transfer   -Gait: Gait training, Standing activities to improve: base of support, weight shift, weight bearing , Exercises to improve trunk control, Exercises to improve hip and knee control, Performance of protected weight bearing activities and Activities to increase weight bearing   -Endurance: Utilize Supervised activities to increase level of endurance to allow for safe functional mobility including transfers and gait  and Use graduated activities to promote good breathing techniques and provide support and education to maximize respiratory function  -Stairs: Stair training with instruction on proper technique and hand placement on rail    PT long term treatment goals are located in below grid    Patient and or family understand(s) diagnosis, prognosis, and plan of care. Frequency of treatments: Patient will be seen  daily. Prior Level of Function: Patient ambulated independently   Rehab Potential: good  - for baseline    Past medical history:   Past Medical History:   Diagnosis Date    Bilateral renal cysts 10/13/2014    CAD (coronary artery disease)     Cardiomyopathy ([facility]     Cerebral artery occlusion with cerebral infarction ([facility] [address] [facility]     Chest pain     8-4-2016 lexiscan stress    Hemorrhagic stroke ([facility] [address] [facility]     History of gout 10/13/2014    History of pulmonary embolism 10/13/2014    Hyperlipidemia     Hypertension     Hyperthyroidism 10/13/2014    NSTEMI (non-ST elevated myocardial infarction) ([facility] [address] 10/13/2014    Obesity due to excess calories     Thyroid cyst 10/13/2014    Thyroid disease      Past Surgical History:   Procedure Laterality Date    BACK SURGERY      CORONARY ANGIOPLASTY WITH STENT PLACEMENT  10-    Dr. Danny Pink 3.0x18 MID LAD    CRANIOTOMY Right 6/25/2021    RIGHT FRONTAL REMINGTON HOLE TO DRAIN HEMATOMA AND PLACEMENT OF SUBDURAL DRAIN performed by Mercedes Barclay MD at [address] CATH LAB PROCEDURE  10/13/2014    Dr. Alvarez Files: PCI to LAD    IR IVC FILTER PLACEMENT W IMAGING  2/6/2022    IR IVC FILTER PLACEMENT W IMAGING 2/6/2022 [facility] SPECIAL PROCEDURES       SUBJECTIVE:    Precautions:  Up with assistance, falls   Social history: Patient lives with spouse and daughter in a ranch home  with 4 steps  to enter bilateral Rail  Walk in shower grab bars    Equipment owned: [facility], Bedside commode, Shower chair and Elevated toilet seat,      [address]   17/24    AM-PAC Mobility Inpatient   How much difficulty turning over in bed?: None  How much difficulty sitting down on / standing up from a chair with arms?: None  How much difficulty moving from lying on back to sitting on side of bed?: None  How much help from another person moving to and from a bed to a chair?: None  How much help from another person needed to walk in hospital room?: A Little  How much help from another person for climbing 3-5 steps with a railing?: A Little  AM-PAC Inpatient Mobility Raw Score : 22  AM-PAC Inpatient T-Scale Score : 53.28  Mobility Inpatient CMS 0-100% Score: 20.91  Mobility Inpatient CMS G-Code Modifier : [address] cleared patient for PT treatment. Patient sitting in bedside chair at start of session. OBJECTIVE;   Initial Evaluation  Date: 2/7/2022 Treatment Date:    2/10/2022   Short Term/ Long Term   Goals   Was pt agreeable to Eval/treatment? Yes Yes  To be met in 3 days   Pain level   0/10   0/10    Bed Mobility    Rolling: Supervision     Supine to sit: Supervision     Sit to supine: Supervision     Scooting: Supervision    Rolling: Not assessed patient in chair   Supine to sit: Not assessed    Sit to supine: Not assessed    Scooting: Not assessed     Rolling: Independent    Supine to sit: Independent    Sit to supine: Independent    Scooting: Independent     Transfers Sit to stand: Minimal assist of 1  Sit to stand: Independent      Sit to stand: Independent    Ambulation    2 x 75 feet using  wheeled walker with Minimal assist of 1   for walker control, walker approximation, balance and safety 2x100 feet using  IV pole with Supervision    cues for safety, pacing and balance    > 200 feet using  least restrictive device versus no device with Independent    Stair negotiation: ascended and descended   Not assessed  5steps x4 sets with 2 hand rails needing supervision.       4 steps with 2 HR with Mod I   ROM Within functional limits    Increase range of motion 10% of affected joints    Strength BUE:  refer to OT eval  RLE:  4/5  LLE:  4/5  Increase strength in affected mm groups by 1/3 grade   Balance Sitting EOB:  good -  Dynamic Standing:  fair  Sitting EOB: not assessed   Dynamic Standing: fair+     Sitting EOB:  good   Dynamic Standing: fair +     Patient is Alert & Oriented x person, place, time and situation and follows directions    Sensation:  Patient  denies numbness/tingling   Edema:  no   Endurance: fair +    Vitals: room air   Blood Pressure at rest  Blood Pressure during session    Heart Rate at rest  Heart Rate during session    SPO2 at rest %  SPO2 during session %     Patient education  Patient educated on role of Physical Therapy, risks of immobility, safety and plan of care, energy conservation,  importance of mobility while in hospital , importance and purpose of adaptive device and adjusted to proper height for the patient. , safety , stair training , proper use/technique of incentive spirometer and seated exercises     Patient response to education:   Pt verbalized understanding Pt demonstrated skill Pt requires further education in this area   Yes Partial Yes      Treatment:  Patient practiced and was instructed/facilitated in the following treatment: Patient in chair. Patient stood and Ambulated into hallway, performed stairs as in chart and returned to room with incentive spirometer provided. Therapeutic Exercises:  not performed  x reps      At end of session, patient in chair with   call light and phone within reach,  all lines and tubes intact, nursing notified. Patient would benefit from continued skilled Physical Therapy to improve functional independence and quality of life.          Patient's/ family goals   get stronger    Time in  312  Time out  336    Total Treatment Time  24 minutes    CPT codes:  Therapeutic activities (32230)   10 minutes  1 unit(s)  Gait Training (51050) 14 minutes 1 unit(s)    Darrian Roque PTA [facility]#[id number]

## 2022-02-11 LAB
APTT: 97 SEC (ref 24.5–35.1)
INR BLD: 2.3
PROTHROMBIN TIME: 27.2 SEC (ref 9.3–12.4)

## 2022-02-11 PROCEDURE — 97530 THERAPEUTIC ACTIVITIES: CPT

## 2022-02-11 PROCEDURE — 2580000003 HC RX 258: Performed by: FAMILY MEDICINE

## 2022-02-11 PROCEDURE — 85730 THROMBOPLASTIN TIME PARTIAL: CPT

## 2022-02-11 PROCEDURE — 85610 PROTHROMBIN TIME: CPT

## 2022-02-11 PROCEDURE — 99232 SBSQ HOSP IP/OBS MODERATE 35: CPT | Performed by: INTERNAL MEDICINE

## 2022-02-11 PROCEDURE — 2060000000 HC ICU INTERMEDIATE R&B

## 2022-02-11 PROCEDURE — 36415 COLL VENOUS BLD VENIPUNCTURE: CPT

## 2022-02-11 PROCEDURE — 6370000000 HC RX 637 (ALT 250 FOR IP): Performed by: INTERNAL MEDICINE

## 2022-02-11 PROCEDURE — 6370000000 HC RX 637 (ALT 250 FOR IP): Performed by: FAMILY MEDICINE

## 2022-02-11 PROCEDURE — 6360000002 HC RX W HCPCS: Performed by: INTERNAL MEDICINE

## 2022-02-11 RX ORDER — WARFARIN SODIUM 3 MG/1
3 TABLET ORAL
Status: COMPLETED | OUTPATIENT
Start: 2022-02-11 | End: 2022-02-11

## 2022-02-11 RX ADMIN — TRAZODONE HYDROCHLORIDE 50 MG: 50 TABLET ORAL at 20:52

## 2022-02-11 RX ADMIN — Medication 10 MG: at 20:48

## 2022-02-11 RX ADMIN — PROBENECID AND COLCHICINE 1 TABLET: 500; .5 TABLET ORAL at 20:47

## 2022-02-11 RX ADMIN — WARFARIN SODIUM 3 MG: 3 TABLET ORAL at 17:32

## 2022-02-11 RX ADMIN — LEVETIRACETAM 500 MG: 500 TABLET, FILM COATED ORAL at 08:34

## 2022-02-11 RX ADMIN — METHIMAZOLE 7.5 MG: 5 TABLET ORAL at 12:19

## 2022-02-11 RX ADMIN — ALLOPURINOL 300 MG: 100 TABLET ORAL at 08:33

## 2022-02-11 RX ADMIN — HEPARIN SODIUM 12.18 UNITS/KG/HR: 10000 INJECTION, SOLUTION INTRAVENOUS at 04:03

## 2022-02-11 RX ADMIN — Medication 2000 UNITS: at 08:34

## 2022-02-11 RX ADMIN — Medication 10 ML: at 21:02

## 2022-02-11 RX ADMIN — LEVETIRACETAM 500 MG: 500 TABLET, FILM COATED ORAL at 20:48

## 2022-02-11 RX ADMIN — METOPROLOL SUCCINATE 100 MG: 25 TABLET, FILM COATED, EXTENDED RELEASE ORAL at 08:34

## 2022-02-11 ASSESSMENT — PAIN SCALES - GENERAL
PAINLEVEL_OUTOF10: 0
PAINLEVEL_OUTOF10: 0

## 2022-02-11 NOTE — PROGRESS NOTES
OCCUPATIONAL THERAPY BEDSIDE TREATMENT NOTE   [facility]  Fatoumata Cardenas. [place]     Date:2022  Patient Name: Magali Mejia  MRN: [id number]  : 1945  Room: [address] [place] [address]     Evaluating OT: Elton Magaña OTR/L; [id number]        Referring Provider and Orders/Date:   OT eval and treat Start: 22 1630, End: 22 1630, ONE TIME, Standing Count: 1 Occurrences, Jules Olivares MD     Diagnosis:   1. Acute deep vein thrombosis (DVT) of femoral vein of left lower extremity [facility])          Pertinent Medical History:        Past Medical History        Past Medical History:   Diagnosis Date    Bilateral renal cysts 10/13/2014    CAD (coronary artery disease)      Cardiomyopathy ([facility] [address]      Cerebral artery occlusion with cerebral infarction [facility])      Chest pain       2016 lexiscan stress    Hemorrhagic stroke ([facility] [address]      History of gout 10/13/2014    History of pulmonary embolism 10/13/2014    Hyperlipidemia      Hypertension      Hyperthyroidism 10/13/2014    NSTEMI (non-ST elevated myocardial infarction) ([facility] [address] 10/13/2014    Obesity due to excess calories      Thyroid cyst 10/13/2014    Thyroid disease               Past Surgical History         Past Surgical History:   Procedure Laterality Date    BACK SURGERY        CORONARY ANGIOPLASTY WITH STENT PLACEMENT   10-     Dr. Laura Soria- [facility] 3.0x18 MID LAD    CRANIOTOMY Right 2021     RIGHT FRONTAL REMINGTON HOLE TO DRAIN HEMATOMA AND PLACEMENT OF SUBDURAL DRAIN performed by Glory Payton MD at [address] [facility] CATH LAB PROCEDURE   10/13/2014     Dr. Laura Soria: PCI to LAD           Precautions:  Fall Risk    Recommended placement: Home     Assessment of current deficits     [x]? Functional mobility           [x]? ADLs           [x]? Strength                   []?Cognition     [x]? Functional transfers         [x]? IADLs         [x]?  Safety Awareness [x]?Endurance     []? Fine Coordination                        [x]? Balance      []? Vision/perception    []? Sensation       [x]? Gross Motor Coordination            []? ROM           []? Delirium                   []? Motor Control      OT PLAN OF CARE   OT POC based on physician orders, patient diagnosis and results of clinical assessment     Frequency/Duration 1-3 days/wk for 2 weeks PRN   Specific OT Treatment Interventions to include:   * Instruction/training on adapted ADL techniques and AE recommendations to increase functional independence within precautions       * Training on energy conservation strategies, correct breathing pattern and techniques to improve independence/tolerance for self-care routine  * Functional transfer/mobility training/DME recommendations for increased independence, safety, and fall prevention  * Patient/Family education to increase follow through with safety techniques and functional independence  * Recommendation of environmental modifications for increased safety with functional transfers/mobility and ADLs  * Therapeutic exercise to improve motor endurance, ROM, and functional strength for ADLs/functional transfers  * Therapeutic activities to facilitate/challenge dynamic balance, stand tolerance for increased safety and independence with ADLs  * Therapeutic activities to facilitate gross/fine motor skills for increased independence with ADLs  * Positioning to improve skin integrity, interaction with environment and functional independence      Recommended Adaptive Equipment/DME: TBD       Home Living: Pt lives with wife and daughter in a 1story home with 4 steps to enter. Does not have to use basement.      Bathroom setup: walk in shower with built in seat; shower chair, elevated toilet    DME owned: [address], [employer], cane      Prior Level of Function: indep with ADLs , indep with IADLs; ambulated indep   Driving: yes   Occupation: retired   Enjoys: swimming, coaching high school football     Pain Level: none;   Cognition: A&O: 4/4; Follows 4 step directions              Memory:  Intact              Sequencing:  Intact              Problem solving:  Intact              Judgement/safety:  Intact     AM-PAC Daily Activity Inpatient   How much help for putting on and taking off regular lower body clothing?: None  How much help for Bathing?: None  How much help for Toileting?: None  How much help for putting on and taking off regular upper body clothing?: None  How much help for taking care of personal grooming?: None  How much help for eating meals?: None  AM-PAC Inpatient Daily Activity Raw Score: 24  AM-PAC Inpatient ADL T-Scale Score : 57.54  ADL Inpatient CMS 0-100% Score: 0  ADL Inpatient CMS G-Code Modifier : [address]                Functional Assessment:      Initial Eval Status  Date: 2/7/2022   Treatment Status  Date: 2/11/22 STGs = LTGs  Time frame: 10-14 days   Feeding Independent   Indep   NA-PLOF   Grooming Supervision standing at the sink for oral care, hand wash and face wash. Indep to wash hands at sink; pt declined oral hygiene/face wash  Independent    UB Dressing Stand by Assist to don hoodie from sitting EOB. Assist to manage lines  Indep with overhead shirt Independent    LB Dressing Stand by Assist to don socks and shoes with cues for energy conservation  Indep  Pt donned shoes and tied laces while seated in chair; pants donned prior to OT session    Independent    Bathing Stand by Assist for sponge bathing from sitting/standing EOB N/T  Independent    Toileting Stand by Assist with cues for safety with sitting on/off toilet  N/T  Pt reports transferring on/off commode indep during this admission    Independent    Bed Mobility  Supine to sit:  Independent   Sit to supine: Independent     N/T  As patient was up in chair at start/end of session     Not Appropriate-PLOF      Functional Transfers Stand by Assist without AD from bed and toilet  Indep with sit <> stand transfers from household surfaces Independent    Functional Mobility Stand by Assist without AD with fear of L leg giving out on him.   Supervision with IV pole to/from bathroom, household distances   Independent    Balance Sitting:     Static: good    Dynamic:fair+  Standing: fair  Fair plus/good standing balance Sitting:     Dynamic:good  Standing: fair+   Activity Tolerance Vitals with activity: WFL; sitting EOB for 10 period. Standing 1-2min   Fair plus/good; pt eager for d/c  Increase standing tolerance for >4min with stable vital signs for carry over into toileting, functional tranfers and indep in ADLs   Visual/  Perceptual Glasses: not Present; WFL     Reports change in vision since admission: No      NA      Hand Dominance  [x]? Right   []? Left     AROM (PROM) Strength Additional Info:    RUE  WFL 5/5 good  and  FMC/dexterity noted during ADL tasks  Opposition [x]? Intact []? Impaired  Finger to nose [x]? Intact []? Impaired      LUE WFL 5/5 good  and FMC/dexterity noted during ADL tasks  Opposition [x]? Intact []? Impaired  Finger to nose [x]? Intact []? Impaired      Hearing: WFL   Sensation:  No c/o numbness or tingling   Tone: WFL   Edema: none    Comments: Upon arrival to the room the patient was in chair. At end of the session, the patient was in chair. Call light and phone within reach. Pt required verbal cues and instruction as noted above for safe facilitation and completion of tasks. Therapist provided skilled monitoring of the patient's response during treatment session. Prior to and at the end of session, environmental modifications/line management completed for patients safety and efficiency of treatment session. Pt has met his goals with OT and reports no questions/concerns from an OT standpoint prior to d/c. Skilled OT services no longer warranted. Will d/c from OT services, please re-consult if there is a change in patient's physical/medical status.       Pt has made good progress towards set goals. Goals met 2/11/22       Treatment time includes thorough review of current medical information, gathering information on past medical history/social history and prior level of function, completion of standardized testing/informal observation of tasks, assessment of data, and development of POC/Goals.     Time In: 9:30 AM    Time Out: 9:40 AM                  Min Units   OT Eval Low 69530     OT Eval Medium 52992     OT Eval High 72566     OT Re-Eval 88045          ADL/Self Care 68804     Therapeutic Activities 33581 10 1   Therapeutic Ex 27893     Orthotic Management 26525     Neuro Re-Ed 00344     Non-Billable Time     TOTAL TIMED TREATMENT 10 1     Manny Houston OTR/L #[id number]

## 2022-02-11 NOTE — PROGRESS NOTES
Department of Internal Medicine  General Internal Medicine  Attending Progress Note  Chief Complaint   Patient presents with    Leg Pain     left lower leg pain x 1 day, concerned about a clot     SUBJECTIVE:    Reports that he is doing better. He is aware of plans to continue to monitor his INR and that INR has become therapeutic today and goal is to await for 2 consecutive therapeutic level.      OBJECTIVE      Medications    Current Facility-Administered Medications: warfarin (COUMADIN) tablet 3 mg, 3 mg, Oral, Once  warfarin placeholder: dosing by provider, , Other, RX Placeholder  heparin (porcine) injection 8,340 Units, 80 Units/kg, IntraVENous, PRN  heparin (porcine) injection 4,170 Units, 40 Units/kg, IntraVENous, PRN  heparin 25,000 units in dextrose 5% 250 mL (premix) infusion, 5-30 Units/kg/hr, IntraVENous, Continuous  sodium chloride flush 0.9 % injection 5-40 mL, 5-40 mL, IntraVENous, 2 times per day  sodium chloride flush 0.9 % injection 5-40 mL, 5-40 mL, IntraVENous, PRN  0.9 % sodium chloride infusion, 25 mL, IntraVENous, PRN  ondansetron (ZOFRAN-ODT) disintegrating tablet 4 mg, 4 mg, Oral, Q8H PRN **OR** ondansetron (ZOFRAN) injection 4 mg, 4 mg, IntraVENous, Q6H PRN  acetaminophen (TYLENOL) tablet 650 mg, 650 mg, Oral, Q6H PRN **OR** acetaminophen (TYLENOL) suppository 650 mg, 650 mg, Rectal, Q6H PRN  senna (SENOKOT) tablet 8.6 mg, 1 tablet, Oral, Daily PRN  allopurinol (ZYLOPRIM) tablet 300 mg, 300 mg, Oral, Daily  colchicine-probenecid 0.5-500 MG per tablet 1 tablet, 1 tablet, Oral, Daily  levETIRAcetam (KEPPRA) tablet 500 mg, 500 mg, Oral, BID  [Held by provider] lisinopril (PRINIVIL;ZESTRIL) tablet 20 mg, 20 mg, Oral, Daily  methIMAzole (TAPAZOLE) tablet 7.5 mg, 7.5 mg, Oral, Daily  metoprolol succinate (TOPROL XL) extended release tablet 100 mg, 100 mg, Oral, Daily  vitamin D (CHOLECALCIFEROL) tablet 2,000 Units, 2,000 Units, Oral, Daily  traZODone (DESYREL) tablet 50 mg, 50 mg, Oral, Nightly PRN  melatonin disintegrating tablet 10 mg, 10 mg, Oral, Nightly  Physical    VITALS:  /63   Pulse 63   Temp 97.7 °F (36.5 °C) (Oral)   Resp 12   Ht 6' (1.829 m)   Wt 230 lb (104.3 kg)   SpO2 97%   BMI 31.19 kg/m²   CONSTITUTIONAL:  awake and alert  EYES:  extra-ocular muscles intact and vision intact  ENT:  normocepalic, without obvious abnormality  NECK:  supple, symmetrical, trachea midline  LUNGS:  no increased work of breathing and clear to auscultation  CARDIOVASCULAR:  normal apical pulses and normal S1 and S2  ABDOMEN:  normal bowel sounds, non-distended and non-tender  MUSCULOSKELETAL:  there is no redness, warmth, or swelling of the joints  NEUROLOGIC:  Mental Status Exam:  Level of Alertness:   awake  Orientation:   person, place, time  SKIN:  no bruising or bleeding  Data    CBC:   Lab Results   Component Value Date    WBC 6.3 02/10/2022    RBC 5.29 02/10/2022    HGB 16.9 02/10/2022    HCT 51.8 02/10/2022    MCV 97.9 02/10/2022    MCH 31.9 02/10/2022    MCHC 32.6 02/10/2022    RDW 14.0 02/10/2022    PLT 79 02/10/2022    MPV 11.0 02/10/2022     BMP:    Lab Results   Component Value Date     02/10/2022    K 4.9 02/10/2022    K 4.3 02/06/2022     02/10/2022    CO2 25 02/10/2022    BUN 36 02/10/2022    LABALBU 3.6 02/10/2022    LABALBU 4.1 02/10/2012    CREATININE 1.5 02/10/2022    CALCIUM 10.1 02/10/2022    GFRAA 55 02/10/2022    LABGLOM 45 02/10/2022    GLUCOSE 104 02/10/2022    GLUCOSE 92 02/10/2012       ASSESSMENT AND PLAN      1. Acute deep vein thrombosis (DVT) of femoral vein of left lower extremity (HCC)    2. CKD (chronic kidney disease) stage 3, GFR 30-59 ml/min    3. Hyperthyroidism    4. Coronary artery disease involving native coronary artery of native heart    5. Vitamin D deficiency    6. Class 1 obesity due to excess calories without serious comorbidity with body mass index (BMI) of 31.0 to 31.9 in adult    7. Primary hypertension    8. Thrombocytopenia:    Plans:  INR is therapeutic today. Will continue heparin drip until INR is therapeutic on 2 consecutive days. Give a lower dose of coumadin today. Continue to monitor.    BP is well controlled

## 2022-02-11 NOTE — PROGRESS NOTES
Physical Therapy Treatment Note/Plan of Care    Room #:  5671/5572-57  Patient Name: Davina Javier  YOB: 1945  MRN: [id number]    Date of Service: 2/11/2022     Tentative placement recommendation: Subacute vs Home Health Physical Therapy if patient meets goals  Equipment recommendation: None      Evaluating Physical Therapist: Jess Sanchez PT, DPT #[id number]      Specific Provider Orders/Date/Referring Provider :     New PT Orders 02/06/22 1617 Telephone with Jayy Thapa MD 02/06/22 5893       Admitting Diagnosis:   Acute deep vein thrombosis (DVT) of femoral vein of left lower extremity ([facility] [address] [I82.412]      Surgery: none      Patient Active Problem List   Diagnosis    CKD (chronic kidney disease) stage 3, GFR 30-59 ml/min    NSTEMI (non-ST elevated myocardial infarction) ([address]    Hyperthyroidism    History of pulmonary embolism    History of gout    Thyroid cyst    Bilateral renal cysts    Coronary artery disease involving native coronary artery of native heart    Fall at home, sequela    SDH (subdural hematoma) ([facility] [address]    Vitamin D deficiency    Toxic multinodul goiter    Acute hypoxemic respiratory failure due to COVID-19 ([facility] [address]    Class 1 obesity due to excess calories without serious comorbidity with body mass index (BMI) of 31.0 to 31.9 in adult    Acute deep vein thrombosis (DVT) of femoral vein of left lower extremity ([address]    Primary hypertension        ASSESSMENT of Current Deficits Patient exhibits decreased strength, balance and endurance impairing functional mobility, transfers, gait , gait distance, tolerance to activity and participation. Pt able to AMB w/ no device and no LOB. Pt performed stair training with bilateral railings and no issues, able to perform better than last session. Pt has difficulties with tandem stance but no LOB with HHA. Patient would benefit with increased balance activities to increase stability.         PHYSICAL THERAPY  PLAN OF CARE Physical therapy plan of care is established based on physician order,  patient diagnosis and clinical assessment    Current Treatment Recommendations:    -Bed Mobility: Lower extremity exercises  and Trunk control activities   -Sitting Balance: Incorporate reaching activities to activate trunk muscles , Hands on support to maintain midline , Facilitate active trunk muscle engagement , Facilitate postural control in all planes  and Engage in core activities to allow for movement within base of support   -Standing Balance: Perform strengthening exercises in standing to promote motor control with or without upper extremity support , Instruct patient on adequate base of support to maintain balance and Challenge balance utilizing reaching  activities beyond center of gravity    -Transfers: Provide instruction on proper hand and foot position for adequate transfer of weight onto lower extremities and use of gait device if needed, Cues for hand placement, technique and safety.  Provide stabilization to prevent fall , Facilitate weight shift forward on to lower extremities and provide necessary stabilization of bilateral lower extremities , Support transfer of weight on to lower extremities and Assist with extension of knees trunk and hip to accept weight transfer   -Gait: Gait training, Standing activities to improve: base of support, weight shift, weight bearing , Exercises to improve trunk control, Exercises to improve hip and knee control, Performance of protected weight bearing activities and Activities to increase weight bearing   -Endurance: Utilize Supervised activities to increase level of endurance to allow for safe functional mobility including transfers and gait  and Use graduated activities to promote good breathing techniques and provide support and education to maximize respiratory function  -Stairs: Stair training with instruction on proper technique and hand placement on rail    PT long term treatment goals are located in below grid    Patient and or family understand(s) diagnosis, prognosis, and plan of care. Frequency of treatments: Patient will be seen  daily. Prior Level of Function: Patient ambulated independently   Rehab Potential: good  - for baseline    Past medical history:   Past Medical History:   Diagnosis Date    Bilateral renal cysts 10/13/2014    CAD (coronary artery disease)     Cardiomyopathy ([address]     Cerebral artery occlusion with cerebral infarction ([address]     Chest pain     8-4-2016 lexiscan stress    Hemorrhagic stroke ([address]     History of gout 10/13/2014    History of pulmonary embolism 10/13/2014    Hyperlipidemia     Hypertension     Hyperthyroidism 10/13/2014    NSTEMI (non-ST elevated myocardial infarction) ([facility] [address] 10/13/2014    Obesity due to excess calories     Thyroid cyst 10/13/2014    Thyroid disease      Past Surgical History:   Procedure Laterality Date    BACK SURGERY      CORONARY ANGIOPLASTY WITH STENT PLACEMENT  10-    Dr. Everette Baumgarten 3.0x18 MID LAD    CRANIOTOMY Right 6/25/2021    RIGHT FRONTAL REMINGTON HOLE TO DRAIN HEMATOMA AND PLACEMENT OF SUBDURAL DRAIN performed by Glory Payton MD at [address] CATH LAB PROCEDURE  10/13/2014    Dr. Laura Soria: PCI to LAD    IR IVC FILTER PLACEMENT W IMAGING  2/6/2022    IR IVC FILTER PLACEMENT W IMAGING 2/6/2022 [facility]       SUBJECTIVE:    Precautions:  Up with assistance, falls   Social history: Patient lives with spouse and daughter in a ranch home  with 4 steps  to enter bilateral Rail  Walk in shower grab bars    Equipment owned: Katlyn Vigil, Bedside commode, Shower chair and Elevated toilet seat,      [address]    17/24    AM-PAC Mobility Inpatient   How much difficulty turning over in bed?: A Little  How much difficulty sitting down on / standing up from a chair with arms?: A Little  How much difficulty moving from lying on back to sitting on side of bed?: A Little  How much help from another person moving to and from a bed to a chair?: A Little  How much help from another person needed to walk in hospital room?: A Little  How much help from another person for climbing 3-5 steps with a railing?: A Little  AM-PAC Inpatient Mobility Raw Score : 18  AM-PAC Inpatient T-Scale Score : 43.63  Mobility Inpatient CMS 0-100% Score: 46.58  Mobility Inpatient CMS G-Code Modifier : CK    Nursing cleared patient for PT treatment. OBJECTIVE;   Initial Evaluation  Date: 2/7/2022 Treatment Date:    2/11/2022   Short Term/ Long Term   Goals   Was pt agreeable to Eval/treatment? Yes Yes  To be met in 3 days   Pain level   0/10   0/10    Bed Mobility    Rolling: Supervision     Supine to sit: Supervision     Sit to supine: Supervision     Scooting: Supervision    Rolling: Not assessed patient in chair   Supine to sit: Not assessed    Sit to supine: Not assessed    Scooting: Not assessed     Rolling: Independent    Supine to sit: Independent    Sit to supine: Independent    Scooting: Independent     Transfers Sit to stand: Minimal assist of 1  Sit to stand: Independent      Sit to stand: Independent    Ambulation    2 x 75 feet using  wheeled walker with Minimal assist of 1   for walker control, walker approximation, balance and safety 250 feet using  no device with Supervision    cues for safety, pacing and balance    > 200 feet using  least restrictive device versus no device with Independent    Stair negotiation: ascended and descended   Not assessed  5 steps x 2  hand rails needing supervision.       4 steps with 2 HR with Mod I   ROM Within functional limits    Increase range of motion 10% of affected joints    Strength BUE:  refer to OT eval  RLE:  4/5  LLE:  4/5  Increase strength in affected mm groups by 1/3 grade   Balance Sitting EOB:  good -  Dynamic Standing:  fair  Sitting EOB: not assessed   Dynamic Standing: fair+     Sitting EOB:  good   Dynamic Standing: fair +     Patient is Alert & Oriented x person, place, time and situation and follows directions    Sensation:  Patient  denies numbness/tingling   Edema:  no   Endurance: fair +    Vitals: room air   Blood Pressure at rest  Blood Pressure during session    Heart Rate at rest  Heart Rate during session    SPO2 at rest %  SPO2 during session %     Patient education  Patient educated on role of Physical Therapy, risks of immobility, safety and plan of care, energy conservation,  importance of mobility while in hospital , importance and purpose of adaptive device and adjusted to proper height for the patient. , safety , stair training , proper use/technique of incentive spirometer and seated exercises     Patient response to education:   Pt verbalized understanding Pt demonstrated skill Pt requires further education in this area   Yes Partial Yes      Treatment:  Patient practiced and was instructed/facilitated in the following treatment: Patient in chair. Patient stood and Ambulated into hallway, performed stairs as in chart and returned to room to perform seated exercise and standing balance challenges. .     Therapeutic Exercises:  Tandem stance, push/ pull with hand resistance  x 10 reps per arm and 30 second stands. At end of session, patient in chair with   call light and phone within reach,  all lines and tubes intact, nursing notified. Patient would benefit from continued skilled Physical Therapy to improve functional independence and quality of life.          Patient's/ family goals   get stronger    Time in  1537  Time out  1547    Total Treatment Time  10 minutes    CPT codes:  Therapeutic activities (00800)   10 minutes  1 unit(s)    [place]   [facility] LLR#[id number]

## 2022-02-12 VITALS
RESPIRATION RATE: 18 BRPM | BODY MASS INDEX: 31.15 KG/M2 | WEIGHT: 230 LBS | HEART RATE: 65 BPM | HEIGHT: 72 IN | TEMPERATURE: 97 F | SYSTOLIC BLOOD PRESSURE: 110 MMHG | OXYGEN SATURATION: 98 % | DIASTOLIC BLOOD PRESSURE: 70 MMHG

## 2022-02-12 LAB
BASOPHILS ABSOLUTE: 0 E9/L (ref 0–0.2)
BASOPHILS RELATIVE PERCENT: 0.3 % (ref 0–2)
EOSINOPHILS ABSOLUTE: 0.15 E9/L (ref 0.05–0.5)
EOSINOPHILS RELATIVE PERCENT: 2.6 % (ref 0–6)
HCT VFR BLD CALC: 50.5 % (ref 37–54)
HEMOGLOBIN: 16.6 G/DL (ref 12.5–16.5)
INR BLD: 2.9
LYMPHOCYTES ABSOLUTE: 0.53 E9/L (ref 1.5–4)
LYMPHOCYTES RELATIVE PERCENT: 8.7 % (ref 20–42)
MCH RBC QN AUTO: 31.4 PG (ref 26–35)
MCHC RBC AUTO-ENTMCNC: 32.9 % (ref 32–34.5)
MCV RBC AUTO: 95.6 FL (ref 80–99.9)
MONOCYTES ABSOLUTE: 0.83 E9/L (ref 0.1–0.95)
MONOCYTES RELATIVE PERCENT: 13.9 % (ref 2–12)
NEUTROPHILS ABSOLUTE: 4.43 E9/L (ref 1.8–7.3)
NEUTROPHILS RELATIVE PERCENT: 74.8 % (ref 43–80)
PDW BLD-RTO: 14 FL (ref 11.5–15)
PLATELET # BLD: 94 E9/L (ref 130–450)
PLATELET CONFIRMATION: NORMAL
PMV BLD AUTO: 11.7 FL (ref 7–12)
PROTHROMBIN TIME: 34 SEC (ref 9.3–12.4)
RBC # BLD: 5.28 E12/L (ref 3.8–5.8)
WBC # BLD: 5.9 E9/L (ref 4.5–11.5)

## 2022-02-12 PROCEDURE — 85025 COMPLETE CBC W/AUTO DIFF WBC: CPT

## 2022-02-12 PROCEDURE — 99239 HOSP IP/OBS DSCHRG MGMT >30: CPT | Performed by: INTERNAL MEDICINE

## 2022-02-12 PROCEDURE — 2580000003 HC RX 258: Performed by: FAMILY MEDICINE

## 2022-02-12 PROCEDURE — 6360000002 HC RX W HCPCS: Performed by: INTERNAL MEDICINE

## 2022-02-12 PROCEDURE — 85610 PROTHROMBIN TIME: CPT

## 2022-02-12 PROCEDURE — 36415 COLL VENOUS BLD VENIPUNCTURE: CPT

## 2022-02-12 PROCEDURE — 6370000000 HC RX 637 (ALT 250 FOR IP): Performed by: FAMILY MEDICINE

## 2022-02-12 RX ORDER — WARFARIN SODIUM 5 MG/1
5 TABLET ORAL DAILY
Qty: 30 TABLET | Refills: 3 | Status: ON HOLD | OUTPATIENT
Start: 2022-02-12 | End: 2022-07-19 | Stop reason: SDUPTHER

## 2022-02-12 RX ADMIN — METHIMAZOLE 7.5 MG: 5 TABLET ORAL at 09:21

## 2022-02-12 RX ADMIN — METOPROLOL SUCCINATE 100 MG: 25 TABLET, FILM COATED, EXTENDED RELEASE ORAL at 09:20

## 2022-02-12 RX ADMIN — Medication 5 ML: at 09:55

## 2022-02-12 RX ADMIN — LEVETIRACETAM 500 MG: 500 TABLET, FILM COATED ORAL at 09:20

## 2022-02-12 RX ADMIN — HEPARIN SODIUM 12.18 UNITS/KG/HR: 10000 INJECTION, SOLUTION INTRAVENOUS at 00:25

## 2022-02-12 RX ADMIN — Medication 2000 UNITS: at 09:20

## 2022-02-12 RX ADMIN — ALLOPURINOL 300 MG: 100 TABLET ORAL at 09:20

## 2022-02-12 ASSESSMENT — PAIN SCALES - GENERAL
PAINLEVEL_OUTOF10: 0
PAINLEVEL_OUTOF10: 0

## 2022-02-12 NOTE — DISCHARGE SUMMARY
Physician Discharge Summary     Patient ID:  Ashely Estrada  [id number]  68 y.o.  1945    Admit date: 2/5/2022    Discharge date and time: No discharge date for patient encounter. Admitting Physician: Colton Marin MD     [facility]    Admission Diagnoses: Acute deep vein thrombosis (DVT) of femoral vein of left lower extremity ([facility] [address] [I82.412]    Discharge Diagnoses:     1. Acute DVT of the left lower extremity  2. Hyperthyroidism  3. Hx of ICH  4. Chronic Kidney disease stage 3  5. Hypertension Essential  6. Chronic Cough,  7. Vit D Def  8. Thrombocytopenia  9. CAD. Admission Condition: fair    Discharged Condition: good    Indication for Admission:     Hospital Course:   Mr. Samanta Rudd was admitted with leg swelling. Work up showed DVT. Concern for hx of ICH lead to placement of IVC. However, Neurosurgery was consulted and they recommended patient to be anticoagulated. Patient was prescribed Eliquis, but unable to afford it. He was started on Heparin drip and coumadin. INR was therapeutic at 2 consecutive days and patient was discharge in stable condition. He had mild elevation in creatinine and was given 500 ml of NS and repeat creatinine returned to baseline. He reported cough. CXR was unremarkable, BP was noted to be well controlled. He has no systemic sign to suggest infection. Concern is that this could be either remnant of covid infection versus lisinopril side effect. Lisinopril were held and BP remains well controlled. He is to follow up with his PCP for further evaluation and monitoring of INR. Time spent in discharge of patient is greater than 30 minutes.       Consults: vascular surgery and Neurosurgery    Significant Diagnostic Studies: labs:     Treatments: IV hydration and anticoagulation: heparin and warfarin    Discharge Exam:  /70   Pulse 65   Temp 97 °F (36.1 °C) (Oral)   Resp 18   Ht 6' (1.829 m)   Wt 230 lb (104.3 kg)   SpO2 98% BMI 31.19 kg/m²   General appearance: alert, appears stated age and cooperative  Head: Normocephalic, without obvious abnormality, atraumatic  Eyes: conjunctivae/corneas clear. PERRL, EOM's intact. Fundi benign. Ears: normal TM's and external ear canals both ears  Nose: Nares normal. Septum midline. Mucosa normal. No drainage or sinus tenderness. Throat: lips, mucosa, and tongue normal; teeth and gums normal  Neck: no adenopathy, no carotid bruit, no JVD, supple, symmetrical, trachea midline and thyroid not enlarged, symmetric, no tenderness/mass/nodules  Lungs: clear to auscultation bilaterally  Heart: regular rate and rhythm, S1, S2 normal, no murmur, click, rub or gallop  Abdomen: soft, non-tender; bowel sounds normal; no masses,  no organomegaly  Rectal: normal tone, normal prostate, no masses or tenderness  Extremities: extremities normal, atraumatic, no cyanosis or edema  Pulses: 2+ and symmetric    Disposition: home    In process/preliminary results:  Outstanding Order Results     No orders found from 1/7/2022 to 2/6/2022. Patient Instructions:   Current Discharge Medication List      START taking these medications    Details   warfarin (COUMADIN) 5 MG tablet Take 1 tablet by mouth daily Take 2 mg SAT, SUN, TUE, THURS    Take 3mg MON, WED, FRI    INR weekly with PCP and Adjust as needed. Qty: 30 tablet, Refills: 3         CONTINUE these medications which have NOT CHANGED    Details   furosemide (LASIX) 20 MG tablet TAKE ONE TABLET BY MOUTH DAILY      metoprolol succinate (TOPROL XL) 100 MG extended release tablet Take 1 tablet by mouth daily  Qty: 90 tablet, Refills: 3      vitamin B-6 (B-6) 50 MG tablet Take 1 tablet by mouth daily  Qty: 30 tablet, Refills: 0      Vitamin D (CHOLECALCIFEROL) 50 MCG (2000 UT) TABS tablet Take 1 tablet by mouth daily  Qty: 60 tablet, Refills: 0    Comments: Labeling may look different. 25 mcg=1000 Units. Please double check dosages.       zinc sulfate (ZINCATE) 220 (50 Zn) MG capsule Take 1 capsule by mouth daily  Qty: 30 capsule, Refills: 3      ascorbic acid (VITAMIN C) 500 MG tablet Take 1 tablet by mouth 2 times daily  Qty: 30 tablet, Refills: 0      levETIRAcetam (KEPPRA) 500 MG tablet Take 1 tablet by mouth 2 times daily  Qty: 60 tablet, Refills: 5      methIMAzole (TAPAZOLE) 5 MG tablet Take 1.5 tablets by mouth daily 1.5 tabs  Qty: 135 tablet, Refills: 3    Associated Diagnoses: Hyperthyroidism      colchicine-probenecid 0.5-500 MG per tablet TAKE ONE TABLET BY MOUTH EVERY DAY      nitroGLYCERIN (NITROSTAT) 0.4 MG SL tablet Place 1 tablet under the tongue every 5 minutes as needed for Chest pain. Qty: 25 tablet, Refills: 3      allopurinol (ZYLOPRIM) 300 MG tablet Take 300 mg by mouth daily. STOP taking these medications       lisinopril (PRINIVIL;ZESTRIL) 20 MG tablet Comments:   Reason for Stopping:         hydrochlorothiazide (MICROZIDE) 12.5 MG capsule Comments:   Reason for Stopping:             Activity: activity as tolerated  Diet: regular diet  Wound Care: none needed    Follow-up with PCP in 4 days.     SignedChandana Menezesungwu  2/12/2022  9:40 AM

## 2022-03-22 ENCOUNTER — TELEPHONE (OUTPATIENT)
Dept: CARDIOLOGY CLINIC | Age: 77
End: 2022-03-22

## 2022-03-22 NOTE — TELEPHONE ENCOUNTER
[facility] called in stating they applied a 24 hour holter monitor that showed bradycardia during daytime hours    He is taking Metoprolol 100mg daily and they are asking if it would be ok to decrease it to 50mg daily    [address] NO  Fax [phone]

## 2022-03-23 NOTE — TELEPHONE ENCOUNTER
The monitor is entirely artifact with no significant heart rates of concerns recommend continuation of existing medical therapy unless significant symptoms at which time would recommend follow-up with [facility] to assess

## 2022-07-17 ENCOUNTER — HOSPITAL ENCOUNTER (OUTPATIENT)
Age: 77
Setting detail: OBSERVATION
Discharge: HOME | End: 2022-07-19
Attending: EMERGENCY MEDICINE | Admitting: INTERNAL MEDICINE
Payer: MEDICARE

## 2022-07-17 ENCOUNTER — APPOINTMENT (OUTPATIENT)
Dept: GENERAL RADIOLOGY | Age: 77
End: 2022-07-17
Payer: MEDICARE

## 2022-07-17 ENCOUNTER — APPOINTMENT (OUTPATIENT)
Dept: CT IMAGING | Age: 77
End: 2022-07-17
Payer: MEDICARE

## 2022-07-17 DIAGNOSIS — R77.8 ELEVATED TROPONIN: ICD-10-CM

## 2022-07-17 DIAGNOSIS — R09.81 CONGESTION OF NASAL SINUS: Primary | ICD-10-CM

## 2022-07-17 PROBLEM — R07.89 CHEST TIGHTNESS: Status: ACTIVE | Noted: 2022-07-17

## 2022-07-17 PROBLEM — R79.89 ELEVATED TROPONIN: Status: ACTIVE | Noted: 2022-07-17

## 2022-07-17 LAB
ALBUMIN SERPL-MCNC: 3.8 G/DL (ref 3.5–5.2)
ALP BLD-CCNC: 112 U/L (ref 40–129)
ALT SERPL-CCNC: 37 U/L (ref 0–40)
ANION GAP SERPL CALCULATED.3IONS-SCNC: 8 MMOL/L (ref 7–16)
AST SERPL-CCNC: 41 U/L (ref 0–39)
BASOPHILS ABSOLUTE: 0 E9/L (ref 0–0.2)
BASOPHILS RELATIVE PERCENT: 0.2 % (ref 0–2)
BILIRUB SERPL-MCNC: 0.8 MG/DL (ref 0–1.2)
BUN BLDV-MCNC: 23 MG/DL (ref 6–23)
CALCIUM SERPL-MCNC: 9.4 MG/DL (ref 8.6–10.2)
CHLORIDE BLD-SCNC: 106 MMOL/L (ref 98–107)
CO2: 28 MMOL/L (ref 22–29)
CREAT SERPL-MCNC: 1.7 MG/DL (ref 0.7–1.2)
EOSINOPHILS ABSOLUTE: 0.4 E9/L (ref 0.05–0.5)
EOSINOPHILS RELATIVE PERCENT: 9.4 % (ref 0–6)
GFR AFRICAN AMERICAN: 48
GFR NON-AFRICAN AMERICAN: 39 ML/MIN/1.73
GLUCOSE BLD-MCNC: 80 MG/DL (ref 74–99)
HCT VFR BLD CALC: 53.6 % (ref 37–54)
HEMOGLOBIN: 17.4 G/DL (ref 12.5–16.5)
INR BLD: 2
LYMPHOCYTES ABSOLUTE: 0.6 E9/L (ref 1.5–4)
LYMPHOCYTES RELATIVE PERCENT: 13.7 % (ref 20–42)
MCH RBC QN AUTO: 32.2 PG (ref 26–35)
MCHC RBC AUTO-ENTMCNC: 32.5 % (ref 32–34.5)
MCV RBC AUTO: 99.3 FL (ref 80–99.9)
METAMYELOCYTES RELATIVE PERCENT: 0.9 % (ref 0–1)
MONOCYTES ABSOLUTE: 0.26 E9/L (ref 0.1–0.95)
MONOCYTES RELATIVE PERCENT: 6 % (ref 2–12)
NEUTROPHILS ABSOLUTE: 3.05 E9/L (ref 1.8–7.3)
NEUTROPHILS RELATIVE PERCENT: 70.1 % (ref 43–80)
OVALOCYTES: ABNORMAL
PDW BLD-RTO: 14.2 FL (ref 11.5–15)
PLATELET # BLD: 81 E9/L (ref 130–450)
PLATELET CONFIRMATION: NORMAL
PMV BLD AUTO: 11.4 FL (ref 7–12)
POIKILOCYTES: ABNORMAL
POLYCHROMASIA: ABNORMAL
POTASSIUM REFLEX MAGNESIUM: 4.5 MMOL/L (ref 3.5–5)
PROTHROMBIN TIME: 23.6 SEC (ref 9.3–12.4)
RBC # BLD: 5.4 E12/L (ref 3.8–5.8)
REASON FOR REJECTION: NORMAL
REJECTED TEST: NORMAL
SARS-COV-2, NAAT: NOT DETECTED
SODIUM BLD-SCNC: 142 MMOL/L (ref 132–146)
TOTAL PROTEIN: 6.5 G/DL (ref 6.4–8.3)
TROPONIN, HIGH SENSITIVITY: 74 NG/L (ref 0–11)
TROPONIN, HIGH SENSITIVITY: 76 NG/L (ref 0–11)
TROPONIN, HIGH SENSITIVITY: 83 NG/L (ref 0–11)
WBC # BLD: 4.3 E9/L (ref 4.5–11.5)

## 2022-07-17 PROCEDURE — G0378 HOSPITAL OBSERVATION PER HR: HCPCS

## 2022-07-17 PROCEDURE — 70450 CT HEAD/BRAIN W/O DYE: CPT

## 2022-07-17 PROCEDURE — 99285 EMERGENCY DEPT VISIT HI MDM: CPT

## 2022-07-17 PROCEDURE — 87635 SARS-COV-2 COVID-19 AMP PRB: CPT

## 2022-07-17 PROCEDURE — 80053 COMPREHEN METABOLIC PANEL: CPT

## 2022-07-17 PROCEDURE — 99220 PR INITIAL OBSERVATION CARE/DAY 70 MINUTES: CPT | Performed by: INTERNAL MEDICINE

## 2022-07-17 PROCEDURE — 36415 COLL VENOUS BLD VENIPUNCTURE: CPT

## 2022-07-17 PROCEDURE — 85025 COMPLETE CBC W/AUTO DIFF WBC: CPT

## 2022-07-17 PROCEDURE — 85610 PROTHROMBIN TIME: CPT

## 2022-07-17 PROCEDURE — 71046 X-RAY EXAM CHEST 2 VIEWS: CPT

## 2022-07-17 PROCEDURE — 84484 ASSAY OF TROPONIN QUANT: CPT

## 2022-07-17 PROCEDURE — 93005 ELECTROCARDIOGRAM TRACING: CPT | Performed by: EMERGENCY MEDICINE

## 2022-07-17 RX ORDER — ACETAMINOPHEN 325 MG/1
650 TABLET ORAL EVERY 6 HOURS PRN
Status: DISCONTINUED | OUTPATIENT
Start: 2022-07-17 | End: 2022-07-19 | Stop reason: HOSPADM

## 2022-07-17 RX ORDER — ACETAMINOPHEN 650 MG/1
650 SUPPOSITORY RECTAL EVERY 6 HOURS PRN
Status: DISCONTINUED | OUTPATIENT
Start: 2022-07-17 | End: 2022-07-19 | Stop reason: HOSPADM

## 2022-07-17 RX ORDER — SODIUM CHLORIDE 0.9 % (FLUSH) 0.9 %
10 SYRINGE (ML) INJECTION EVERY 12 HOURS SCHEDULED
Status: DISCONTINUED | OUTPATIENT
Start: 2022-07-18 | End: 2022-07-19 | Stop reason: HOSPADM

## 2022-07-17 RX ORDER — SODIUM CHLORIDE 0.9 % (FLUSH) 0.9 %
10 SYRINGE (ML) INJECTION PRN
Status: DISCONTINUED | OUTPATIENT
Start: 2022-07-17 | End: 2022-07-19 | Stop reason: HOSPADM

## 2022-07-17 RX ORDER — PROMETHAZINE HYDROCHLORIDE 25 MG/1
12.5 TABLET ORAL EVERY 6 HOURS PRN
Status: DISCONTINUED | OUTPATIENT
Start: 2022-07-17 | End: 2022-07-19 | Stop reason: HOSPADM

## 2022-07-17 RX ORDER — ONDANSETRON 2 MG/ML
4 INJECTION INTRAMUSCULAR; INTRAVENOUS EVERY 6 HOURS PRN
Status: DISCONTINUED | OUTPATIENT
Start: 2022-07-17 | End: 2022-07-19 | Stop reason: HOSPADM

## 2022-07-17 RX ORDER — POLYETHYLENE GLYCOL 3350 17 G/17G
17 POWDER, FOR SOLUTION ORAL DAILY PRN
Status: DISCONTINUED | OUTPATIENT
Start: 2022-07-17 | End: 2022-07-19 | Stop reason: HOSPADM

## 2022-07-17 RX ORDER — SODIUM CHLORIDE 9 MG/ML
INJECTION, SOLUTION INTRAVENOUS PRN
Status: DISCONTINUED | OUTPATIENT
Start: 2022-07-17 | End: 2022-07-19 | Stop reason: HOSPADM

## 2022-07-17 ASSESSMENT — ENCOUNTER SYMPTOMS
COUGH: 0
ABDOMINAL PAIN: 0
BACK PAIN: 0
SHORTNESS OF BREATH: 0

## 2022-07-17 ASSESSMENT — PAIN - FUNCTIONAL ASSESSMENT
PAIN_FUNCTIONAL_ASSESSMENT: NONE - DENIES PAIN
PAIN_FUNCTIONAL_ASSESSMENT: NONE - DENIES PAIN

## 2022-07-17 NOTE — ED PROVIDER NOTES
This is a 68year old male with a PMH of PE and CAD who presents to the ED for evlauation of fatigue. Patient states taht today he was sitting outside when he suddently had an episode where he felt as though he was getting nasal congestion, brief double vision and extreme fatigue. He states that he has no chest pain or shortness of breath. Patient states that he then went and slept for about 5 hours which is uncommon for him. Patient states that he woke up here into the ER for further evaluation. States he has been compliant with his medications. States that he is on Coumadin for PEs. He has no speech difficulty or facial droop. No reported traumas today although he states he did bump his head about 2 days ago. No abdominal pain or nausea    The history is provided by the patient. No  was used. Review of Systems   Constitutional:  Positive for fatigue. Negative for fever. HENT:  Positive for congestion. Eyes:  Positive for visual disturbance. Respiratory:  Negative for cough and shortness of breath. Cardiovascular:  Negative for chest pain. Gastrointestinal:  Negative for abdominal pain. Endocrine: Negative for polyuria. Genitourinary:  Negative for dysuria. Musculoskeletal:  Negative for back pain. Skin:  Negative for rash. Allergic/Immunologic: Negative for immunocompromised state. Neurological:  Negative for headaches. Hematological:  Bruises/bleeds easily. Psychiatric/Behavioral:  Negative for confusion. Physical Exam  Vitals and nursing note reviewed. Constitutional:       General: He is not in acute distress. Appearance: He is well-developed. HENT:      Head: Normocephalic and atraumatic. Mouth/Throat:      Mouth: Mucous membranes are moist.   Eyes:      Extraocular Movements: Extraocular movements intact. Pupils: Pupils are equal, round, and reactive to light. Neck:      Vascular: No JVD.    Cardiovascular:      Rate and Rhythm: Normal rate. Pulmonary:      Effort: Pulmonary effort is normal.      Breath sounds: No wheezing or rhonchi. Abdominal:      General: There is no distension. Palpations: Abdomen is soft. Tenderness: There is no abdominal tenderness. There is no guarding or rebound. Hernia: No hernia is present. Musculoskeletal:      Cervical back: Normal range of motion and neck supple. Right lower leg: No edema. Left lower leg: No edema. Skin:     General: Skin is warm and dry. Capillary Refill: Capillary refill takes less than 2 seconds. Neurological:      General: No focal deficit present. Mental Status: He is alert and oriented to person, place, and time. Cranial Nerves: No cranial nerve deficit. Psychiatric:         Mood and Affect: Mood normal.         Behavior: Behavior normal.        Procedures     MDM  Number of Diagnoses or Management Options  Congestion of nasal sinus  Elevated troponin  Diagnosis management comments: Patient is a pleasant 68-year-old male who presented to the ED for evaluation of an episode of chest tightness, doubel vision and fatigue, He had no new chest pain or shortness of breath. Patient had a broad differential including COVID-pneumonia, ACS as well as incranial bleed to name a few of the many. CT head was reassuring. Troponin was significantly elevated from previous with no new ekg changes.  He was admitted for further evaluation and spoke with Dr. Cecilia Connor who agreed                         --------------------------------------------- PAST HISTORY ---------------------------------------------  Past Medical History:  has a past medical history of Bilateral renal cysts, CAD (coronary artery disease), Cardiomyopathy ([facility] [address] Cerebral artery occlusion with cerebral infarction [facility]), Chest pain, [facility] History of gout, History of pulmonary embolism, Hyperlipidemia, Hypertension, Hyperthyroidism, NSTEMI (non-ST elevated myocardial infarction) ([facility] [address] Obesity due to excess calories, Thyroid cyst, and Thyroid disease. Past Surgical History:  has a past surgical history that includes back surgery; Coronary angioplasty with stent (10-); Diagnostic Cardiac Cath Lab Procedure (10/13/2014); craniotomy (Right, 6/25/2021); and IR GUIDED IVC FILTER PLACEMENT (2/6/2022). Social History:  reports that he has never smoked. He has never used smokeless tobacco. He reports current alcohol use. He reports that he does not use drugs. Family History: family history includes Heart Disease in his father and mother. The patients home medications have been reviewed. Allergies: Patient has no known allergies.     -------------------------------------------------- RESULTS -------------------------------------------------    LABS:  Results for orders placed or performed during the hospital encounter of 07/17/22   COVID-19, Rapid    Specimen: Nasopharyngeal Swab   Result Value Ref Range    SARS-CoV-2, NAAT Not Detected Not Detected   Comprehensive Metabolic Panel w/ Reflex to MG   Result Value Ref Range    Sodium 142 132 - 146 mmol/L    Potassium reflex Magnesium 4.5 3.5 - 5.0 mmol/L    Chloride 106 98 - 107 mmol/L    CO2 28 22 - 29 mmol/L    Anion Gap 8 7 - 16 mmol/L    Glucose 80 74 - 99 mg/dL    BUN 23 6 - 23 mg/dL    CREATININE 1.7 (H) 0.7 - 1.2 mg/dL    GFR Non-African American 39 >=60 mL/min/1.73    GFR African American 48     Calcium 9.4 8.6 - 10.2 mg/dL    Total Protein 6.5 6.4 - 8.3 g/dL    Albumin 3.8 3.5 - 5.2 g/dL    Total Bilirubin 0.8 0.0 - 1.2 mg/dL    Alkaline Phosphatase 112 40 - 129 U/L    ALT 37 0 - 40 U/L    AST 41 (H) 0 - 39 U/L   CBC with Auto Differential   Result Value Ref Range    WBC 4.3 (L) 4.5 - 11.5 E9/L    RBC 5.40 3.80 - 5.80 E12/L    Hemoglobin 17.4 (H) 12.5 - 16.5 g/dL    Hematocrit 53.6 37.0 - 54.0 %    MCV 99.3 80.0 - 99.9 fL    MCH 32.2 26.0 - 35.0 pg    MCHC 32.5 32.0 - 34.5 %    RDW 14.2 11.5 - 15.0 fL    Platelets 81 (L) 521 - 450 E9/L    MPV 11.4 7.0 - 12.0 fL    Neutrophils % 70.1 43.0 - 80.0 %    Lymphocytes % 13.7 (L) 20.0 - 42.0 %    Monocytes % 6.0 2.0 - 12.0 %    Eosinophils % 9.4 (H) 0.0 - 6.0 %    Basophils % 0.2 0.0 - 2.0 %    Neutrophils Absolute 3.05 1.80 - 7.30 E9/L    Lymphocytes Absolute 0.60 (L) 1.50 - 4.00 E9/L    Monocytes Absolute 0.26 0.10 - 0.95 E9/L    Eosinophils Absolute 0.40 0.05 - 0.50 E9/L    Basophils Absolute 0.00 0.00 - 0.20 E9/L    Metamyelocytes Relative 0.9 0.0 - 1.0 %    Polychromasia 1+     Poikilocytes 1+     Ovalocytes 1+    Troponin   Result Value Ref Range    Troponin, High Sensitivity 83 (H) 0 - 11 ng/L   Platelet Confirmation   Result Value Ref Range    Platelet Confirmation CONFIRMED    Troponin   Result Value Ref Range    Troponin, High Sensitivity 76 (H) 0 - 11 ng/L   SPECIMEN REJECTION   Result Value Ref Range    Rejected Test PT     Reason for Rejection see below    Protime-INR   Result Value Ref Range    Protime 23.6 (H) 9.3 - 12.4 sec    INR 2.0    Troponin   Result Value Ref Range    Troponin, High Sensitivity 74 (H) 0 - 11 ng/L       RADIOLOGY:  CT HEAD WO CONTRAST   Final Result   1. No acute intracranial hemorrhage or edema. 2. Stable prominence of CSF spaces overlying frontal lobes   3. Stable incidental arachnoid cyst in left middle cranial fossa. XR CHEST (2 VW)   Final Result   No pneumonia or pleural effusion.                   ------------------------- NURSING NOTES AND VITALS REVIEWED ---------------------------  Date / Time Roomed:  7/17/2022  6:10 PM  ED Bed Assignment:  [id number]    The nursing notes within the ED encounter and vital signs as below have been reviewed.      Patient Vitals for the past 24 hrs:   BP Temp Temp src Pulse Resp SpO2 Height Weight   07/18/22 0045 (!) 162/79 97.5 °F (36.4 °C) Oral 59 16 98 % -- --   07/18/22 0034 (!) 162/79 -- -- 59 -- -- -- --   07/17/22 2355 (!) 174/84 -- -- 54 14 96 % -- --   07/17/22 1956 (!) 190/81 98.1 °F (36.7 °C) -- 56 16 97 % -- --   07/17/22 1729 (!) 165/76 97.5 °F (36.4 °C) -- 57 18 97 % 6' (1.829 m) 230 lb (104.3 kg)   07/17/22 1709 -- -- -- 57 18 99 % -- --     EKG: This EKG is signed and interpreted by me. Rate: 56  Rhythm: Sinus  Interpretation: 1st degree AV block sinus josey, no st elevation, no t wave inversions, no st depressions  Comparison: was normal   Oxygen Saturation Interpretation: Normal    ------------------------------------------ PROGRESS NOTES ------------------------------------------  Re-evaluation(s):  Time: 1112  Patients symptoms show no change  Repeat physical examination is not changed    Counseling:  I have spoken with the patient and discussed todays results, in addition to providing specific details for the plan of care and counseling regarding the diagnosis and prognosis. Their questions are answered at this time and they are agreeable with the plan of admission.    --------------------------------- ADDITIONAL PROVIDER NOTES ---------------------------------  Consultations:  Spoke with Dr. Bethany Gomes Discussed case. They will admit the patient. This patient's ED course included: a personal history and physicial examination, re-evaluation prior to disposition, multiple bedside re-evaluations, IV medications, cardiac monitoring, continuous pulse oximetry, and complex medical decision making and emergency management    This patient has remained hemodynamically stable during their ED course. Diagnosis:  1. Congestion of nasal sinus    2. Elevated troponin        Disposition:  Patient's disposition: Admit to telemetry  Patient's condition is stable. Patient's condition is stable.        Arti Castorena DO  07/18/22 [id number]

## 2022-07-17 NOTE — Clinical Note
Discharge Plan[de-identified] Other/[facility])   Telemetry/Cardiac Monitoring Required?: Yes

## 2022-07-18 ENCOUNTER — APPOINTMENT (OUTPATIENT)
Dept: NUCLEAR MEDICINE | Age: 77
End: 2022-07-18
Payer: MEDICARE

## 2022-07-18 ENCOUNTER — APPOINTMENT (OUTPATIENT)
Dept: NON INVASIVE DIAGNOSTICS | Age: 77
End: 2022-07-18
Payer: MEDICARE

## 2022-07-18 PROBLEM — R09.81 CONGESTION OF NASAL SINUS: Status: ACTIVE | Noted: 2022-07-18

## 2022-07-18 LAB
ALBUMIN SERPL-MCNC: 3.6 G/DL (ref 3.5–5.2)
ALBUMIN SERPL-MCNC: 3.8 G/DL (ref 3.5–5.2)
ALP BLD-CCNC: 104 U/L (ref 40–129)
ALP BLD-CCNC: 106 U/L (ref 40–129)
ALT SERPL-CCNC: 34 U/L (ref 0–40)
ALT SERPL-CCNC: 36 U/L (ref 0–40)
ANION GAP SERPL CALCULATED.3IONS-SCNC: 10 MMOL/L (ref 7–16)
ANION GAP SERPL CALCULATED.3IONS-SCNC: 9 MMOL/L (ref 7–16)
AST SERPL-CCNC: 36 U/L (ref 0–39)
AST SERPL-CCNC: 38 U/L (ref 0–39)
BASOPHILS ABSOLUTE: 0 E9/L (ref 0–0.2)
BASOPHILS RELATIVE PERCENT: 0.5 % (ref 0–2)
BILIRUB SERPL-MCNC: 0.9 MG/DL (ref 0–1.2)
BILIRUB SERPL-MCNC: 1 MG/DL (ref 0–1.2)
BUN BLDV-MCNC: 20 MG/DL (ref 6–23)
BUN BLDV-MCNC: 21 MG/DL (ref 6–23)
BURR CELLS: ABNORMAL
CALCIUM SERPL-MCNC: 9.1 MG/DL (ref 8.6–10.2)
CALCIUM SERPL-MCNC: 9.4 MG/DL (ref 8.6–10.2)
CHLORIDE BLD-SCNC: 104 MMOL/L (ref 98–107)
CHLORIDE BLD-SCNC: 105 MMOL/L (ref 98–107)
CHOLESTEROL, FASTING: 192 MG/DL (ref 0–199)
CO2: 25 MMOL/L (ref 22–29)
CO2: 26 MMOL/L (ref 22–29)
CREAT SERPL-MCNC: 1.3 MG/DL (ref 0.7–1.2)
CREAT SERPL-MCNC: 1.4 MG/DL (ref 0.7–1.2)
EKG ATRIAL RATE: 56 BPM
EKG P-R INTERVAL: 344 MS
EKG Q-T INTERVAL: 436 MS
EKG QRS DURATION: 100 MS
EKG QTC CALCULATION (BAZETT): 420 MS
EKG R AXIS: -12 DEGREES
EKG T AXIS: 41 DEGREES
EKG VENTRICULAR RATE: 56 BPM
EOSINOPHILS ABSOLUTE: 0.08 E9/L (ref 0.05–0.5)
EOSINOPHILS RELATIVE PERCENT: 1.8 % (ref 0–6)
GFR AFRICAN AMERICAN: 59
GFR AFRICAN AMERICAN: >60
GFR NON-AFRICAN AMERICAN: 49 ML/MIN/1.73
GFR NON-AFRICAN AMERICAN: 54 ML/MIN/1.73
GLUCOSE BLD-MCNC: 110 MG/DL (ref 74–99)
GLUCOSE BLD-MCNC: 98 MG/DL (ref 74–99)
HBA1C MFR BLD: 5.2 % (ref 4–5.6)
HCT VFR BLD CALC: 54.3 % (ref 37–54)
HDLC SERPL-MCNC: 51 MG/DL
HEMOGLOBIN: 17.4 G/DL (ref 12.5–16.5)
INR BLD: 1.9
LDL CHOLESTEROL CALCULATED: 107 MG/DL (ref 0–99)
LV EF: 68 %
LVEF MODALITY: NORMAL
LYMPHOCYTES ABSOLUTE: 0.48 E9/L (ref 1.5–4)
LYMPHOCYTES RELATIVE PERCENT: 10.6 % (ref 20–42)
MCH RBC QN AUTO: 31.8 PG (ref 26–35)
MCHC RBC AUTO-ENTMCNC: 32 % (ref 32–34.5)
MCV RBC AUTO: 99.1 FL (ref 80–99.9)
MONOCYTES ABSOLUTE: 0.4 E9/L (ref 0.1–0.95)
MONOCYTES RELATIVE PERCENT: 8.8 % (ref 2–12)
NEUTROPHILS ABSOLUTE: 3.48 E9/L (ref 1.8–7.3)
NEUTROPHILS RELATIVE PERCENT: 78.8 % (ref 43–80)
PDW BLD-RTO: 14.2 FL (ref 11.5–15)
PLATELET # BLD: 70 E9/L (ref 130–450)
PLATELET CONFIRMATION: NORMAL
PMV BLD AUTO: 11.8 FL (ref 7–12)
POIKILOCYTES: ABNORMAL
POTASSIUM REFLEX MAGNESIUM: 4 MMOL/L (ref 3.5–5)
POTASSIUM REFLEX MAGNESIUM: 4.1 MMOL/L (ref 3.5–5)
PRO-BNP: 235 PG/ML (ref 0–450)
PROTHROMBIN TIME: 21.5 SEC (ref 9.3–12.4)
RBC # BLD: 5.48 E12/L (ref 3.8–5.8)
SODIUM BLD-SCNC: 139 MMOL/L (ref 132–146)
SODIUM BLD-SCNC: 140 MMOL/L (ref 132–146)
T4 FREE: 0.69 NG/DL (ref 0.93–1.7)
TOTAL CK: 96 U/L (ref 20–200)
TOTAL PROTEIN: 6.1 G/DL (ref 6.4–8.3)
TOTAL PROTEIN: 6.1 G/DL (ref 6.4–8.3)
TRIGLYCERIDE, FASTING: 172 MG/DL (ref 0–149)
TSH SERPL DL<=0.05 MIU/L-ACNC: 1.73 UIU/ML (ref 0.27–4.2)
VLDLC SERPL CALC-MCNC: 34 MG/DL
WBC # BLD: 4.4 E9/L (ref 4.5–11.5)

## 2022-07-18 PROCEDURE — G0378 HOSPITAL OBSERVATION PER HR: HCPCS

## 2022-07-18 PROCEDURE — 6370000000 HC RX 637 (ALT 250 FOR IP): Performed by: INTERNAL MEDICINE

## 2022-07-18 PROCEDURE — 80061 LIPID PANEL: CPT

## 2022-07-18 PROCEDURE — 6360000002 HC RX W HCPCS: Performed by: INTERNAL MEDICINE

## 2022-07-18 PROCEDURE — 3430000000 HC RX DIAGNOSTIC RADIOPHARMACEUTICAL: Performed by: RADIOLOGY

## 2022-07-18 PROCEDURE — 83036 HEMOGLOBIN GLYCOSYLATED A1C: CPT

## 2022-07-18 PROCEDURE — 84443 ASSAY THYROID STIM HORMONE: CPT

## 2022-07-18 PROCEDURE — 93018 CV STRESS TEST I&R ONLY: CPT | Performed by: INTERNAL MEDICINE

## 2022-07-18 PROCEDURE — 99214 OFFICE O/P EST MOD 30 MIN: CPT | Performed by: INTERNAL MEDICINE

## 2022-07-18 PROCEDURE — 93016 CV STRESS TEST SUPVJ ONLY: CPT | Performed by: INTERNAL MEDICINE

## 2022-07-18 PROCEDURE — 36415 COLL VENOUS BLD VENIPUNCTURE: CPT

## 2022-07-18 PROCEDURE — 78452 HT MUSCLE IMAGE SPECT MULT: CPT | Performed by: INTERNAL MEDICINE

## 2022-07-18 PROCEDURE — 83880 ASSAY OF NATRIURETIC PEPTIDE: CPT

## 2022-07-18 PROCEDURE — 80053 COMPREHEN METABOLIC PANEL: CPT

## 2022-07-18 PROCEDURE — 93017 CV STRESS TEST TRACING ONLY: CPT

## 2022-07-18 PROCEDURE — 84439 ASSAY OF FREE THYROXINE: CPT

## 2022-07-18 PROCEDURE — 85610 PROTHROMBIN TIME: CPT

## 2022-07-18 PROCEDURE — 82550 ASSAY OF CK (CPK): CPT

## 2022-07-18 PROCEDURE — 85025 COMPLETE CBC W/AUTO DIFF WBC: CPT

## 2022-07-18 PROCEDURE — 78452 HT MUSCLE IMAGE SPECT MULT: CPT

## 2022-07-18 PROCEDURE — APPSS180 APP SPLIT SHARED TIME > 60 MINUTES: Performed by: NURSE PRACTITIONER

## 2022-07-18 PROCEDURE — A9500 TC99M SESTAMIBI: HCPCS | Performed by: RADIOLOGY

## 2022-07-18 RX ORDER — ALLOPURINOL 300 MG/1
300 TABLET ORAL DAILY
Status: DISCONTINUED | OUTPATIENT
Start: 2022-07-18 | End: 2022-07-19 | Stop reason: HOSPADM

## 2022-07-18 RX ORDER — LEVETIRACETAM 500 MG/1
500 TABLET ORAL 2 TIMES DAILY
Status: DISCONTINUED | OUTPATIENT
Start: 2022-07-18 | End: 2022-07-19 | Stop reason: HOSPADM

## 2022-07-18 RX ORDER — LANOLIN ALCOHOL/MO/W.PET/CERES
50 CREAM (GRAM) TOPICAL DAILY
Status: DISCONTINUED | OUTPATIENT
Start: 2022-07-18 | End: 2022-07-19 | Stop reason: HOSPADM

## 2022-07-18 RX ORDER — METHIMAZOLE 5 MG/1
7.5 TABLET ORAL DAILY
Status: DISCONTINUED | OUTPATIENT
Start: 2022-07-18 | End: 2022-07-19 | Stop reason: HOSPADM

## 2022-07-18 RX ORDER — WARFARIN SODIUM 2 MG/1
2 TABLET ORAL DAILY
Status: DISCONTINUED | OUTPATIENT
Start: 2022-07-18 | End: 2022-07-19 | Stop reason: HOSPADM

## 2022-07-18 RX ORDER — MECOBALAMIN 5000 MCG
5 TABLET,DISINTEGRATING ORAL NIGHTLY PRN
Status: DISCONTINUED | OUTPATIENT
Start: 2022-07-18 | End: 2022-07-19 | Stop reason: HOSPADM

## 2022-07-18 RX ORDER — ASCORBIC ACID 500 MG
500 TABLET ORAL 2 TIMES DAILY
Status: DISCONTINUED | OUTPATIENT
Start: 2022-07-18 | End: 2022-07-19 | Stop reason: HOSPADM

## 2022-07-18 RX ORDER — FUROSEMIDE 20 MG/1
20 TABLET ORAL DAILY
Status: DISCONTINUED | OUTPATIENT
Start: 2022-07-18 | End: 2022-07-19 | Stop reason: HOSPADM

## 2022-07-18 RX ORDER — METOPROLOL SUCCINATE 100 MG/1
100 TABLET, EXTENDED RELEASE ORAL DAILY
Status: DISCONTINUED | OUTPATIENT
Start: 2022-07-18 | End: 2022-07-19 | Stop reason: HOSPADM

## 2022-07-18 RX ORDER — NITROGLYCERIN 0.4 MG/1
0.4 TABLET SUBLINGUAL EVERY 5 MIN PRN
Status: DISCONTINUED | OUTPATIENT
Start: 2022-07-18 | End: 2022-07-19 | Stop reason: HOSPADM

## 2022-07-18 RX ORDER — PROBENECID AND COLCHICINE 500; .5 MG/1; MG/1
1 TABLET ORAL DAILY
Status: DISCONTINUED | OUTPATIENT
Start: 2022-07-18 | End: 2022-07-19 | Stop reason: HOSPADM

## 2022-07-18 RX ORDER — DIPHENHYDRAMINE HCL 25 MG
50 TABLET ORAL NIGHTLY PRN
Status: DISCONTINUED | OUTPATIENT
Start: 2022-07-18 | End: 2022-07-19 | Stop reason: HOSPADM

## 2022-07-18 RX ORDER — ATORVASTATIN CALCIUM 20 MG/1
TABLET, FILM COATED ORAL
COMMUNITY
Start: 2022-05-23

## 2022-07-18 RX ORDER — ZINC SULFATE 50(220)MG
50 CAPSULE ORAL DAILY
Status: DISCONTINUED | OUTPATIENT
Start: 2022-07-18 | End: 2022-07-19 | Stop reason: HOSPADM

## 2022-07-18 RX ORDER — ATORVASTATIN CALCIUM 20 MG/1
20 TABLET, FILM COATED ORAL DAILY
Status: DISCONTINUED | OUTPATIENT
Start: 2022-07-18 | End: 2022-07-19 | Stop reason: HOSPADM

## 2022-07-18 RX ORDER — VITAMIN B COMPLEX
2000 TABLET ORAL DAILY
Status: DISCONTINUED | OUTPATIENT
Start: 2022-07-18 | End: 2022-07-19 | Stop reason: HOSPADM

## 2022-07-18 RX ADMIN — LEVETIRACETAM 500 MG: 500 TABLET, FILM COATED ORAL at 22:22

## 2022-07-18 RX ADMIN — FUROSEMIDE 20 MG: 20 TABLET ORAL at 12:03

## 2022-07-18 RX ADMIN — OXYCODONE HYDROCHLORIDE AND ACETAMINOPHEN 500 MG: 500 TABLET ORAL at 12:04

## 2022-07-18 RX ADMIN — PROBENECID AND COLCHICINE 1 TABLET: 500; .5 TABLET ORAL at 12:11

## 2022-07-18 RX ADMIN — Medication 30 MILLICURIE: at 09:38

## 2022-07-18 RX ADMIN — METHIMAZOLE 7.5 MG: 5 TABLET ORAL at 12:11

## 2022-07-18 RX ADMIN — METOPROLOL SUCCINATE 100 MG: 100 TABLET, FILM COATED, EXTENDED RELEASE ORAL at 12:03

## 2022-07-18 RX ADMIN — Medication 10 MILLICURIE: at 07:43

## 2022-07-18 RX ADMIN — OXYCODONE HYDROCHLORIDE AND ACETAMINOPHEN 500 MG: 500 TABLET ORAL at 22:22

## 2022-07-18 RX ADMIN — PYRIDOXINE HCL TAB 50 MG 50 MG: 50 TAB at 12:00

## 2022-07-18 RX ADMIN — REGADENOSON 0.4 MG: 0.08 INJECTION, SOLUTION INTRAVENOUS at 09:34

## 2022-07-18 RX ADMIN — ATORVASTATIN CALCIUM 20 MG: 20 TABLET, FILM COATED ORAL at 12:01

## 2022-07-18 RX ADMIN — ZINC SULFATE 220 MG (50 MG) CAPSULE 50 MG: CAPSULE at 12:01

## 2022-07-18 RX ADMIN — Medication 2000 UNITS: at 12:02

## 2022-07-18 RX ADMIN — WARFARIN SODIUM 2 MG: 2 TABLET ORAL at 12:11

## 2022-07-18 RX ADMIN — ALLOPURINOL 300 MG: 300 TABLET ORAL at 12:02

## 2022-07-18 RX ADMIN — LEVETIRACETAM 500 MG: 500 TABLET, FILM COATED ORAL at 12:03

## 2022-07-18 NOTE — CONSULTS
Inpatient Cardiology Consultation      Reason for Consult:  Chest Tightness    Consulting Physician: Dr. Ellsworth Cure    Requesting Physician:  Dr. Jess Winchester    Date of Consultation: 7/18/2022    HISTORY OF PRESENT ILLNESS:   Mr. Romario Martínez is a 68year old  male who follows with Dr. Zach Walters. He was most recently seen in the office with Dr. Zach Walters on 01/03/2022. His medical history as stated below. Mr. Romario Martínez presented to [facility] ED on 07/17/2022 with complaints of sudden onset of fatigue and forehead pressure. He states that he also had accompanying double vision. He denies accompanying chest pain, dyspnea, orthopnea or PND. He states that on 07/17/2022 he took a nap for 5 hours without alleviation of his above-stated symptoms and states upon waking he had an unsteady gait with worsening double vision. Of note, he states 2 days earlier he accidentally hit his head as he was trying to close a sliding door. Denies LOC or fall. Due to his unsteady gait and worsening double vision he presented to the ED for further evaluation. Arrival to the ED his VS were oral temperature 97.5-57-18-90 9% RA-165/76. EKG SR. Troponin of 83, 76, 73. He was admitted to a telemetry monitored unit. Lexiscan MPS was ordered. Cardiology was consulted for management of reported chest pain. Please note: past medical records were reviewed per electronic medical record (EMR) - see detailed reports under Past Medical/ Surgical History. Past Medical and Surgical  History:    TTE 10/12/2014 (Dr. Rosealee Sandifer): Definity was ordered by Dr Rosealee Sandifer and was given to improve image quality. Left ventricular size is grossly normal. Moderate left ventricular concentric hypertrophy noted. Ejection fraction is visually estimated at 35%. No evidence of left ventricular mass or thrombus noted. Mid-septal and apical area are hypokinetic. The left atrium is mild-moderately dilated. Interatrial septum appears intact.  No evidence of thrombus within left atrium. No evidence of mass within left atrium. Mildly dilated right ventricle. Physiologic and/or trace mitral regurgitation is present. No mitral valve stenosis present. Physiologic and/or trace tricuspid regurgitation. RVSP is 20 mmHg. Regular rhythm. Cardiac catheterization 10/13/2014 (Dr. Erich Wheelre): Subtotal occlusion of mid LAD with successful balloon angioplasty and deployment of 3.0 x 18 mm Expedition drug-eluting stent with 0% residual stenosis and mild pinching of the ostium of the first diagonal branch estimated at 20% to 30%. TTE 11/16/2015 (Dr. Erich Wheeler): %. Stage I Diasolic Dysfunction. Mild MR. Lexiscan MPS 08/03/2016: Nonischemic. No WMA. EF 64%. 24 Hour Holter Monitor 03/21/2022: Longest pause 20.4 seconds, SVT with , Bigeminy (strips are not able to be reviewed at this time, requested)  HTN  HLD  Obesity   Hyperthyroidism, on Tapazole   CKD (SCr 1.5 on 02/10/2022)  Pulmonary Embolism after back surgery   Gout  Large right frontal subacute subdural hematoma S/p Right frontal bur hole for evacuation of subdural hematoma, placement of subdural drain on 06/25/2021 (Dr. Yumiko Longoria)  LLE DVT 02/2022  S/p IVC Filter 02/06/2022  Chronic anticoagulation with Coumadin       Medications Prior to admit:  Prior to Admission medications    Medication Sig Start Date End Date Taking? Authorizing Provider   atorvastatin (LIPITOR) 20 MG tablet TAKE ONE TABLET BY MOUTH EVERY DAY 5/23/22   Historical Provider, MD   levETIRAcetam (KEPPRA) 500 MG tablet Take 1 tablet by mouth 2 times daily 5/17/22   Sherry Galvan MD   warfarin (COUMADIN) 5 MG tablet Take 1 tablet by mouth daily Take 2 mg SAT, SUN, TUE, THURS    Take 3mg MON, WED, FRI    INR weekly with PCP and Adjust as needed. Patient taking differently: Take 2 mg by mouth in the morning. Take 2 mg SUN, TUE, THURS, SAT    Take 1mg MON, WED, FRI    INR weekly with PCP and Adjust as needed. . 2/12/22   Mila Heard MD   furosemide (LASIX) 20 MG tablet TAKE ONE TABLET BY MOUTH DAILY  Patient not taking: Reported on 7/18/2022 11/17/21   Historical Provider, MD   metoprolol succinate (TOPROL XL) 100 MG extended release tablet Take 1 tablet by mouth daily 1/3/22   Josafat Gutierrez MD   vitamin B-6 (B-6) 50 MG tablet Take 1 tablet by mouth daily 9/12/21   Philip Estrada MD   Vitamin D (CHOLECALCIFEROL) 50 MCG (2000 UT) TABS tablet Take 1 tablet by mouth daily 9/12/21   Philip Estrada MD   zinc sulfate (ZINCATE) 220 (50 Zn) MG capsule Take 1 capsule by mouth daily 9/12/21   Philip Estrada MD   ascorbic acid (VITAMIN C) 500 MG tablet Take 1 tablet by mouth 2 times daily 9/11/21   Philip Estrada MD   methIMAzole (TAPAZOLE) 5 MG tablet Take 1.5 tablets by mouth daily 1.5 tabs 7/22/21   Ada James MD   colchicine-probenecid 0.5-500 MG per tablet TAKE ONE TABLET BY MOUTH EVERY DAY 10/7/20   Historical Provider, MD   nitroGLYCERIN (NITROSTAT) 0.4 MG SL tablet Place 1 tablet under the tongue every 5 minutes as needed for Chest pain. Patient not taking: Reported on 7/18/2022 10/14/14   Josafat Gutierrez MD   allopurinol (ZYLOPRIM) 300 MG tablet Take 300 mg by mouth daily.     Historical Provider, MD       Current Medications:    Current Facility-Administered Medications: diphenhydrAMINE (BENADRYL) tablet 50 mg, 50 mg, Oral, Nightly PRN  allopurinol (ZYLOPRIM) tablet 300 mg, 300 mg, Oral, Daily  nitroGLYCERIN (NITROSTAT) SL tablet 0.4 mg, 0.4 mg, SubLINGual, Q5 Min PRN  colchicine-probenecid 0.5-500 MG per tablet 1 tablet, 1 tablet, Oral, Daily  methIMAzole (TAPAZOLE) tablet 7.5 mg, 7.5 mg, Oral, Daily  vitamin B-6 (PYRIDOXINE) tablet 50 mg, 50 mg, Oral, Daily  vitamin D (CHOLECALCIFEROL) tablet 2,000 Units, 2,000 Units, Oral, Daily  zinc sulfate (ZINCATE) capsule 50 mg, 50 mg, Oral, Daily  ascorbic acid (VITAMIN C) tablet 500 mg, 500 mg, Oral, BID  furosemide (LASIX) tablet 20 mg, 20 mg, Oral, Daily  metoprolol succinate (TOPROL XL) extended release tablet 100 mg, 100 mg, Oral, Daily  warfarin (COUMADIN) tablet 2 mg, 2 mg, Oral, Daily  levETIRAcetam (KEPPRA) tablet 500 mg, 500 mg, Oral, BID  atorvastatin (LIPITOR) tablet 20 mg, 20 mg, Oral, Daily  regadenoson (LEXISCAN) injection 0.4 mg, 0.4 mg, IntraVENous, ONCE PRN  sodium chloride flush 0.9 % injection 10 mL, 10 mL, IntraVENous, 2 times per day  sodium chloride flush 0.9 % injection 10 mL, 10 mL, IntraVENous, PRN  0.9 % sodium chloride infusion, , IntraVENous, PRN  promethazine (PHENERGAN) tablet 12.5 mg, 12.5 mg, Oral, Q6H PRN **OR** ondansetron (ZOFRAN) injection 4 mg, 4 mg, IntraVENous, Q6H PRN  polyethylene glycol (GLYCOLAX) packet 17 g, 17 g, Oral, Daily PRN  acetaminophen (TYLENOL) tablet 650 mg, 650 mg, Oral, Q6H PRN **OR** acetaminophen (TYLENOL) suppository 650 mg, 650 mg, Rectal, Q6H PRN    Allergies:  Patient has no known allergies. Social History:    Denies tobacco, alcohol, illicit drug use. Caffeine intake includes 3 cans of [employer] a day. States that he is active with exercise and uses a elliptical and weights daily. Denies chest pain or dyspnea with these activities. Family History:   Please note this information was not obtained at this time, as it it limited in nature due to the patient's advanced age. REVIEW OF SYSTEMS:     Constitutional: Denies fevers, chills, night sweats, and complains of unsteady gait with double vision and fatigue-see HPI  HEENT: Denies nose bleeds, oral pain, abscess or lesion. Complains of forehead headache-see HPI. Complains of double vision-see HPI  Musculoskeletal: Denies falls, pain to BLE with ambulation and edema to BLE. Complains of unsteady gait-see HPI  Neurological: Denies dizziness and lightheadedness, numbness and tingling  Cardiovascular: Denies chest pain, palpitations, and feelings of heart racing.    Respiratory: Denies orthopnea and PND  Gastrointestinal: Denies heartburn, nausea/vomiting, diarrhea and constipation, black/bloody, and tarry stools. Genitourinary: Denies dysuria and hematuria  Hematologic: Denies excessive bruising or bleeding  Lymphatic: Denies lumps and bumps to neck, axilla, breast, and groin  Endocrine: Denies excessive thirst. Denies intolerance to hot and cold  Psychiatric: Denies anxiety and depression. PHYSICAL EXAM:   BP (!) 154/87   Pulse 63   Temp 97.9 °F (36.6 °C) (Oral)   Resp 18   Ht 6' (1.829 m)   Wt 230 lb (104.3 kg)   SpO2 96%   BMI 31.19 kg/m²   CONST:  Well developed, obese, elderly  male who appears stated age. Awake, alert, cooperative, no apparent distress  HEENT:   Head- Normocephalic, atraumatic   Eyes- Conjunctivae pink, anicteric  Throat- Oral mucosa pink and moist  Neck-  No stridor, trachea midline, no jugular venous distention. No adenopathy   CHEST: Chest symmetrical and non-tender to palpation. No accessory muscle use or intercostal retractions  RESPIRATORY: Lung sounds - clear throughout fields   CARDIOVASCULAR:     No carotid bruit  Heart Inspection- shows no noted pulsations  Heart Palpation- no heaves or thrills; PMI is non-displaced   Heart Ausculation- Regular rate and rhythm, no murmur. No s3, or rub   PV: Trace bilateral lower extremity edema. No varicosities. Pedal pulses palpable, no clubbing or cyanosis   ABDOMEN: Soft, pes, non-tender to light palpation. Bowel sounds present. No palpable masses no organomegaly; no abdominal bruit  MS: Good muscle strength and tone. No atrophy or abnormal movements. : Deferred  SKIN: Warm and dry no statis dermatitis or ulcers   NEURO / PSYCH: Oriented to person, place and time. Speech clear and appropriate. Follows all commands.  Pleasant affect     DATA:    ECG: As above  Tele strips: Currently SR with HR in the 60s  Diagnostic:      Intake/Output Summary (Last 24 hours) at 7/18/2022 0755  Last data filed at 7/18/2022 0650  Gross per 24 hour   Intake 180 ml   Output 350 ml   Net -170 ml       Labs:   CBC: per Primary Service  Requested records from recent Holter monitor 03/2022 (currently unavailable for review)  Further recommendations to follow pending patient's clinical course and above testing results  Above as discussed with Dr. Hong Back    Electronically signed by LOLA Stafford CNP on 7/18/2022 at 7:55 AM      Inpatient CARDIOLOGY Consultation          Our MIGUEL exam, assessment reviewed and reflect my work. I personally saw, examined, and evaluated the patient today. I spent more than 50% of total consult time today. I am the primary author for the consult notes. I personally reviewed the medications, rhythm strips, pertinent labs and test reports. I directly participated in the medical-decision making, ordering pertinent tests and medication adjustment. Reason for Consult:  Elevated Troponin     Date of Consultation: 7/18/2022     Patient previously known to Dr. Earl Jacobo: 9year old with history of Bilateral renal cysts, CAD (coronary artery disease) s/p PCI to LAD in 2014; Cardiomyopathy [facility]), Cerebral artery occlusion with cerebral infarction [facility]), Hemorrhagic stroke ([facility] [address] Gout, Pulmonary embolism, DVT s/p IVC filter, Hyperlipidemia, Hypertension, Hyperthyroidism, Obesity due to excess calories, Thyroid cyst resented with for evlauation of fatigue. Patient states that sudden onset of fatigue and forehead pressure. He states that he also had accompanying double vision. He denies accompanying chest pain, dyspnea, orthopnea or PND. He states that on 07/17/2022 he took a nap for 5 hours without alleviation of his above-stated symptoms and states upon waking he had an unsteady gait with worsening double vision. Of note, he states 2 days earlier he accidentally hit his head as he was trying to close a sliding door. Denies LOC or fall. Due to his unsteady gait and worsening double vision he presented to the ED for further evaluation.   Arrival to the ED his VS were oral temperature 97.5-57-18-90 9% RA-165/76. EKG SR. Troponin of 83, 76, 73. He was admitted to a telemetry monitored unit. Lexiscan MPS was ordered. Cardiology was consulted for elevated Troponin. He states that he has no chest pain or shortness of breath. Patient states that he then went and slept for about 5 hours which is uncommon for him. Patient states that he woke up here into the ER for further evaluation. States he has been compliant with his medications. States that he is on Coumadin for PEs. REVIEW OF SYSTEMS:   Review of rest of 12 systems negative except as mentioned in HPI. Constitutional: Denies fatigue, fevers, chills or night sweats  Eyes: Denies visual changes or drainage  ENT: Denies headaches or hearing loss. No mouth sores or sore throat. No epistaxis  Cardiovascular: Denies chest pain, pressure or palpitations. No lower extremity swelling. No orthopnea or PND. Respiratory: Denies BELLA, cough, no hemoptysis  Gastrointestinal: Denies nausea, vomiting, hematemesis or melena    Genitourinary: Denies urgency, dysuria or hematuria. Musculoskeletal: Denies gait disturbance, weakness or joint complaints  Integumentary: Denies rash, hives or pruritis  Neurological: Denies dizziness, headaches or seizures. No numbness or tingling  Psychiatric: Denies anxiety or depression. Endocrine: Denies temperature intolerance. No recent weight change. Hematologic/Lymphatic: Denies abnormal bruising or bleeding. No swollen lymph nodes     Please note: past medical records were reviewed per electronic medical record (EMR) - see detailed reports under Past Medical/ Surgical History.         Past Medical History:    Past Medical History        Past Medical History:   Diagnosis Date    Bilateral renal cysts 10/13/2014    CAD (coronary artery disease)      Cardiomyopathy (HCC)      Cerebral artery occlusion with cerebral infarction [facility])      Chest pain       8-4-2016 lexiscan stress Hemorrhagic stroke ([facility] [address]      History of gout 10/13/2014    History of pulmonary embolism 10/13/2014    Hyperlipidemia      Hypertension      Hyperthyroidism 10/13/2014    NSTEMI (non-ST elevated myocardial infarction) ([facility] [address] 10/13/2014    Obesity due to excess calories      Thyroid cyst 10/13/2014    Thyroid disease              Past Surgical History:    Past Surgical History         Past Surgical History:   Procedure Laterality Date    BACK SURGERY        CORONARY ANGIOPLASTY WITH STENT PLACEMENT   10-     Dr. Primo Galicia 3.0x18 MID LAD    CRANIOTOMY Right 6/25/2021     RIGHT FRONTAL REMINGTON HOLE TO DRAIN HEMATOMA AND PLACEMENT OF SUBDURAL DRAIN performed by Cyn Garcia MD at [place] [address] CATH LAB PROCEDURE   10/13/2014     Dr. Dora Jeong: PCI to LAD    IR IVC FILTER PLACEMENT W IMAGING   2/6/2022     IR IVC FILTER PLACEMENT W IMAGING 2/6/2022 [facility]               Allergies:    Patient has no known allergies.      Social History:    Social History               Socioeconomic History    Marital status:        Spouse name: Not on file    Number of children: Not on file    Years of education: Not on file    Highest education level: Not on file   Occupational History    Not on file   Tobacco Use    Smoking status: Never    Smokeless tobacco: Never   Vaping Use    Vaping Use: Never used   Substance and Sexual Activity    Alcohol use: Yes       Comment: occasional. No caffeine    Drug use: No    Sexual activity: Not on file   Other Topics Concern    Not on file   Social History Narrative    Not on file      Social Determinants of Health      Financial Resource Strain: Not on file   Food Insecurity: Not on file   Transportation Needs: Not on file   Physical Activity: Not on file   Stress: Not on file   Social Connections: Not on file   Intimate Partner Violence: Not on file   Housing Stability: Not on file            Family History:   Family History         Family History   Problem Relation Age of Onset    Heart Disease Mother           cabg 3    Heart Disease Father           pacemaker               Medications Prior to admit: Reviewed  Home Medications           Prior to Admission medications   Medication Sig Start Date End Date Taking? Authorizing Provider   atorvastatin (LIPITOR) 20 MG tablet TAKE ONE TABLET BY MOUTH EVERY DAY 5/23/22     Historical Provider, MD   levETIRAcetam (KEPPRA) 500 MG tablet Take 1 tablet by mouth 2 times daily 5/17/22     Coleen Amaral MD   warfarin (COUMADIN) 5 MG tablet Take 1 tablet by mouth daily Take 2 mg SAT, SUN, TUE, THURS     Take 3mg MON, WED, FRI     INR weekly with PCP and Adjust as needed. Patient taking differently: Take 2 mg by mouth in the morning. Take 2 mg SUN, TUE, THURS, SAT     Take 1mg MON, WED, FRI     INR weekly with PCP and Adjust as needed. . 2/12/22     Shaheen Busby MD   furosemide (LASIX) 20 MG tablet TAKE ONE TABLET BY MOUTH DAILY  Patient not taking: Reported on 7/18/2022 11/17/21     Historical Provider, MD   metoprolol succinate (TOPROL XL) 100 MG extended release tablet Take 1 tablet by mouth daily 1/3/22     Cindy Riley MD   vitamin B-6 (B-6) 50 MG tablet Take 1 tablet by mouth daily 9/12/21     Marilynn Heard MD   Vitamin D (CHOLECALCIFEROL) 50 MCG (2000 UT) TABS tablet Take 1 tablet by mouth daily 9/12/21     Marilynn Heard MD   zinc sulfate (ZINCATE) 220 (50 Zn) MG capsule Take 1 capsule by mouth daily 9/12/21     Marilynn Heard MD   ascorbic acid (VITAMIN C) 500 MG tablet Take 1 tablet by mouth 2 times daily 9/11/21     Marilynn Heard MD   methIMAzole (TAPAZOLE) 5 MG tablet Take 1.5 tablets by mouth daily 1.5 tabs 7/22/21     Farnklin Quiroga MD   colchicine-probenecid 0.5-500 MG per tablet TAKE ONE TABLET BY MOUTH EVERY DAY 10/7/20     Historical Provider, MD   nitroGLYCERIN (NITROSTAT) 0.4 MG SL tablet Place 1 tablet under the tongue every 5 minutes as needed for Chest pain.  Patient not taking: Reported on 7/18/2022 10/14/14     Estefanía Thomas MD   allopurinol (ZYLOPRIM) 300 MG tablet Take 300 mg by mouth daily. Historical Provider, MD               DATA:       ECG - Reviewed     Tele strips: Reviewed     Diagnostic:        Intake/Output Summary (Last 24 hours) at 7/18/2022 0932  Last data filed at 7/18/2022 0650      Gross per 24 hour   Intake 180 ml   Output 350 ml   Net -170 ml         IMAGING STUDIES: Reviewed           LABS:       Cardiac enzymes:No results for input(s): CKTOTAL, CKMB, CKMBINDEX in the last 72 hours. Invalid input(s): TROPONIN  CBC:        Recent Labs     07/17/22 1843 07/18/22  0703   WBC 4.3* 4.4*   HGB 17.4* 17.4*   HCT 53.6 54.3*   PLT 81* 70*      BMP:        Recent Labs     07/17/22 1843 07/18/22  0703    140   K 4.5 4.1   CO2 28 25   BUN 23 21   CREATININE 1.7* 1.4*   LABGLOM 39 49   CALCIUM 9.4 9.4      Mag: No results for input(s): MG in the last 72 hours. Phos: No results for input(s): PHOS in the last 72 hours. TSH: No results for input(s): TSH in the last 72 hours. HgA1c:         Lab Results   Component Value Date     LABA1C 5.7 10/13/2014      No results found for: EAG  proBNP: No results for input(s): PROBNP in the last 72 hours. PT/INR:       Recent Labs     07/17/22  2018   PROTIME 23.6*   INR 2.0      APTT:No results for input(s): APTT in the last 72 hours.   FASTING LIPID PANEL:        Lab Results   Component Value Date/Time     CHOL 168 08/04/2016 06:27 AM     HDL 48 08/04/2016 06:27 AM     LDLCALC 83 08/04/2016 06:27 AM     TRIG 187 08/04/2016 06:27 AM      LIVER PROFILE:       Recent Labs     07/17/22 1843 07/18/22  0703   AST 41* 38   ALT 37 36   LABALBU 3.8 3.6         Current Inpatient Medications:  Scheduled Medications    allopurinol  300 mg Oral Daily    colchicine-probenecid  1 tablet Oral Daily    methIMAzole  7.5 mg Oral Daily    pyridoxine  50 mg Oral Daily    Vitamin D  2,000 Units Oral Daily    zinc sulfate  50 mg Oral Daily    ascorbic acid  500 mg Oral BID    furosemide  20 mg Oral Daily    metoprolol succinate  100 mg Oral Daily    warfarin  2 mg Oral Daily    levETIRAcetam  500 mg Oral BID    atorvastatin  20 mg Oral Daily    sodium chloride flush  10 mL IntraVENous 2 times per day            IV Infusions (if any): Infusions Meds    sodium chloride              PHYSICAL EXAM:      BP (!) 154/87   Pulse 63   Temp 97.9 °F (36.6 °C) (Oral)   Resp 18   Ht 6' (1.829 m)   Wt 230 lb (104.3 kg)   SpO2 96%   BMI 31.19 kg/m²      CONST:  Well developed, appears stated age. Awake, alert and no apparent distress. HEENT:   Head- Normocephalic  Eyes- Conjunctivae pink, no icterus  Throat- Oral mucosa moist  Neck-  No stridor, no jugular venous distention. No carotid bruit. CHEST: Chest symmetrical and non-tender to palpation. No accessory muscle use  RESPIRATORY: Lung sounds - Few rhonchi  CARDIOVASCULAR:     Heart Inspection-  Heart Palpation- No thrills. Heart Ausculation- Regular rate and rhythm, 1/6 systolic murmur. No s3 or rub  EXT: No lower extremity edema. Distal pulses palpable, no cyanosis  ABDOMEN: Soft, non-tender to light palpation. Bowel sounds present. No abdominal bruit  MS: Good muscle strength    : Deferred  RECTAL: Deferred  SKIN: Warm and dry  NEURO / PSYCH: Oriented to person, place; Good mood and affect. IMPRESSION / RECOMMENDATIONS:     Elevated troponin - No CP; Hx of Troponin elevation, Likely from CKD. EKG reviewed, Lexiscan stress ordered for today    Transient double vision - per Dr Gerson Philip; Resolved, No josey or tachyarrhythmias;  No tingling/numbness    CAD s/p PCI - Continue BB, Statin, No ASA due to Coumadin    Hx of DVT left lower extremity (HCC) - s/p IVC filter - On Coumadin - INR 2.0    Hx of Bradycardia - If dizzy or syncope, need Event monitor/EP referral    CKD    Primary hypertension    Hx of subdural hematoma s/p right frontal ellie hole    Hx of PE              If stress test normal, cardiology will sign off  Above recommendations discussed with him.     F/u by Dr Gogo Santiago after discharge    Electronically signed by Hebert Celestin MD on 7/18/2022 at 9:32 AM  [facility]) Cardiology

## 2022-07-18 NOTE — PROGRESS NOTES
Admitting Date and Time: 7/17/2022  6:10 PM  Admit Dx: Chest tightness [R07.89]  Elevated troponin [R77.8]  Congestion of nasal sinus [R09.81]    Subjective:    Pt feels ok. No chest pain  Per RN: no issues    ROS: denies fever, chills, cp, sob, n/v, HA unless stated above.      allopurinol  300 mg Oral Daily    colchicine-probenecid  1 tablet Oral Daily    methIMAzole  7.5 mg Oral Daily    pyridoxine  50 mg Oral Daily    Vitamin D  2,000 Units Oral Daily    zinc sulfate  50 mg Oral Daily    ascorbic acid  500 mg Oral BID    furosemide  20 mg Oral Daily    metoprolol succinate  100 mg Oral Daily    warfarin  2 mg Oral Daily    levETIRAcetam  500 mg Oral BID    atorvastatin  20 mg Oral Daily    sodium chloride flush  10 mL IntraVENous 2 times per day     diphenhydrAMINE, 50 mg, Nightly PRN  nitroGLYCERIN, 0.4 mg, Q5 Min PRN  sodium chloride flush, 10 mL, PRN  sodium chloride, , PRN  promethazine, 12.5 mg, Q6H PRN   Or  ondansetron, 4 mg, Q6H PRN  polyethylene glycol, 17 g, Daily PRN  acetaminophen, 650 mg, Q6H PRN   Or  acetaminophen, 650 mg, Q6H PRN         Objective:    BP (!) 154/87   Pulse 63   Temp 97.9 °F (36.6 °C) (Oral)   Resp 18   Ht 6' (1.829 m)   Wt 230 lb (104.3 kg)   SpO2 96%   BMI 31.19 kg/m²   General Appearance: alert and oriented to person, place and time and in no acute distress  Skin: warm and dry  Head: normocephalic and atraumatic  Eyes: pupils equal, round, and reactive to light, extraocular eye movements intact, conjunctivae normal  Neck: neck supple and non tender without mass   Pulmonary/Chest: clear to auscultation bilaterally- no wheezes, rales or rhonchi, normal air movement, no respiratory distress  Cardiovascular: normal rate, normal S1 and S2 and no carotid bruits  Abdomen: soft, non-tender, non-distended, normal bowel sounds, no masses or organomegaly  Extremities: no cyanosis, no clubbing and no  edema  Neurologic: no cranial nerve deficit and speech normal      Recent Labs Occupational Therapy Daily Treatment      Visit Count: 8     Plan of Care Dates: Initial: 5/24/2017 Through: 8/16/2017    Referred by: Giselle Bonilla MD  Medical Diagnosis (from order): I63.9 Acute CVA (cerebrovascular accident) (CMS/[facility])  Z95.2 S/P AVR  I63.9 Cerebrovascular accident (CVA), unspecified mechanism (CMS/[facility])   Treatment Diagnosis: pain, impaired motor control, muscle imbalance, impaired range of motion, impaired joint mobility, movement coordination impairments, decreased independence in ADLs and IADLs, increase symptoms with increased activity, impaired posture, altered sensation, impaired cognition and impaired affect following Cerebrovascular Accident (CVA)    Insurance: 1. [employer]  2. N/A  Insurance Information:   THERAPY BENEFITS:  PAYOR: [facility] - CHOICE  VISIT LIMIT: 20 VISITS PER CALENDAR YEAR - HARD LIMIT  AUTHORIZATION NEEDED: NO      DEDUCTIBLE: $350.00 MET: $YES  OUT OF POCKET: $1350.00 MET: $YES  COINSURANCE: 0%  COPAY: $20  REF #: ONLINE  Next Referring Provider Visit: unsure     Past Medical/Surgical History:  See Neuro Rehabilitation intake sheet for additional information     SUBJECTIVE     I am driving now.   Current Pain: 0/10.    Functional Change: reaching for more things and crossing over the body. Controlling tremors better and happening less.     OBJECTIVE     [place] Arm and Hand Activity Inventory   Activity Scale     1. Total assist (weak U/L < 25%)  2. Maximal assist (weak U/L 25-49%)  3. Moderate assist (weak U/L 50-74%) 4. Minimal Assist (weak U/L > 75%)  5. Supervision  6. Modified Elizabeth (device) 7. Complete Elizabeth (timely, safely)     Affected Limb  Score   1. Open jar of coffee _X_ holds jar __ holds lid 7   2. Call 911 _X_ holds  __ dials phone 6   3. Draw line with ruler _X_ holds ruler __holds pen 6   4. Pour glass of water _X  holds glass __ holds pitcher 7   5. Wring out washcloth   7   6. Do up 5 buttons   4   7. Dry back with towel __  07/17/22  1843 07/18/22  0703    140   K 4.5 4.1    105   CO2 28 25   BUN 23 21   CREATININE 1.7* 1.4*   GLUCOSE 80 98   CALCIUM 9.4 9.4       Recent Labs     07/17/22 1843 07/18/22  0703   ALKPHOS 112 104   PROT 6.5 6.1*   LABALBU 3.8 3.6   BILITOT 0.8 1.0   AST 41* 38   ALT 37 36       Recent Labs     07/17/22 1843 07/18/22  0703   WBC 4.3* 4.4*   RBC 5.40 5.48   HGB 17.4* 17.4*   HCT 53.6 54.3*   MCV 99.3 99.1   MCH 32.2 31.8   MCHC 32.5 32.0   RDW 14.2 14.2   PLT 81* 70*   MPV 11.4 11.8           Radiology:   CT HEAD WO CONTRAST   Final Result   1. No acute intracranial hemorrhage or edema. 2. Stable prominence of CSF spaces overlying frontal lobes   3. Stable incidental arachnoid cyst in left middle cranial fossa. XR CHEST (2 VW)   Final Result   No pneumonia or pleural effusion. NM Cardiac Stress Test Nuclear Imaging    (Results Pending)       Assessment:  Principal Problem:    Elevated troponin  Active Problems:    Chest tightness    NSTEMI (non-ST elevated myocardial infarction) ([address]    Hyperthyroidism    Acute deep vein thrombosis (DVT) of femoral vein of left lower extremity ([address]    Primary hypertension  Resolved Problems:    * No resolved hospital problems. *      Plan: History of present illness from History and Physical:  This is a 68 y.o. male  has a past medical history of Bilateral renal cysts, CAD (coronary artery disease), Cardiomyopathy ([address] Cerebral artery occlusion with cerebral infarction [facility]), Chest pain, Hemorrhagic stroke ([facility] History of gout, History of pulmonary embolism, Hyperlipidemia, Hypertension, Hyperthyroidism, NSTEMI (non-ST elevated myocardial infarction) ([address] Obesity due to excess calories, Thyroid cyst, and Thyroid disease. presented with chest discomfor  for last few days prior to arrival to the hospital. Initially chest discomfort was mild, intermittent but gradually getting more persistent. Started gradually.  Exacerbated by general reaches for towel _X_ grasps towel end 6   8. Put paste on toothbrush __ holds toothpaste X__ holds brush 7   9. Cut medium resistance putty __ holds knife X__ holds fork 6   Total Score   56 /63       Coordination: (seconds)   Left Right   Date 7/11/17 Initial  Initial   9 Hole Peg  Pegs from stabilized hand versus dish - 1'21\"    Previous score 0 as patient unable to do at initial eval  Norms:   9 hole: Age: 55-59: male: left 21.0 +/-3.2, right 19.2 +/-2.6;  female: left 19.4 +/-2.3, right 17.8 +/-2.6      Treatment   Neuromuscular Reeducation:   · Proximal strengthening: reach with small boundaries for transport. Cues for posture, scapular setting and initiation. Pt needed extra time and multiple reps coming out of small space with left ue.   · Individual finger movement: music glove with single and double finger use per song. Pt able to achieve opposition with lateral pinch, tip pinch index and middle fingers up to 75% of reps each. Cues for ue and hand position to increase arom. Pre-typing with finger flexion individual - needed min assist to facilitate position and to reduce gross compensation. Finger extension individual with min assist to limit compensation and verbal cues to assist with facilitation of proper finger movement.   · Kinesio tape to left thumb and index CMC space to reduce edeama - more wrinkles noted at end of session at CMC joint.   Current Home Program (not performed this date except as noted above):   NMES flexion with sponge up to 30 minutes 1 - 2 X per day  NMES with putty flexion exercises 5 reps of each    HEP: ER supine with cane stretch with AAROM, wall slide in flexion and supine ER bilateral stretch.  ASSESSMENT   Pt fell last week and injured his left thumb. Per patient x-ray was negative for a fracture but he still has pain and edema in the joint. He is able to do arom but is unable to do resistive pinch or grasp at this time.     Pain after treatment: 0/10  Result of above  activity or exertion. Relieved only partially by rest.  The patient was seen and examined at bedside, appears alert and awake with no acute distress and is able to answer simple  questions. On direct questioning, patient denied any  resting ongoing chest pain, resting SOB, hemoptysis, productive cough, fever, ongoing palpitation, active abdominal pain, hematemesis, rectal bleeding, woody, hematuria, any other  and GI complaints, and any new focal neuro deficits. Chest Discomfort: multiple risk factor including known h/o cad  EKG: Showed no specific ST elevation or depression      Initial troponin 83-->76--> 74  Continue Statins  and Betablocker. Stress test per Cardiology    2. Hypertension  Currently better controlled  Will resume home medications as needed. Monitor vitals and adjust BP meds as needed  3. H/o VTE: warfarin. INR daily  4  hemorrhagic stroke: caution with above  5. No change to outpatient treatment regimen for the existing stable combordities including: Hyperthyroidism, hyperlipidemia, obesity, gout  6. Full code  7. DVT Prophylaxis : systemic anticoagulation with coumadin  8. Dispo: home when ready    NOTE: This report was transcribed using voice recognition software. Every effort was made to ensure accuracy; however, inadvertent computerized transcription errors may be present.      Electronically signed by Filomena Villeda MD on 7/18/2022 at 1:05 PM outlined education: Verbalizes understanding and Needs reinforcement    Goals:       To be obtained by end of this plan of care:  1. Patient independent with modified and progressed home exercise program.  2. Pt will perform object manipulation with left hand as shown by 9 - hole peg test score of under 45 seconds for handling coins and small parts for home maintenance MOD I.   3. Pt will lift and retrieve objects over head with bilateral ue and left ue with no pain up to 5# MOD I with symmetrical ue movement.  4. Pt will demonstrate adequate visual scanning and reaction for community re-entry alone as demonstrated by passing scores on gas/brake timer and dynavision. MET    PLAN     Individual finger movement, music glove one song easy for tip opposition to each finger.     Check proximal HEP, repetitive grasp and release, bilateral activities    THERAPY DAILY BILLING   Primary Insurance: [employer] [facility]  Secondary Insurance: N/A  Plan of Care Dates: Initial: 5/24/2017 Through: 8/16/2017  Evaluation Procedures:  No evaluation codes were used on this date of service    Timed Procedures:  Neuromuscular Re-Education, 60 minutes    Untimed Procedures:  No untimed codes were used on this date of service    Total Treatment Time: 60 minutes    Per Yesy Dillon  we can use what we have and try for more. 6/8/17  [phone]    Notify her at visit 17

## 2022-07-18 NOTE — PROGRESS NOTES
Lexiscan Stress Test:    Reason for study: Elevated Troponin    Resting EKG showed Sinus rhythm  Exam:  Heart - regular, Lungs- clear    Patient received 0.4mg Lexiscan per protocol    Symptoms: No chest pain, No short of breath    EKG:  No ischemia or arrhythmia during Lexiscan infusion. Maximal heart rate 78        Peak /84 mmHg       Post test complications: None      SPECT image report pending.     Electronically signed by Aleja Corcoran MD on 7/18/2022 at 9:43 AM

## 2022-07-18 NOTE — DISCHARGE INSTRUCTIONS
Your information:  Name: Aj Alberto  : 1945    Your instructions:    Continue taking coumadin like you were before this hospital stay. According to what you found out on 22 this was alternating doses between 6 mg every other day with 3 mg every other day    Discharged home. Please make and keep any follow up appointments. If you have increased shortness of breath, increased cough or sputum production, have increased weakness, become dizzy, fall or pass ou, have nausea or vomiting, chest pain, fever or chills, please call Dr. Adam Gabriel or return to the emergency room. What to do after you leave the hospital:    Recommended diet: cardiac diet    Recommended activity: activity as tolerated        The following personal items were collected during your admission and were returned to you:    Belongings  Dental Appliances: None  Vision - Corrective Lenses: None  Hearing Aid: None  Clothing: Footwear, Pants, Shirt  Jewelry: Bracelet  Body Piercings Removed: No  Electronic Devices: Cell Phone,   Weapons (Notify Protective Services/Security): None  Other Valuables: Sent home  Home Medications: None  Valuables Given To: Family (Comment)  Provide Name(s) of Who Valuable(s) Were Given To: wife  Responsible person(s) in the waiting room: wife  Patient approves for provider to speak to responsible person post operatively: Yes    Information obtained by:  By signing below, I understand that if any problems occur once I leave the hospital I am to contact Dr. Adam Gabriel. I understand and acknowledge receipt of the instructions indicated above.

## 2022-07-18 NOTE — H&P
[address] [place], [place] [address]ist Group   HISTORY AND PHYSICAL EXAM      AUTHOR: Suzanne Garcia MD PATIENT NAME: Sherren Harvest   DATE: 2022 MRN: [id number], : 1945   Primary Care Physician: LOLA Mosley - SID     CHIEF COMPLAINT / REASON FOR ADMISSION:  Chest discomfort,      HPI:   This is a 68 y.o. male  has a past medical history of Bilateral renal cysts, CAD (coronary artery disease), Cardiomyopathy ([facility] Cerebral artery occlusion with cerebral infarction [facility]), Chest pain, Hemorrhagic stroke ([facility] History of gout, History of pulmonary embolism, Hyperlipidemia, Hypertension, Hyperthyroidism, NSTEMI (non-ST elevated myocardial infarction) ([facility] Obesity due to excess calories, Thyroid cyst, and Thyroid disease. presented with chest discomfor  for last few days prior to arrival to the hospital. Initially chest discomfort was mild, intermittent but gradually getting more persistent. Started gradually. Exacerbated by general activity or exertion. Relieved only partially by rest.  The patient was seen and examined at bedside, appears alert and awake with no acute distress and is able to answer simple  questions. On direct questioning, patient denied any  resting ongoing chest pain, resting SOB, hemoptysis, productive cough, fever, ongoing palpitation, active abdominal pain, hematemesis, rectal bleeding, woody, hematuria, any other  and GI complaints, and any new focal neuro deficits.     ROS:  Pertinent positives and negatives are noted in the HPI, all other systems are reviewed and negative    PMH:  Past Medical History:   Diagnosis Date    Bilateral renal cysts 10/13/2014    CAD (coronary artery disease)     Cardiomyopathy (HCC)     Cerebral artery occlusion with cerebral infarction [facility])     Chest pain     2016 lexiscan stress    Hemorrhagic stroke [facility])     History of gout 10/13/2014    History of pulmonary embolism 10/13/2014    Hyperlipidemia     Hypertension Hyperthyroidism 10/13/2014    NSTEMI (non-ST elevated myocardial infarction) ([facility] 10/13/2014    Obesity due to excess calories     Thyroid cyst 10/13/2014    Thyroid disease        Surgical History:  Past Surgical History:   Procedure Laterality Date    BACK SURGERY      CORONARY ANGIOPLASTY WITH STENT PLACEMENT  10-    Dr. Jaimie Pabon 3.0x18 MID LAD    CRANIOTOMY Right 6/25/2021    RIGHT FRONTAL REMINGTON HOLE TO DRAIN HEMATOMA AND PLACEMENT OF SUBDURAL DRAIN performed by Surjit Foley MD at [place] [address] CATH LAB PROCEDURE  10/13/2014    Dr. Neli Beach: PCI to LAD    IR IVC FILTER PLACEMENT W IMAGING  2/6/2022    IR IVC FILTER PLACEMENT W IMAGING 2/6/2022 [facility] SPECIAL PROCEDURES       Medications Prior to Admission:    Prior to Admission medications    Medication Sig Start Date End Date Taking? Authorizing Provider   atorvastatin (LIPITOR) 20 MG tablet TAKE ONE TABLET BY MOUTH EVERY DAY 5/23/22   Historical Provider, MD   levETIRAcetam (KEPPRA) 500 MG tablet Take 1 tablet by mouth 2 times daily 5/17/22   Surjit Foley MD   warfarin (COUMADIN) 5 MG tablet Take 1 tablet by mouth daily Take 2 mg SAT, SUN, TUE, THURS    Take 3mg MON, WED, FRI    INR weekly with PCP and Adjust as needed. Patient taking differently: Take 2 mg by mouth in the morning. Take 2 mg SUN, TUE, THURS, SAT    Take 1mg MON, WED, FRI    INR weekly with PCP and Adjust as needed. . 2/12/22   Julianne Hong MD   furosemide (LASIX) 20 MG tablet TAKE ONE TABLET BY MOUTH DAILY  Patient not taking: Reported on 7/18/2022 11/17/21   Historical Provider, MD   metoprolol succinate (TOPROL XL) 100 MG extended release tablet Take 1 tablet by mouth daily 1/3/22   Rock Kramer MD   vitamin B-6 (B-6) 50 MG tablet Take 1 tablet by mouth daily 9/12/21   Almas Escobar MD   Vitamin D (CHOLECALCIFEROL) 50 MCG (2000 UT) TABS tablet Take 1 tablet by mouth daily 9/12/21   Almas Escobar MD   zinc sulfate (ZINCATE) 220 (50 Zn) MG capsule Take 1 capsule by mouth daily 9/12/21   Gary Webb MD   ascorbic acid (VITAMIN C) 500 MG tablet Take 1 tablet by mouth 2 times daily 9/11/21   Gary Webb MD   methIMAzole (TAPAZOLE) 5 MG tablet Take 1.5 tablets by mouth daily 1.5 tabs 7/22/21   Bahman Fair MD   colchicine-probenecid 0.5-500 MG per tablet TAKE ONE TABLET BY MOUTH EVERY DAY 10/7/20   Historical Provider, MD   nitroGLYCERIN (NITROSTAT) 0.4 MG SL tablet Place 1 tablet under the tongue every 5 minutes as needed for Chest pain. Patient not taking: Reported on 7/18/2022 10/14/14   Cheryl Rosenbaum MD   allopurinol (ZYLOPRIM) 300 MG tablet Take 300 mg by mouth daily. Historical Provider, MD       Allergies:    Patient has no known allergies. Social History:    reports that he has never smoked. He has never used smokeless tobacco. He reports current alcohol use. He reports that he does not use drugs. Family History:   family history includes Heart Disease in his father and mother. PHYSICAL EXAM:  Vitals:  BP (!) 162/79   Pulse 59   Temp 97.5 °F (36.4 °C) (Oral)   Resp 16   Ht 6' (1.829 m)   Wt 230 lb (104.3 kg)   SpO2 98%   BMI 31.19 kg/m²   GENERAL: No acute distress, Alert and awake, Afebrile, Appears tired and weak otherwise hemodynamically stable at present. HEENT: PERRLA, no icterus. OP clear and no exudates. NECK: Supple  no carotid/ophthalmic bruits, JVD None. RESPIRATORY:  Bilateral equal vesicular breath sound with no wheezing. Lung bases are clear. HEART: No tachycardia at bedside and regular rhythm. Normal S1 and S2, No S3 or S4 is audible. No pulsation, thrills, murmur or friction rubs. ABDOMEN: Soft, nondistended, nontender. No hepatomegaly or splenomegaly. No CVA tenderness on the both sides. Bowel sound is present. EXTREMITIES: All peripheral pulses are present. No calf tenderness or swelling. No pedal edema is present. Ana María Alvarez NEUROLOGY: Alert and awake.  No new focal neuro deficit. Bilateral Pupil is equal and reactive to light. CN-ii-xii otherwise grossly intact. Motor and Sensory: Grossly Intact bilaterally with no new focal signs     LABS:  Recent Labs     07/17/22  1843   WBC 4.3*   RBC 5.40   HGB 17.4*   HCT 53.6   MCV 99.3   MCH 32.2   MCHC 32.5   RDW 14.2   PLT 81*   MPV 11.4     Recent Labs     07/17/22  1843      K 4.5      CO2 28   BUN 23   CREATININE 1.7*   GLUCOSE 80   CALCIUM 9.4     No results for input(s): POCGLU in the last 72 hours.   Results for orders placed or performed during the hospital encounter of 07/17/22   COVID-19, Rapid    Specimen: Nasopharyngeal Swab   Result Value Ref Range    SARS-CoV-2, NAAT Not Detected Not Detected   Comprehensive Metabolic Panel w/ Reflex to MG   Result Value Ref Range    Sodium 142 132 - 146 mmol/L    Potassium reflex Magnesium 4.5 3.5 - 5.0 mmol/L    Chloride 106 98 - 107 mmol/L    CO2 28 22 - 29 mmol/L    Anion Gap 8 7 - 16 mmol/L    Glucose 80 74 - 99 mg/dL    BUN 23 6 - 23 mg/dL    CREATININE 1.7 (H) 0.7 - 1.2 mg/dL    GFR Non-African American 39 >=60 mL/min/1.73    GFR African American 48     Calcium 9.4 8.6 - 10.2 mg/dL    Total Protein 6.5 6.4 - 8.3 g/dL    Albumin 3.8 3.5 - 5.2 g/dL    Total Bilirubin 0.8 0.0 - 1.2 mg/dL    Alkaline Phosphatase 112 40 - 129 U/L    ALT 37 0 - 40 U/L    AST 41 (H) 0 - 39 U/L   CBC with Auto Differential   Result Value Ref Range    WBC 4.3 (L) 4.5 - 11.5 E9/L    RBC 5.40 3.80 - 5.80 E12/L    Hemoglobin 17.4 (H) 12.5 - 16.5 g/dL    Hematocrit 53.6 37.0 - 54.0 %    MCV 99.3 80.0 - 99.9 fL    MCH 32.2 26.0 - 35.0 pg    MCHC 32.5 32.0 - 34.5 %    RDW 14.2 11.5 - 15.0 fL    Platelets 81 (L) 547 - 450 E9/L    MPV 11.4 7.0 - 12.0 fL    Neutrophils % 70.1 43.0 - 80.0 %    Lymphocytes % 13.7 (L) 20.0 - 42.0 %    Monocytes % 6.0 2.0 - 12.0 %    Eosinophils % 9.4 (H) 0.0 - 6.0 %    Basophils % 0.2 0.0 - 2.0 %    Neutrophils Absolute 3.05 1.80 - 7.30 E9/L    Lymphocytes Absolute 0.60 (L) 1.50 - 4.00 E9/L    Monocytes Absolute 0.26 0.10 - 0.95 E9/L    Eosinophils Absolute 0.40 0.05 - 0.50 E9/L    Basophils Absolute 0.00 0.00 - 0.20 E9/L    Metamyelocytes Relative 0.9 0.0 - 1.0 %    Polychromasia 1+     Poikilocytes 1+     Ovalocytes 1+    Troponin   Result Value Ref Range    Troponin, High Sensitivity 83 (H) 0 - 11 ng/L   Platelet Confirmation   Result Value Ref Range    Platelet Confirmation CONFIRMED    Troponin   Result Value Ref Range    Troponin, High Sensitivity 76 (H) 0 - 11 ng/L   SPECIMEN REJECTION   Result Value Ref Range    Rejected Test PT     Reason for Rejection see below    Protime-INR   Result Value Ref Range    Protime 23.6 (H) 9.3 - 12.4 sec    INR 2.0    Troponin   Result Value Ref Range    Troponin, High Sensitivity 74 (H) 0 - 11 ng/L        Radiology: XR CHEST (2 VW)    Result Date: 7/17/2022  EXAMINATION: TWO XRAY VIEWS OF THE CHEST 7/17/2022 6:56 pm COMPARISON: February 10, 2022 HISTORY: ORDERING SYSTEM PROVIDED HISTORY: congestion, fatigue TECHNOLOGIST PROVIDED HISTORY: Reason for exam:->congestion, fatigue FINDINGS: No airspace opacity or pleural effusion. The heart is normal size. No pneumothorax. No free air beneath the hemidiaphragms. No pneumonia or pleural effusion. CT HEAD WO CONTRAST    Result Date: 7/17/2022  EXAMINATION: CT OF THE HEAD WITHOUT CONTRAST  7/17/2022 7:25 pm TECHNIQUE: CT of the head was performed without the administration of intravenous contrast. Automated exposure control, iterative reconstruction, and/or weight based adjustment of the mA/kV was utilized to reduce the radiation dose to as low as reasonably achievable. COMPARISON: February 6, 2022 HISTORY: ORDERING SYSTEM PROVIDED HISTORY: Episode of blurred vision, on coumadin, struck head one day ago, recent ellie hole TECHNOLOGIST PROVIDED HISTORY: Has a \"code stroke\" or \"stroke alert\" been called? ->No Reason for exam:->Episode of blurred vision, on coumadin, struck head one day ago, recent ellie hole Decision Support Exception - unselect if not a suspected or confirmed emergency medical condition->Emergency Medical Condition (MA) FINDINGS: Evidence of right frontal ellie hole craniectomy. Stable minimal dural thickening overlying right frontal lobe. No acute intracranial hemorrhage or edema. Stable cystic lesion in anterior left middle cranial fossa measures 4.2 x 3.1 cm with stable mass effect on left frontal lobe related to stable arachnoid cyst.  Stable prominence of CSF spaces overlying frontal lobes. No hydrocephalus. Paranasal sinuses and mastoid air cells are clear. 1. No acute intracranial hemorrhage or edema. 2. Stable prominence of CSF spaces overlying frontal lobes 3. Stable incidental arachnoid cyst in left middle cranial fossa. ASSESSMENT:    Present on Admission:   Elevated troponin   Chest tightness    PLAN:  # Chest Discomfort to R/O ACS   No ongoing resting chest pain now    Has multiple risk factor   EKG: Showed no specific ST elevation or depression   Initial troponin mildly elevated - will trend 3 sets of troponin and serial EKGs   Statins  and [employer]  on board    Plan for Stress testing, Cardiology consult, and additional cardiac workup as needed in AM   Will monitor telemetry / vitals  # Chronic CHF  Fluid restriction ~1200 ml/day  Continue diuretics  Strict I/O and daily weight  Will adjust and continue home medications as needed  # Hypertension   Currently better controlled  Will resume home medications as needed. Monitor vitals and adjust BP meds as needed  # H/o VTE   Continue warfarin  # h/o Hyperthyroidism   Continue home meds  # Rest of the chronic medical problems are stable and will be managed with appropriately with home medications, placed nursing communication order to verify home medications before giving them to the patient.   # Diet: On PO Diet  # IVF's: No  # Fall Precaution: Yes  # Disposition: Home/primary residence   # Code Status: Full code by default  # DVT Prophylaxis : SC Heparin or SC Lovenox as on chart  The patient at bedside was counseled about clinical status, laboratory/imaging results, diagnoses, medication side effects, risk, and treatment plan, all questions were answered to patient's satisfaction and verbalized understanding      SIGNATURE: Teddy Galeas MD PATIENT NAME: Fausto Arenas   CONTACT #: Hospitalist on call MRN: [id number]     Disclaimer: Portions of this note may have been generated using Dragon voice recognition software. Reasonable efforts were made to correct any dictation errors that resulted due to the programming of this software but some may still be present.

## 2022-07-18 NOTE — CARE COORDINATION
7/18/2022 1256 CM note: Negative covid 7/17/22. Met with patient for transition of care needs. Pt resides with his wife and dtr in a 1 floor home, 4 step entry. He is independent with ADLs and drives. Pt owns but does not use: ww,bsc,cane, shower chair. PCP is Dr Codey Stanley and uses [facility]. Hx [facility], no SNF hx. Plans are for pt to return home at d/c, his wife will provide transportation. Await stress test results.  Seymour POSADAS

## 2022-07-18 NOTE — ACP (ADVANCE CARE PLANNING)
Advance Care Planning   Healthcare Decision Maker:    Primary Decision Maker: Arelijacquelineandria Mcallister Child - [phone]    Secondary Decision Maker: Bahman Oliver - Child - [phone]    Click here to complete Healthcare Decision Makers including selection of the Healthcare Decision Maker Relationship (ie \"Primary\").

## 2022-07-18 NOTE — PROGRESS NOTES
Pharmacy Consultation Note  (Warfarin Dosing and Monitoring)    Initial consult date: 7/18/22  Consulting Provider: Dr Odalis Nixon is a 68 y.o. male for whom pharmacy has been asked to manage warfarin therapy. Weight:   Wt Readings from Last 1 Encounters:   07/17/22 230 lb (104.3 kg)         Warfarin Indication Target   INR Range Home Dose  (if applicable) Diet/Feeding Tube   (Enteral feeds, nutritional drinks, increased Vitamin K in diet can decrease INR)   History of  VTE/PE 2 - 3 N/a Diet NPO  ADULT DIET; Regular; Low Fat/Low Chol/High Fiber/2 gm Na; Low Sodium (2 gm); 1800 ml       Comments regarding medication interactions:  x Home Med? Meds Increasing INR x Home Med?  Meds Decreasing INR   X Yes Allopurinol    Azathioprine     Amiodarone/Propafenone/Dronedarone   Carbamazepine     Androgens   Cholestyramine     Chemotherapy (BBW: Capecitabine)   Estrogen     Ciprofloxacin/Levofloxacin   Nafcillin/Dicloxacillin     Clarithromycin/Erythromycin/Azithromycin   Barbiturates      Fluconazole/Itraconazole/Voriconazole/Ketoconazole   Phenytoin (Variable)     Metronidazole   Rifampin     Phenytoin (Variable)   Steroids (Variable/Dose Dependent)   X Yes  Statins/Fenofibrate/Gemfibrozil   Sucralfate     Steroids (Variable/Dose Dependent) X Yes Other: Methimazole     Sulfamethoxazole/Trimethoprim        Tramadol         Other:          x Diseases Affecting INR x Increased Bleeding Risk    CHF Exacerbation (Increases)  History GI Bleed/PUD    Liver Disease (Increases)  Chronic NSAID Use    Thyroid: Hyper (Increases)  Hypo (Decreases)  Chronic ASA/Antiplatelet Use (Clopidogrel/ Dipyridamole/Prasugrel/Ticagrelor)      Malignancy (Increases)  Abnormal Renal Function (dialysis, renal transplant, SCr ? 2.3 mg/dL)     History of EtOH Abuse: Acute (Increases)   Chronic (Decreases)  Liver Function (cirrhosis, bilirubin >2x ULN with AST/ALT/AP >3x ULN)    Fever (Increases) X Age > 65 years    Acute infection (Increases) Hypertension/Uncontrolled BP    Diarrhea/Dehydration (Increases)  History of stroke    Other: __________________  Other:___________________     Vitamin K or Blood product  Administration Date                    TSH:    Lab Results   Component Value Date/Time    TSH 1.730 07/18/2022 12:15 PM        Hepatic Function Panel:                            Lab Results   Component Value Date/Time    ALKPHOS 106 07/18/2022 12:15 PM    ALT 34 07/18/2022 12:15 PM    AST 36 07/18/2022 12:15 PM    PROT 6.1 07/18/2022 12:15 PM    BILITOT 0.9 07/18/2022 12:15 PM    BILIDIR 0.3 09/06/2021 09:40 AM    IBILI 0.5 09/06/2021 09:40 AM    LABALBU 3.8 07/18/2022 12:15 PM    LABALBU 4.1 02/10/2012 10:45 AM       Recent Labs     07/17/22  1843 07/18/22  0703   HGB 17.4* 17.4*   PLT 81* 70*       Date Warfarin Dose INR Heparin or LMWH Comment   7/18 2 mg 1.9 --                                  Assessment:  Patient is a 68 y.o. male on warfarin for History of  VTE/PE  Goal INR 2 - 3  INR 1.9 today    Plan:  S/p warfarin 2mg this morning  Daily PT/INR until the INR is stable within the therapeutic range  Pharmacist will follow and monitor/adjust dosing as necessary    Thank you for this consult,    Crystal Long, PharmD 7/18/2022 3:57 PM   [phone]

## 2022-07-19 VITALS
SYSTOLIC BLOOD PRESSURE: 160 MMHG | WEIGHT: 230 LBS | HEIGHT: 72 IN | DIASTOLIC BLOOD PRESSURE: 88 MMHG | HEART RATE: 55 BPM | OXYGEN SATURATION: 96 % | TEMPERATURE: 97 F | BODY MASS INDEX: 31.15 KG/M2 | RESPIRATION RATE: 17 BRPM

## 2022-07-19 LAB
INR BLD: 1.7
PROTHROMBIN TIME: 19.2 SEC (ref 9.3–12.4)

## 2022-07-19 PROCEDURE — 6370000000 HC RX 637 (ALT 250 FOR IP): Performed by: INTERNAL MEDICINE

## 2022-07-19 PROCEDURE — 2580000003 HC RX 258: Performed by: INTERNAL MEDICINE

## 2022-07-19 PROCEDURE — G0378 HOSPITAL OBSERVATION PER HR: HCPCS

## 2022-07-19 PROCEDURE — 99214 OFFICE O/P EST MOD 30 MIN: CPT | Performed by: INTERNAL MEDICINE

## 2022-07-19 PROCEDURE — 36415 COLL VENOUS BLD VENIPUNCTURE: CPT

## 2022-07-19 PROCEDURE — 85610 PROTHROMBIN TIME: CPT

## 2022-07-19 RX ORDER — WARFARIN SODIUM 5 MG/1
6 TABLET ORAL DAILY
Qty: 30 TABLET | Refills: 0 | Status: SHIPPED | OUTPATIENT
Start: 2022-07-19 | End: 2022-08-11

## 2022-07-19 RX ORDER — WARFARIN SODIUM 5 MG/1
2 TABLET ORAL DAILY
Qty: 30 TABLET | Refills: 0 | Status: CANCELLED
Start: 2022-07-19

## 2022-07-19 RX ORDER — WARFARIN SODIUM 6 MG/1
6 TABLET ORAL
Status: DISCONTINUED | OUTPATIENT
Start: 2022-07-19 | End: 2022-07-19

## 2022-07-19 RX ORDER — WARFARIN SODIUM 6 MG/1
6 TABLET ORAL
Status: COMPLETED | OUTPATIENT
Start: 2022-07-19 | End: 2022-07-19

## 2022-07-19 RX ADMIN — OXYCODONE HYDROCHLORIDE AND ACETAMINOPHEN 500 MG: 500 TABLET ORAL at 09:35

## 2022-07-19 RX ADMIN — ALLOPURINOL 300 MG: 300 TABLET ORAL at 09:26

## 2022-07-19 RX ADMIN — LEVETIRACETAM 500 MG: 500 TABLET, FILM COATED ORAL at 09:25

## 2022-07-19 RX ADMIN — FUROSEMIDE 20 MG: 20 TABLET ORAL at 09:25

## 2022-07-19 RX ADMIN — WARFARIN SODIUM 6 MG: 6 TABLET ORAL at 13:37

## 2022-07-19 RX ADMIN — METHIMAZOLE 7.5 MG: 5 TABLET ORAL at 09:27

## 2022-07-19 RX ADMIN — SODIUM CHLORIDE, PRESERVATIVE FREE 10 ML: 5 INJECTION INTRAVENOUS at 09:35

## 2022-07-19 RX ADMIN — ZINC SULFATE 220 MG (50 MG) CAPSULE 50 MG: CAPSULE at 09:24

## 2022-07-19 RX ADMIN — ATORVASTATIN CALCIUM 20 MG: 20 TABLET, FILM COATED ORAL at 09:26

## 2022-07-19 RX ADMIN — PYRIDOXINE HCL TAB 50 MG 50 MG: 50 TAB at 09:26

## 2022-07-19 RX ADMIN — PROBENECID AND COLCHICINE 1 TABLET: 500; .5 TABLET ORAL at 09:26

## 2022-07-19 RX ADMIN — Medication 2000 UNITS: at 09:26

## 2022-07-19 RX ADMIN — METOPROLOL SUCCINATE 100 MG: 100 TABLET, FILM COATED, EXTENDED RELEASE ORAL at 09:25

## 2022-07-19 RX ADMIN — WARFARIN SODIUM 2 MG: 2 TABLET ORAL at 09:25

## 2022-07-19 ASSESSMENT — PAIN SCALES - GENERAL: PAINLEVEL_OUTOF10: 0

## 2022-07-19 NOTE — PLAN OF CARE
Problem: Safety - Adult  Goal: Free from fall injury  7/19/2022 0232 by Nav Del Valle RN  Outcome: Progressing Towards Goal     Problem: ABCDS Injury Assessment  Goal: Absence of physical injury  7/19/2022 0232 by Nav Del Valle RN  Outcome: Progressing Towards Goal

## 2022-07-19 NOTE — DISCHARGE SUMMARY
[facility] Physician Discharge Summary       Heather Phlegm, APRN - CNP  [address]  [phone]    Follow up      [address] [facility] [address]  [phone]    Follow up      Activity level: Slowly increase as tolerated    Diet: ADULT DIET; Regular; Low Fat/Low Chol/High Fiber/2 gm Na; Low Sodium (2 gm); 1800 ml    Labs: None are pending at the discharge    Condition at discharge: Stable    Dispo: Return to home setting       Patient ID:  Kalpana Berger  [id number]  68 y.o.  1945    Admit date: 7/17/2022    Discharge date and time:  7/19/2022  1:38 PM    Admission Diagnoses: Principal Problem:    Elevated troponin  Active Problems:    Chest tightness    Congestion of nasal sinus    NSTEMI (non-ST elevated myocardial infarction) ([address]    Hyperthyroidism    Acute deep vein thrombosis (DVT) of femoral vein of left lower extremity ([address]    Primary hypertension  Resolved Problems:    * No resolved hospital problems. *      Discharge Diagnoses: Principal Problem:    Elevated troponin  Active Problems:    Chest tightness    Congestion of nasal sinus    NSTEMI (non-ST elevated myocardial infarction) (HCC)    Hyperthyroidism    Acute deep vein thrombosis (DVT) of femoral vein of left lower extremity ([address]    Primary hypertension  Resolved Problems:    * No resolved hospital problems. *      Consults:  PHARMACY TO DOSE WARFARIN  IP CONSULT TO CARDIOLOGY    Procedures: None significant except if described in hospital course.     Hospital Course:   History of present illness from History and Physical:  This is a 68 y.o. male  has a past medical history of Bilateral renal cysts, CAD (coronary artery disease), Cardiomyopathy ([address] Cerebral artery occlusion with cerebral infarction [facility]), Chest pain, Hemorrhagic stroke ([facility] History of gout, History of pulmonary embolism, Hyperlipidemia, Hypertension, Hyperthyroidism, NSTEMI (non-ST elevated myocardial infarction) ([facility] Obesity due to excess calories, Thyroid cyst, and Thyroid disease. presented with chest discomfor  for last few days prior to arrival to the hospital. Initially chest discomfort was mild, intermittent but gradually getting more persistent. Started gradually. Exacerbated by general activity or exertion. Relieved only partially by rest.  The patient was seen and examined at bedside, appears alert and awake with no acute distress and is able to answer simple  questions. On direct questioning, patient denied any  resting ongoing chest pain, resting SOB, hemoptysis, productive cough, fever, ongoing palpitation, active abdominal pain, hematemesis, rectal bleeding, woody, hematuria, any other  and GI complaints, and any new focal neuro deficits. This is not same as ed note where hpi states: This is a 68year old male with a PMH of PE and CAD who presents to the ED for evlauation of fatigue. Patient states taht today he was sitting outside when he suddently had an episode where he felt as though he was getting nasal congestion, brief double vision and extreme fatigue. He states that he has no chest pain or shortness of breath. Patient states that he then went and slept for about 5 hours which is uncommon for him. Patient states that he woke up here into the ER for further evaluation. States he has been compliant with his medications. States that he is on Coumadin for PEs. He has no speech difficulty or facial droop. No reported traumas today although he states he did bump his head about 2 days ago. No abdominal pain or nausea    I1.  dicussed these differences with cardiology  Multiple risk factor including known h/o cad  EKG: Showed no specific ST elevation or depression      Initial troponin 83-->76--> 74  Continue Statins  and Betablocker. Stress test 9-0. Ok FOR D/C per Cardiology     2.  Hypertension  Currently better controlled  Will resume home 99.1   MCH 32.2 31.8   MCHC 32.5 32.0   RDW 14.2 14.2   PLT 81* 70*   MPV 11.4 11.8       No results for input(s): POCGLU in the last 72 hours. Imaging:  XR CHEST (2 VW)    Result Date: 7/17/2022  EXAMINATION: TWO XRAY VIEWS OF THE CHEST 7/17/2022 6:56 pm COMPARISON: February 10, 2022 HISTORY: ORDERING SYSTEM PROVIDED HISTORY: congestion, fatigue TECHNOLOGIST PROVIDED HISTORY: Reason for exam:->congestion, fatigue FINDINGS: No airspace opacity or pleural effusion. The heart is normal size. No pneumothorax. No free air beneath the hemidiaphragms. No pneumonia or pleural effusion. CT HEAD WO CONTRAST    Result Date: 7/17/2022  EXAMINATION: CT OF THE HEAD WITHOUT CONTRAST  7/17/2022 7:25 pm TECHNIQUE: CT of the head was performed without the administration of intravenous contrast. Automated exposure control, iterative reconstruction, and/or weight based adjustment of the mA/kV was utilized to reduce the radiation dose to as low as reasonably achievable. COMPARISON: February 6, 2022 HISTORY: ORDERING SYSTEM PROVIDED HISTORY: Episode of blurred vision, on coumadin, struck head one day ago, recent ellie hole TECHNOLOGIST PROVIDED HISTORY: Has a \"code stroke\" or \"stroke alert\" been called? ->No Reason for exam:->Episode of blurred vision, on coumadin, struck head one day ago, recent ellie hole Decision Support Exception - unselect if not a suspected or confirmed emergency medical condition->Emergency Medical Condition (MA) FINDINGS: Evidence of right frontal ellie hole craniectomy. Stable minimal dural thickening overlying right frontal lobe. No acute intracranial hemorrhage or edema. Stable cystic lesion in anterior left middle cranial fossa measures 4.2 x 3.1 cm with stable mass effect on left frontal lobe related to stable arachnoid cyst.  Stable prominence of CSF spaces overlying frontal lobes. No hydrocephalus. Paranasal sinuses and mastoid air cells are clear.      1. No acute intracranial hemorrhage or edema. 2. Stable prominence of CSF spaces overlying frontal lobes 3. Stable incidental arachnoid cyst in left middle cranial fossa. NM Cardiac Stress Test Nuclear Imaging    Result Date: 7/18/2022  Indication:  Chest pain IMAGING: Myocardial perfusion imaging was performed at rest 30-35 minutes following the intravenous injection of 9.1 mCi of (Tc-Sestamibi) followed by 10 ml of Normal Saline. At peak exercise, the patient was injected intravenously with 31.9 mCi of (Tc-Sestamibi) followed by 10 ml of Normal Saline. Gated post-stress tomographic imaging was performed 20-25 minutes after stress. FINDINGS: The overall quality of the study was good. Left ventricular cavity size was noted to be normal. Rotational analog analysis demonstrated soft tissue attenuation. Large-sized fixed perfusion defect in the inferior wall (perfusion defect more pronounced on rest imaging; normal wall motion and wall thickening). Gated SPECT left ventricular ejection fraction was calculated to be 68% with normal myocardial wall motion. 1. The myocardial perfusion imaging was normal with soft tissue attenuation. 2. Gated SPECT left ventricular ejection fraction was calculated to be 68% with normal myocardial wall motion. Arnulfo Blake MD        Patient Instructions:   Current Discharge Medication List        CONTINUE these medications which have NOT CHANGED    Details   atorvastatin (LIPITOR) 20 MG tablet TAKE ONE TABLET BY MOUTH EVERY DAY      levETIRAcetam (KEPPRA) 500 MG tablet Take 1 tablet by mouth 2 times daily  Qty: 60 tablet, Refills: 5      warfarin (COUMADIN) 5 MG tablet Take 1 tablet by mouth daily Take 2 mg SAT, SUN, TUE, THURS    Take 3mg MON, WED, FRI    INR weekly with PCP and Adjust as needed.   Qty: 30 tablet, Refills: 3      furosemide (LASIX) 20 MG tablet TAKE ONE TABLET BY MOUTH DAILY      metoprolol succinate (TOPROL XL) 100 MG extended release tablet Take 1 tablet by mouth daily  Qty: 90 tablet, Refills: 3 vitamin B-6 (B-6) 50 MG tablet Take 1 tablet by mouth daily  Qty: 30 tablet, Refills: 0      Vitamin D (CHOLECALCIFEROL) 50 MCG (2000 UT) TABS tablet Take 1 tablet by mouth daily  Qty: 60 tablet, Refills: 0    Comments: Labeling may look different. 25 mcg=1000 Units. Please double check dosages. zinc sulfate (ZINCATE) 220 (50 Zn) MG capsule Take 1 capsule by mouth daily  Qty: 30 capsule, Refills: 3      ascorbic acid (VITAMIN C) 500 MG tablet Take 1 tablet by mouth 2 times daily  Qty: 30 tablet, Refills: 0      methIMAzole (TAPAZOLE) 5 MG tablet Take 1.5 tablets by mouth daily 1.5 tabs  Qty: 135 tablet, Refills: 3    Associated Diagnoses: Hyperthyroidism      colchicine-probenecid 0.5-500 MG per tablet TAKE ONE TABLET BY MOUTH EVERY DAY      nitroGLYCERIN (NITROSTAT) 0.4 MG SL tablet Place 1 tablet under the tongue every 5 minutes as needed for Chest pain. Qty: 25 tablet, Refills: 3      allopurinol (ZYLOPRIM) 300 MG tablet Take 300 mg by mouth daily. Note that more than 30 minutes was spent in preparing discharge papers, discussing discharge with patient, medication review, etc.    NOTE: This report was transcribed using voice recognition software. Every effort was made to ensure accuracy; however, inadvertent computerized transcription errors may be present.      Signed:  Electronically signed by Millie Gavin MD on 7/19/2022 at 1:38 PM

## 2022-07-19 NOTE — CARE COORDINATION
7/19/2022 1140 CM note: Negative covid 7/17/22. Plans are for pt to return home at d/c, his wife will provide transportation.  Seymour POSADAS normal S1, S2 heard

## 2022-07-19 NOTE — PROGRESS NOTES
[facility]) Physicians        CARDIOLOGY                 INPATIENT PROGRESS NOTE          PATIENT SEEN IN FOLLOW UP FOR: Elevated Troponin    Hospital Day: 3     Ronnell Armijo is a 68year old patient known to Dr. Zack Garcia: Denies any CP or dizzy, No palpitations; Ambulating    ROS: Review of rest of 8 systems negative except as mentioned above    OBJECTIVE: No acute distress. See Assessment     Diagnostics:       Telemetry:  Reviewed    Stress test 7/18/2022  Impression   1. The myocardial perfusion imaging was normal with soft tissue   attenuation. 2. Gated SPECT left ventricular ejection fraction was calculated to be   68% with normal myocardial wall motion. Intake/Output Summary (Last 24 hours) at 7/19/2022 1204  Last data filed at 7/19/2022 0845  Gross per 24 hour   Intake 360 ml   Output 0 ml   Net 360 ml       Labs:   CBC:   Recent Labs     07/17/22  1843 07/18/22  0703   WBC 4.3* 4.4*   HGB 17.4* 17.4*   HCT 53.6 54.3*   PLT 81* 70*     BMP:   Recent Labs     07/18/22  0703 07/18/22  1215    139   K 4.1 4.0   CO2 25 26   BUN 21 20   CREATININE 1.4* 1.3*   LABGLOM 49 54   CALCIUM 9.4 9.1     Mag: No results for input(s): MG in the last 72 hours. Phos: No results for input(s): PHOS in the last 72 hours. TSH:   Recent Labs     07/18/22  1215   TSH 1.730     HgA1c:     BNP: No results for input(s): BNP in the last 72 hours. PT/INR:   Recent Labs     07/18/22  1215 07/19/22  0639   PROTIME 21.5* 19.2*   INR 1.9 1.7     APTT:No results for input(s): APTT in the last 72 hours.   CARDIAC ENZYMES:  Recent Labs     07/18/22  1215   CKTOTAL 96     FASTING LIPID PANEL:  Lab Results   Component Value Date/Time    CHOL 168 08/04/2016 06:27 AM    HDL 51 07/18/2022 12:15 PM    LDLCALC 107 07/18/2022 12:15 PM    TRIG 187 08/04/2016 06:27 AM     LIVER PROFILE:  Recent Labs     07/18/22  0703 07/18/22  1215   AST 38 36   ALT 36 34   LABALBU 3.6 3.8       Current Inpatient Medications:   allopurinol  300 mg Oral Daily    colchicine-probenecid  1 tablet Oral Daily    methIMAzole  7.5 mg Oral Daily    pyridoxine  50 mg Oral Daily    Vitamin D  2,000 Units Oral Daily    zinc sulfate  50 mg Oral Daily    ascorbic acid  500 mg Oral BID    furosemide  20 mg Oral Daily    metoprolol succinate  100 mg Oral Daily    warfarin  2 mg Oral Daily    levETIRAcetam  500 mg Oral BID    atorvastatin  20 mg Oral Daily    sodium chloride flush  10 mL IntraVENous 2 times per day       IV Infusions (if any):   sodium chloride           PHYSICAL EXAM:     CONSTITUTIONAL:   BP (!) 160/88   Pulse 63   Temp 97 °F (36.1 °C) (Oral)   Resp 17   Ht 6' (1.829 m)   Wt 230 lb (104.3 kg)   SpO2 96%   BMI 31.19 kg/m²   Pulse  Av  Min: 55  Max: 65  Systolic (79PNK), IQF:527 , Min:148 , EEM:827    Diastolic (33QYV), NMY:06, Min:87, Max:88        CONST:  Well developed, appears stated age. Awake, alert and no apparent distress. HEENT:   Head- Normocephalic  Eyes- Conjunctivae pink, no icterus  Throat- Oral mucosa moist  Neck-  No stridor, no jugular venous distention. No carotid bruit. CHEST: Chest symmetrical and non-tender to palpation. No accessory muscle use  RESPIRATORY: Lung sounds - Few rhonchi  CARDIOVASCULAR:     Heart Palpation- No thrills. Heart Ausculation- Regular rate and rhythm, 1/6 systolic murmur. No s3 or rub  EXT: No lower extremity edema. Distal pulses palpable, no cyanosis  ABDOMEN: Soft, non-tender to light palpation. Bowel sounds present. No abdominal bruit  MS: Good muscle strength    : Deferred  RECTAL: Deferred  SKIN: Warm and dry  NEURO / PSYCH: Oriented to person, place; Good mood and affect. IMPRESSION / RECOMMENDATIONS:     Elevated troponin - No CP; Hx of Troponin elevation, Likely from CKD. EKG reviewed, Nonischemic Lexiscan stress    Transient double vision - per Dr Phuc Miner; Resolved, No josey or tachyarrhythmias; No tingling/numbness     CAD s/p PCI - . Continue BB, Statin, No ASA due to Coumadin     Hx of DVT left lower extremity (HCC) - s/p IVC filter  Fen 2022 - On Coumadin, Per Dr Greta Anand     Hx of Bradycardia - Asymptomatic, Decrease Metoprolol to 50 mg if HR <50; If dizzy or syncope, need Event monitor/EP referral     CKD     Primary hypertension     Hx of subdural hematoma s/p right frontal ellie hole                D/w Dr Greta Anand     Cardiology will sign off  F/u by Dr Micheline Gamino 1 month    Above recommendations discussed with him.       Electronically signed by Santa Diaz MD on 7/19/2022 at 12:04 PM  [facility]) Cardiology

## 2022-07-19 NOTE — PROGRESS NOTES
Hypertension/Uncontrolled BP    Diarrhea/Dehydration (Increases)  History of stroke    Other: __________________  Other:___________________     Vitamin K or Blood product  Administration Date                    TSH:    Lab Results   Component Value Date/Time    TSH 1.730 07/18/2022 12:15 PM        Hepatic Function Panel:                            Lab Results   Component Value Date/Time    ALKPHOS 106 07/18/2022 12:15 PM    ALT 34 07/18/2022 12:15 PM    AST 36 07/18/2022 12:15 PM    PROT 6.1 07/18/2022 12:15 PM    BILITOT 0.9 07/18/2022 12:15 PM    BILIDIR 0.3 09/06/2021 09:40 AM    IBILI 0.5 09/06/2021 09:40 AM    LABALBU 3.8 07/18/2022 12:15 PM    LABALBU 4.1 02/10/2012 10:45 AM       Recent Labs     07/17/22  1843 07/18/22  0703   HGB 17.4* 17.4*   PLT 81* 70*         Date Warfarin Dose INR Heparin or LMWH Comment   7/18 2 mg 1.9 --    7/19 8 mg 1.7 --                           Assessment:  Patient is a 68 y.o. male on warfarin for History of  VTE/PE  Goal INR 2 - 3  INR 1.9 today    Plan:  Patient was ordered an extra 6 mg warfarin bolus this morning by hospitalist prior to discharge - total dose received this morning was 8 mg  Daily PT/INR until the INR is stable within the therapeutic range  Pharmacist will follow and monitor/adjust dosing as necessary    Thank you for this consult,    Rachid Miguel, PharmD 7/19/2022 5:29 PM   [phone]

## 2022-07-19 NOTE — PROGRESS NOTES
Called 3 times to make an appointment each time the phone was busy. Electronically signed by Aneesh Mari on 7/19/22 at 3:15 PM EDT

## 2022-07-20 ENCOUNTER — CARE COORDINATION (OUTPATIENT)
Dept: CASE MANAGEMENT | Age: 77
End: 2022-07-20

## 2022-07-20 NOTE — CARE COORDINATION
[facility] Transitions Initial Follow Up Call    Call within 2 business days of discharge: Yes    Patient: Lin Dorsey Patient : 1945   MRN: [id number]  Reason for Admission: Congestion of Nasal Sinus; Elevated Troponin  Discharge Date: 22 RARS: Readmission Risk Score: 12.5 ( )      Last Discharge [facility]       Date Complaint Diagnosis Description Type Department Provider    22 Fatigue; Blurred Vision Congestion of nasal sinus . .. ED to Hosp-Admission (Discharged) (ADMITTED) [facility] 4 Pb Matos MD; Felipe Fair. .. Attempted to reach patient by phone for initial post hospital discharge follow up. Unable to reach patient; no answer, no voice mailbox set up. Attempted to reach [facility], by phone regarding hospital discharge follow up. HIPAA compliant message left voicemail; CTN contact information provided.              Follow Up  Future Appointments   Date Time Provider Santos Orantes   11/15/2022 11:15 AM Kamlesh Keller MD [id number] Meyer RN

## 2022-07-21 ENCOUNTER — CARE COORDINATION (OUTPATIENT)
Dept: CASE MANAGEMENT | Age: 77
End: 2022-07-21

## 2022-07-22 ENCOUNTER — TELEPHONE (OUTPATIENT)
Dept: CARDIOLOGY CLINIC | Age: 77
End: 2022-07-22

## 2022-07-22 NOTE — TELEPHONE ENCOUNTER
Called and left message for patient to call back and schedule his 2-3 week hf/u appointment with Dr. Vince Harrell the week of Audust 8th.

## 2022-08-11 ENCOUNTER — OFFICE VISIT (OUTPATIENT)
Dept: CARDIOLOGY CLINIC | Age: 77
End: 2022-08-11
Payer: MEDICARE

## 2022-08-11 VITALS
BODY MASS INDEX: 31.83 KG/M2 | RESPIRATION RATE: 16 BRPM | HEIGHT: 72 IN | DIASTOLIC BLOOD PRESSURE: 74 MMHG | WEIGHT: 235 LBS | HEART RATE: 56 BPM | SYSTOLIC BLOOD PRESSURE: 130 MMHG

## 2022-08-11 DIAGNOSIS — I25.10 CORONARY ARTERY DISEASE INVOLVING NATIVE CORONARY ARTERY OF NATIVE HEART WITHOUT ANGINA PECTORIS: Primary | ICD-10-CM

## 2022-08-11 PROCEDURE — 93000 ELECTROCARDIOGRAM COMPLETE: CPT | Performed by: INTERNAL MEDICINE

## 2022-08-11 PROCEDURE — 1124F ACP DISCUSS-NO DSCNMKR DOCD: CPT | Performed by: INTERNAL MEDICINE

## 2022-08-11 PROCEDURE — 99214 OFFICE O/P EST MOD 30 MIN: CPT | Performed by: INTERNAL MEDICINE

## 2022-08-11 NOTE — PROGRESS NOTES
[facility] Cardiology Progress Note  Dr. Jeannette Zafar      Referring Physician: Ana Torres, APRN - CNP  CHIEF COMPLAINT:   Chief Complaint   Patient presents with    Coronary Artery Disease       HISTORY OF PRESENT ILLNESS:   Patient  is 68years old male with PCI to LAD, HTN, hyperlipidemia, thyroid disease, and pulmonary embolism after back surgery is here for follow up appointment. Patient denies any chest pain, no shortness of breath, no lightheadedness, no dizziness, no palpitations, no pedal edema, no PND, no orthopnea, no syncope, no presyncopal episodes.   History of traumatic subdural hematoma  Functional capacity is good for age     Past Medical History:   Diagnosis Date    Bilateral renal cysts 10/13/2014    CAD (coronary artery disease)     Cardiomyopathy (HCC)     Cerebral artery occlusion with cerebral infarction [facility])     Chest pain     8-4-2016 lexiscan stress    Hemorrhagic stroke ([facility] [address]     History of gout 10/13/2014    History of pulmonary embolism 10/13/2014    Hyperlipidemia     Hypertension     Hyperthyroidism 10/13/2014    NSTEMI (non-ST elevated myocardial infarction) ([facility] [address] 10/13/2014    Obesity due to excess calories     Thyroid cyst 10/13/2014    Thyroid disease          Past Surgical History:   Procedure Laterality Date    BACK SURGERY      CARDIOVASCULAR STRESS TEST N/A 07/18/2022    lexiscan stress test    CORONARY ANGIOPLASTY WITH STENT PLACEMENT  10/13/2014    Dr. Jodie Flores- Mona Patel 3.0x18 MID LAD    CRANIOTOMY Right 06/25/2021    RIGHT FRONTAL REMINGTON HOLE TO DRAIN HEMATOMA AND PLACEMENT OF SUBDURAL DRAIN performed by Roselyn Biswas MD at [place] [address] CATH LAB PROCEDURE  10/13/2014    Dr. Jodie lFores: PCI to LAD    IR IVC FILTER PLACEMENT W IMAGING  02/06/2022    IR IVC FILTER PLACEMENT W IMAGING 2/6/2022 [facility] PROC[facility]         Current Outpatient Medications   Medication Sig Dispense Refill    Apixaban (ELIQUIS PO) Take by mouth in the morning and at bedtime Patient unsure of dose      atorvastatin (LIPITOR) 20 MG tablet TAKE ONE TABLET BY MOUTH EVERY DAY      levETIRAcetam (KEPPRA) 500 MG tablet Take 1 tablet by mouth 2 times daily 60 tablet 5    metoprolol succinate (TOPROL XL) 100 MG extended release tablet Take 1 tablet by mouth daily 90 tablet 3    vitamin B-6 (B-6) 50 MG tablet Take 1 tablet by mouth daily 30 tablet 0    Vitamin D (CHOLECALCIFEROL) 50 MCG (2000 UT) TABS tablet Take 1 tablet by mouth daily 60 tablet 0    zinc sulfate (ZINCATE) 220 (50 Zn) MG capsule Take 1 capsule by mouth daily 30 capsule 3    ascorbic acid (VITAMIN C) 500 MG tablet Take 1 tablet by mouth 2 times daily 30 tablet 0    methIMAzole (TAPAZOLE) 5 MG tablet Take 1.5 tablets by mouth daily 1.5 tabs 135 tablet 3    colchicine-probenecid 0.5-500 MG per tablet TAKE ONE TABLET BY MOUTH EVERY DAY      nitroGLYCERIN (NITROSTAT) 0.4 MG SL tablet Place 1 tablet under the tongue every 5 minutes as needed for Chest pain. 25 tablet 3    allopurinol (ZYLOPRIM) 300 MG tablet Take 300 mg by mouth daily. warfarin (COUMADIN) 5 MG tablet Take 1 tablet by mouth in the morning. Take 6 mg SUN, TUE, THURS, SAT    Take 3mg MON, WED, FRI    INR weekly with PCP and Adjust as needed. . (Patient not taking: Reported on 8/11/2022) 30 tablet 0    furosemide (LASIX) 20 MG tablet TAKE ONE TABLET BY MOUTH DAILY       No current facility-administered medications for this visit.          Allergies as of 08/11/2022    (No Known Allergies)       Social History     Socioeconomic History    Marital status:      Spouse name: Not on file    Number of children: Not on file    Years of education: Not on file    Highest education level: Not on file   Occupational History    Not on file   Tobacco Use    Smoking status: Never    Smokeless tobacco: Never   Vaping Use    Vaping Use: Never used   Substance and Sexual Activity    Alcohol use: Yes     Comment: occasional. No caffeine    Drug use: No    Sexual activity: Not on file   Other Topics Concern    Not on file   Social History Narrative    Not on file     Social Determinants of Health     Financial Resource Strain: Not on file   Food Insecurity: Not on file   Transportation Needs: Not on file   Physical Activity: Not on file   Stress: Not on file   Social Connections: Not on file   Intimate Partner Violence: Not on file   Housing Stability: Not on file       Family History   Problem Relation Age of Onset    Heart Disease Mother         cabg 3    Heart Disease Father         pacemaker       REVIEW OF SYSTEMS:   CONSTITUTIONAL:  negative for  fevers, chills, sweats and fatigue  HEENT:  negative for  tinnitus, earaches, nasal congestion and epistaxis  RESPIRATORY:  negative for  dry cough, cough with sputum, dyspnea, wheezing and hemoptysis  GASTROINTESTINAL:  negative for nausea, vomiting, diarrhea, constipation, pruritus and jaundice  HEMATOLOGIC/LYMPHATIC:  negative for easy bruising, bleeding, lymphadenopathy and petechiae  ENDOCRINE:  negative for heat intolerance, cold intolerance, tremor, hair loss and diabetic symptoms including neither polyuria nor polydipsia nor blurred vision  MUSCULOSKELETAL:  negative for  myalgias, arthralgias, joint swelling, stiff joints and decreased range of motion  NEUROLOGICAL:  negative for memory problems, speech problems, visual disturbance, dysphagia, weakness and numbness      PHYSICAL EXAM:   Constitutional:  Awake, alert cooperative, no apparent distress, and appears stated age. HEENT:  Moist and pink mucous membranes, normocephalic, without obvious abnormality, atraumatic, normal ears and nose.   Neck:  Supple, symmetrical, trachea midline, no JVD, no adenopathy, thyroid symmetric, not enlarged and no tenderness, good carotid upstroke bilaterally, no carotid bruit, skin normal  Lungs: No increased work of breathing, decreased air exchange, bilateral wheezing  Cardiovascular: Normal apical impulse, regular rate and rhythm, normal S1 and S2, no S3 or S4, no murmur, no pedal edema, good carotid upstroke bilaterally, no carotid bruit, no JVD, no abdominal pulsating masses. Abdomen: Soft, nontender, no hepatomegaly, no splenomegaly, bowel sound positive. CHEST:  Expands symmetrically, nontender to palpation. Musculoskeletal:  No clubbing or cyanosis. No redness, warmth, or swelling of the joints. Neurological: Alert, awake, and oriented X3. /74   Pulse 56   Resp 16   Ht 6' (1.829 m)   Wt 235 lb (106.6 kg)   BMI 31.87 kg/m²     DATA:   I personally reviewed the visit EKG with the following interpretation:sinus rhythm, first-degree AV block, normal axis    EKG 1/3/2022, sinus rhythm, first-degree AV block, poor R wave progression, normal axis    EKG - 9/6/21 Sinus rhythm with 1st degree AV block  Otherwise normal ECG  When compared with ECG of 04-SEP-2021 16:00,  No significant change was found    ECHO: 11/16/15 Normal left ventricular systolic function Stage I diastolic dysfunction EF 59% Mild mitral regurgitation Trace tricuspid regurgitation    Stress Test: 8/4/16   Technique:    The patient received 12.97 mCi of Cardiolite for the resting study,    followed by standard SPECT imaging . The patient was stressed    pharmacologically. Then, 35.1 mCi of Cardiolite was injected with    repeat comparison imaging. Findings:    Stress induced ischemia is not demonstrated. Angiography: 10/13/14 IMPRESSION:   1. Subtotal occlusion of mid LAD with successful balloon angioplasty and      deployment of 3.0 x 18 mm Expedition drug-eluting stent with 0% residual      stenosis and mild pinching of the ostium of the first diagonal branch      estimated at 20% to 30%.    2.    Successful deployment of 8-Irish Angio-Seal in the right common      femoral artery  Cardiology Labs: BMP:    Lab Results   Component Value Date/Time     07/18/2022 12:15 PM    K 4.0 07/18/2022 12:15 PM     07/18/2022 12:15 PM    CO2 26 07/18/2022 12:15 PM    BUN 20 07/18/2022 12:15 PM    CREATININE 1.3 07/18/2022 12:15 PM     CMP:    Lab Results   Component Value Date/Time     07/18/2022 12:15 PM    K 4.0 07/18/2022 12:15 PM     07/18/2022 12:15 PM    CO2 26 07/18/2022 12:15 PM    BUN 20 07/18/2022 12:15 PM    CREATININE 1.3 07/18/2022 12:15 PM    PROT 6.1 07/18/2022 12:15 PM     CBC:    Lab Results   Component Value Date/Time    WBC 4.4 07/18/2022 07:03 AM    RBC 5.48 07/18/2022 07:03 AM    HGB 17.4 07/18/2022 07:03 AM    HCT 54.3 07/18/2022 07:03 AM    MCV 99.1 07/18/2022 07:03 AM    RDW 14.2 07/18/2022 07:03 AM    PLT 70 07/18/2022 07:03 AM     PT/INR:  No results found for: PTINR  PT/INR Warfarin:  No components found for: PTPATWAR, PTINRWAR  PTT:    Lab Results   Component Value Date/Time    APTT 97.0 02/11/2022 07:45 AM     PTT Heparin:  No components found for: APTTHEP  Magnesium:    Lab Results   Component Value Date/Time    MG 1.9 06/28/2021 05:39 AM     TSH:    Lab Results   Component Value Date/Time    TSH 1.730 07/18/2022 12:15 PM     TROPONIN:  No components found for: TROP  BNP:  No results found for: BNP  FASTING LIPID PANEL:    Lab Results   Component Value Date/Time    CHOL 168 08/04/2016 06:27 AM    HDL 51 07/18/2022 12:15 PM    TRIG 187 08/04/2016 06:27 AM     No orders to display     I have personally reviewed the laboratory, cardiac diagnostic and radiographic testing as outlined above:      IMPRESSION:  1: CAD: Status post-PCI to LAD in October 2014 using a 3.0×18 mm drug-eluting stent, doing fine and will continue current treatment. 2: Hypertension: Controlled, continue current medications. 3:  Chronic kidney disease, stage 3 (moderate)              4:  Hyperlipidemia: On Statin                5:  Personal history of pulmonary embolism: On Eliquis      6: Chronic anticoagulation:           RECOMMENDATIONS:   1. Continue current treatment  2.   Consider discontinuing Eliquis since it has been

## 2022-08-16 PROBLEM — R77.8 ELEVATED TROPONIN: Status: RESOLVED | Noted: 2022-07-17 | Resolved: 2022-08-16

## 2022-08-16 PROBLEM — R79.89 ELEVATED TROPONIN: Status: RESOLVED | Noted: 2022-07-17 | Resolved: 2022-08-16

## 2023-03-28 RX ORDER — METOPROLOL SUCCINATE 100 MG/1
TABLET, EXTENDED RELEASE ORAL
Qty: 90 TABLET | Refills: 0 | Status: SHIPPED | OUTPATIENT
Start: 2023-03-28

## 2023-04-21 ENCOUNTER — OFFICE VISIT (OUTPATIENT)
Dept: CARDIOLOGY CLINIC | Age: 78
End: 2023-04-21

## 2023-04-21 VITALS
RESPIRATION RATE: 18 BRPM | WEIGHT: 246 LBS | HEIGHT: 72 IN | HEART RATE: 66 BPM | DIASTOLIC BLOOD PRESSURE: 84 MMHG | SYSTOLIC BLOOD PRESSURE: 128 MMHG | BODY MASS INDEX: 33.32 KG/M2

## 2023-04-21 DIAGNOSIS — I25.10 CORONARY ARTERY DISEASE INVOLVING NATIVE CORONARY ARTERY OF NATIVE HEART WITHOUT ANGINA PECTORIS: Primary | ICD-10-CM

## 2023-04-21 NOTE — PROGRESS NOTES
[facility] Cardiology Progress Note  Dr. Bret Campbell      Referring Physician: Yoselyn Srivastava, APRN - CNP  CHIEF COMPLAINT:   Chief Complaint   Patient presents with    Coronary Artery Disease     9 mo ov. Patient has no cardiac complaints       HISTORY OF PRESENT ILLNESS:   Patient  is 68years old male with PCI to LAD, HTN, hyperlipidemia, thyroid disease, and pulmonary embolism after back surgery is here for follow up appointment. Patient denies any chest pain, no shortness of breath, no lightheadedness, no dizziness, no palpitations, no pedal edema, no PND, no orthopnea, no syncope, no presyncopal episodes.   History of traumatic subdural hematoma  Functional capacity is good for age     Past Medical History:   Diagnosis Date    Bilateral renal cysts 10/13/2014    CAD (coronary artery disease)     Cardiomyopathy (HCC)     Cerebral artery occlusion with cerebral infarction [facility])     Chest pain     8-4-2016 lexiscan stress    Hemorrhagic stroke ([facility] [address]     History of gout 10/13/2014    History of pulmonary embolism 10/13/2014    Hyperlipidemia     Hypertension     Hyperthyroidism 10/13/2014    NSTEMI (non-ST elevated myocardial infarction) ([facility] [address] 10/13/2014    Obesity due to excess calories     Thyroid cyst 10/13/2014    Thyroid disease          Past Surgical History:   Procedure Laterality Date    BACK SURGERY      CARDIOVASCULAR STRESS TEST N/A 07/18/2022    lexiscan stress test    CORONARY ANGIOPLASTY WITH STENT PLACEMENT  [facility]13[facility]2014    [facility]    CRANIOTOMY Right 06/25/2021    RIGHT FRONTAL REMINGTON HOLE TO DRAIN HEMATOMA AND PLACEMENT OF SUBDURAL DRAIN performed by Alex Yin MD at [place] [address] CATH LAB PROCEDURE  10/13/2014    Dr. Court Rey: PCI to LAD    IR IVC FILTER PLACEMENT W IMAGING  02/06/2022    IR IVC FILTER PLACEMENT W IMAGING 2/6/2022 [facility]         Current Outpatient Medications   Medication Sig Dispense Refill    metoprolol

## 2023-05-05 ENCOUNTER — HOSPITAL ENCOUNTER (OUTPATIENT)
Dept: ULTRASOUND IMAGING | Age: 78
Discharge: HOME | End: 2023-05-05
Payer: MEDICARE

## 2023-05-05 DIAGNOSIS — E05.90 HYPERTHYROIDISM: ICD-10-CM

## 2023-05-05 PROCEDURE — 76536 US EXAM OF HEAD AND NECK: CPT

## 2023-05-15 ENCOUNTER — TELEPHONE (OUTPATIENT)
Dept: INTERVENTIONAL RADIOLOGY/VASCULAR | Age: 78
End: 2023-05-15

## 2023-05-15 NOTE — TELEPHONE ENCOUNTER
Spoke with patient and confirmed vascular testing appointment on 05/18/2023 at 1:00 pm.. Instructed patient to arrive 15 minutes prior to appointment time, Enter through Entrance B, register to right of entrance, and report to Cardiac Services for test. Patient verbalized understanding. Questions answered.

## 2023-05-18 ENCOUNTER — HOSPITAL ENCOUNTER (OUTPATIENT)
Dept: INTERVENTIONAL RADIOLOGY/VASCULAR | Age: 78
Discharge: HOME | End: 2023-05-20
Payer: MEDICARE

## 2023-05-18 DIAGNOSIS — R60.9 SWELLING: ICD-10-CM

## 2023-05-18 DIAGNOSIS — R52 PAIN: ICD-10-CM

## 2023-05-18 DIAGNOSIS — Z86.718 HISTORY OF DVT (DEEP VEIN THROMBOSIS): ICD-10-CM

## 2023-05-18 PROCEDURE — 93971 EXTREMITY STUDY: CPT

## 2023-06-26 ENCOUNTER — TRANSCRIBE ORDERS (OUTPATIENT)
Dept: ADMINISTRATIVE | Age: 78
End: 2023-06-26

## 2023-06-26 DIAGNOSIS — E04.1 NONTOXIC SINGLE THYROID NODULE: ICD-10-CM

## 2023-06-26 DIAGNOSIS — R79.89 OTHER SPECIFIED ABNORMAL FINDINGS OF BLOOD CHEMISTRY: Primary | ICD-10-CM

## 2023-07-13 ENCOUNTER — HOSPITAL ENCOUNTER (OUTPATIENT)
Dept: NUCLEAR MEDICINE | Age: 78
Discharge: HOME | End: 2023-07-13
Payer: MEDICARE

## 2023-07-13 ENCOUNTER — HOSPITAL ENCOUNTER (OUTPATIENT)
Dept: ULTRASOUND IMAGING | Age: 78
Discharge: HOME | End: 2023-07-13
Payer: MEDICARE

## 2023-07-13 DIAGNOSIS — E04.1 THYROID NODULE: ICD-10-CM

## 2023-07-13 DIAGNOSIS — R79.89 OTHER SPECIFIED ABNORMAL FINDINGS OF BLOOD CHEMISTRY: ICD-10-CM

## 2023-07-13 DIAGNOSIS — E04.1 NONTOXIC SINGLE THYROID NODULE: ICD-10-CM

## 2023-07-13 PROCEDURE — 3430000000 HC RX DIAGNOSTIC RADIOPHARMACEUTICAL: Performed by: RADIOLOGY

## 2023-07-13 PROCEDURE — 88173 CYTOPATH EVAL FNA REPORT: CPT

## 2023-07-13 PROCEDURE — 10005 FNA BX W/US GDN 1ST LES: CPT

## 2023-07-13 PROCEDURE — 78014 THYROID IMAGING W/BLOOD FLOW: CPT

## 2023-07-13 PROCEDURE — 88305 TISSUE EXAM BY PATHOLOGIST: CPT

## 2023-07-13 PROCEDURE — A9516 IODINE I-123 SOD IODIDE MIC: HCPCS | Performed by: RADIOLOGY

## 2023-07-13 RX ORDER — SODIUM IODIDE I 123 100 UCI/1
200 CAPSULE, GELATIN COATED ORAL ONCE
Status: COMPLETED | OUTPATIENT
Start: 2023-07-13 | End: 2023-07-13

## 2023-07-13 RX ADMIN — SODIUM IODIDE I 123 200 MICRO CURIE: 100 CAPSULE, GELATIN COATED ORAL at 13:54

## 2023-07-14 ENCOUNTER — HOSPITAL ENCOUNTER (OUTPATIENT)
Dept: NUCLEAR MEDICINE | Age: 78
Discharge: HOME | End: 2023-07-14
Payer: MEDICARE

## 2023-08-06 ENCOUNTER — HOSPITAL ENCOUNTER (EMERGENCY)
Age: 78
Discharge: HOME | End: 2023-08-06
Attending: STUDENT IN AN ORGANIZED HEALTH CARE EDUCATION/TRAINING PROGRAM
Payer: MEDICARE

## 2023-08-06 ENCOUNTER — APPOINTMENT (OUTPATIENT)
Dept: GENERAL RADIOLOGY | Age: 78
End: 2023-08-06
Payer: MEDICARE

## 2023-08-06 VITALS
BODY MASS INDEX: 33.23 KG/M2 | WEIGHT: 245 LBS | HEART RATE: 54 BPM | DIASTOLIC BLOOD PRESSURE: 75 MMHG | SYSTOLIC BLOOD PRESSURE: 165 MMHG | OXYGEN SATURATION: 100 % | RESPIRATION RATE: 18 BRPM | TEMPERATURE: 97.3 F

## 2023-08-06 DIAGNOSIS — M79.671 RIGHT FOOT PAIN: Primary | ICD-10-CM

## 2023-08-06 PROCEDURE — 99284 EMERGENCY DEPT VISIT MOD MDM: CPT

## 2023-08-06 PROCEDURE — 73630 X-RAY EXAM OF FOOT: CPT

## 2023-08-06 PROCEDURE — 96372 THER/PROPH/DIAG INJ SC/IM: CPT

## 2023-08-06 PROCEDURE — 6360000002 HC RX W HCPCS

## 2023-08-06 RX ORDER — KETOROLAC TROMETHAMINE 15 MG/ML
15 INJECTION, SOLUTION INTRAMUSCULAR; INTRAVENOUS ONCE
Status: COMPLETED | OUTPATIENT
Start: 2023-08-06 | End: 2023-08-06

## 2023-08-06 RX ORDER — ACETAMINOPHEN 500 MG
500 TABLET ORAL 4 TIMES DAILY PRN
Qty: 120 TABLET | Refills: 0 | Status: SHIPPED | OUTPATIENT
Start: 2023-08-06

## 2023-08-06 RX ADMIN — KETOROLAC TROMETHAMINE 15 MG: 15 INJECTION, SOLUTION INTRAMUSCULAR; INTRAVENOUS at 11:31

## 2023-08-06 ASSESSMENT — PAIN - FUNCTIONAL ASSESSMENT: PAIN_FUNCTIONAL_ASSESSMENT: 0-10

## 2023-08-06 NOTE — ED PROVIDER NOTES
[address]      Pt Name: Sanjuanita Boone  MRN: [id number]  [address] [facility] 1945  Date of evaluation: 8/6/2023  Provider: Marcus Mota DO  PCP: LOLA Weir CNP  Note Started: 11:18 AM EDT 8/6/23    CHIEF COMPLAINT       Chief Complaint   Patient presents with    Foot Pain     Hx gout and clot       HISTORY OF PRESENT ILLNESS: 1 or more Elements   History From: Patient  Limitations to history : None    Sanjuanita Boone is a 66 y.o. male who presents right midfoot pain. Patient states that he injured her foot about a month ago when they slipped on a rug and slammed her foot down to stop himself from falling. Patient states that the pain has been getting better over time however this morning they woke up and were in excruciating pain. Patient states that they were unable to walk this morning on the right foot. Patient notes that they have have some swelling in the foot goes up into the ankle. Patient does have a history of gout and blood clots on Eliquis. Patient denies any chest pain, shortness of breath, dizziness, fever, chills. Nursing Notes were all reviewed and agreed with or any disagreements were addressed in the HPI. REVIEW OF SYSTEMS :    Positives and Pertinent negatives as per HPI.      SURGICAL HISTORY     Past Surgical History:   Procedure Laterality Date    BACK SURGERY      CARDIOVASCULAR STRESS TEST N/A 07/18/2022    lexiscan stress test    CORONARY ANGIOPLASTY WITH STENT PLACEMENT  10/13/2014    Dr. Carin Baker [place] 3.0x18 MID LAD    CRANIOTOMY Right 06/25/2021    RIGHT FRONTAL REMINGTON HOLE TO DRAIN HEMATOMA AND PLACEMENT OF SUBDURAL DRAIN performed by Arcadio Rubi MD at [address] [facility] CATH LAB PROCEDURE  10/13/2014    Dr. Carin Payne: PCI to LAD    IR IVC FILTER PLACEMENT W IMAGING  02/06/2022    IR IVC FILTER PLACEMENT W IMAGING 2/6/2022 [address]

## 2024-07-07 ENCOUNTER — APPOINTMENT (OUTPATIENT)
Dept: ULTRASOUND IMAGING | Age: 79
End: 2024-07-07
Payer: MEDICARE

## 2024-07-07 ENCOUNTER — HOSPITAL ENCOUNTER (EMERGENCY)
Age: 79
Discharge: HOME | End: 2024-07-07
Attending: EMERGENCY MEDICINE
Payer: MEDICARE

## 2024-07-07 VITALS
TEMPERATURE: 98.2 F | DIASTOLIC BLOOD PRESSURE: 90 MMHG | WEIGHT: 245 LBS | BODY MASS INDEX: 33.18 KG/M2 | HEART RATE: 70 BPM | OXYGEN SATURATION: 96 % | SYSTOLIC BLOOD PRESSURE: 197 MMHG | HEIGHT: 72 IN | RESPIRATION RATE: 18 BRPM

## 2024-07-07 DIAGNOSIS — M79.89 LEG SWELLING: Primary | ICD-10-CM

## 2024-07-07 PROCEDURE — 93971 EXTREMITY STUDY: CPT

## 2024-07-07 PROCEDURE — 99284 EMERGENCY DEPT VISIT MOD MDM: CPT

## 2024-07-07 ASSESSMENT — LIFESTYLE VARIABLES
HOW OFTEN DO YOU HAVE A DRINK CONTAINING ALCOHOL: MONTHLY OR LESS
HOW MANY STANDARD DRINKS CONTAINING ALCOHOL DO YOU HAVE ON A TYPICAL DAY: 1 OR 2

## 2024-07-08 NOTE — ED PROVIDER NOTES
78-year-old male presenting with right ankle and lower leg swelling.  He says it started the last hours.  He has a history of DVTs but he says he is taking his Eliquis appropriately.  No shortness of breath, no chest pain, no lightheadedness or dizziness.  No fevers or chills.         Family History   Problem Relation Age of Onset    Heart Disease Mother         cabg 3    Heart Disease Father         pacemaker     Past Surgical History:   Procedure Laterality Date    BACK SURGERY      CARDIOVASCULAR STRESS TEST N/A 07/18/2022    lexiscan stress test    CORONARY ANGIOPLASTY WITH STENT PLACEMENT  10/13/2014    Dr. Cowart- MARISA Xpedition 3.0x18 MID LAD    CRANIOTOMY Right 06/25/2021    RIGHT FRONTAL REMINGTON HOLE TO DRAIN HEMATOMA AND PLACEMENT OF SUBDURAL DRAIN performed by Lars Garcia MD at [facility]    DIAGNOSTIC CARDIAC CATH LAB PROCEDURE  10/13/2014    Dr. Cowart: PCI to LAD    IR IVC FILTER PLACEMENT W IMAGING  02/06/2022    IR IVC FILTER PLACEMENT W IMAGING 2/6/2022 [facility] PROC[facility]       Review of Systems   Constitutional:  Negative for chills and fever.   Respiratory:  Negative for chest tightness and shortness of breath.    Cardiovascular:  Positive for leg swelling. Negative for chest pain.        Physical Exam  Constitutional:       General: He is not in acute distress.     Appearance: He is well-developed.   HENT:      Head: Normocephalic and atraumatic.   Eyes:      Pupils: Pupils are equal, round, and reactive to light.   Neck:      Thyroid: No thyromegaly.   Cardiovascular:      Rate and Rhythm: Normal rate and regular rhythm.   Pulmonary:      Effort: Pulmonary effort is normal. No respiratory distress.      Breath sounds: Normal breath sounds. No wheezing.   Abdominal:      General: There is no distension.      Palpations: Abdomen is soft. There is no mass.      Tenderness: There is no abdominal tenderness. There is no guarding or rebound.   Musculoskeletal:         General: No tenderness.

## 2024-11-08 ENCOUNTER — HOSPITAL ENCOUNTER (OUTPATIENT)
Dept: ULTRASOUND IMAGING | Age: 79
Discharge: HOME | End: 2024-11-08
Payer: MEDICARE

## 2024-11-08 DIAGNOSIS — N28.9 ABNORMAL RENAL FUNCTION: ICD-10-CM

## 2024-11-08 PROCEDURE — 76770 US EXAM ABDO BACK WALL COMP: CPT

## 2024-11-14 ENCOUNTER — TRANSCRIBE ORDERS (OUTPATIENT)
Dept: ADMINISTRATIVE | Age: 79
End: 2024-11-14

## 2024-11-14 DIAGNOSIS — N28.89 KIDNEY MASS: ICD-10-CM

## 2024-11-14 DIAGNOSIS — R93.89 ABNORMAL ULTRASOUND: Primary | ICD-10-CM

## 2024-11-20 ENCOUNTER — HOSPITAL ENCOUNTER (OUTPATIENT)
Dept: CT IMAGING | Age: 79
Discharge: HOME | End: 2024-11-20
Payer: MEDICARE

## 2024-11-20 DIAGNOSIS — R93.89 ABNORMAL ULTRASOUND: ICD-10-CM

## 2024-11-20 DIAGNOSIS — N28.89 KIDNEY MASS: ICD-10-CM

## 2024-11-20 PROCEDURE — 6360000004 HC RX CONTRAST MEDICATION: Performed by: RADIOLOGY

## 2024-11-20 PROCEDURE — 74170 CT ABD WO CNTRST FLWD CNTRST: CPT

## 2024-11-20 RX ORDER — IOPAMIDOL 755 MG/ML
75 INJECTION, SOLUTION INTRAVASCULAR
Status: COMPLETED | OUTPATIENT
Start: 2024-11-20 | End: 2024-11-20

## 2024-11-20 RX ADMIN — IOPAMIDOL 75 ML: 755 INJECTION, SOLUTION INTRAVENOUS at 17:31

## 2025-02-12 ENCOUNTER — HOSPITAL ENCOUNTER (EMERGENCY)
Age: 80
Discharge: HOME | End: 2025-02-12
Attending: EMERGENCY MEDICINE
Payer: MEDICARE

## 2025-02-12 VITALS
OXYGEN SATURATION: 94 % | HEART RATE: 64 BPM | RESPIRATION RATE: 18 BRPM | SYSTOLIC BLOOD PRESSURE: 165 MMHG | TEMPERATURE: 97 F | DIASTOLIC BLOOD PRESSURE: 81 MMHG

## 2025-02-12 DIAGNOSIS — Z71.1 NO PROBLEM, FEARED COMPLAINT UNFOUNDED: ICD-10-CM

## 2025-02-12 DIAGNOSIS — N50.1 SCROTAL BLEEDING: Primary | ICD-10-CM

## 2025-02-12 PROCEDURE — 99282 EMERGENCY DEPT VISIT SF MDM: CPT

## 2025-02-12 NOTE — ED PROVIDER NOTES
Clinician of Record.    FINAL IMPRESSION      1. Scrotal bleeding    2. No problem, feared complaint unfounded          DISPOSITION/PLAN     DISPOSITION Decision To Discharge 02/12/2025 12:22:48 PM      PATIENT REFERRED TO:  Komal Friend APRN - CNP  [address] [place], [place] [id number]  [phone]    Call   If symptoms worsen return      DISCHARGE MEDICATIONS:  New Prescriptions    No medications on file       DISCONTINUED MEDICATIONS:  Discontinued Medications    No medications on file              (Please note that portions of this note were completed with a voice recognition program.  Efforts were made to edit the dictations but occasionally words are mis-transcribed.)    Raphael Conteh DO (electronically signed)           Raphael Conteh DO  02/12/25 [id number]

## 2025-06-13 ENCOUNTER — HOSPITAL ENCOUNTER (OUTPATIENT)
Age: 80
Discharge: HOME | End: 2025-06-13
Payer: MEDICARE

## 2025-06-13 LAB
25(OH)D3 SERPL-MCNC: 27.1 NG/ML (ref 30–100)
ALBUMIN SERPL-MCNC: 3.5 G/DL (ref 3.5–5.2)
ALP SERPL-CCNC: 110 U/L (ref 40–129)
ALT SERPL-CCNC: 42 U/L (ref 0–50)
ANION GAP SERPL CALCULATED.3IONS-SCNC: 10 MMOL/L (ref 7–16)
AST SERPL-CCNC: 51 U/L (ref 0–50)
BILIRUB SERPL-MCNC: 0.8 MG/DL (ref 0–1.2)
BUN SERPL-MCNC: 34 MG/DL (ref 8–23)
C3 SERPL-MCNC: 128 MG/DL (ref 90–180)
C4 SERPL-MCNC: 15 MG/DL (ref 10–40)
CALCIUM SERPL-MCNC: 9 MG/DL (ref 8.8–10.2)
CHLORIDE SERPL-SCNC: 110 MMOL/L (ref 98–107)
CO2 SERPL-SCNC: 21 MMOL/L (ref 22–29)
CREAT SERPL-MCNC: 1.6 MG/DL (ref 0.7–1.2)
CREAT UR-MCNC: 79.6 MG/DL (ref 40–278)
CREAT UR-MCNC: 79.6 MG/DL (ref 40–278)
ERYTHROCYTE [DISTWIDTH] IN BLOOD BY AUTOMATED COUNT: 14.1 % (ref 11.5–15)
GFR, ESTIMATED: 44 ML/MIN/1.73M2
GLUCOSE SERPL-MCNC: 98 MG/DL (ref 74–99)
HCT VFR BLD AUTO: 48.5 % (ref 37–54)
HGB BLD-MCNC: 16.2 G/DL (ref 12.5–16.5)
MCH RBC QN AUTO: 33.2 PG (ref 26–35)
MCHC RBC AUTO-ENTMCNC: 33.4 G/DL (ref 32–34.5)
MCV RBC AUTO: 99.4 FL (ref 80–99.9)
MICROALBUMIN UR-MCNC: 1708 MG/L (ref 0–20)
MICROALBUMIN/CREAT UR-RTO: 2146 MCG/MG CREAT (ref 0–30)
PHOSPHATE SERPL-MCNC: 2.8 MG/DL (ref 2.5–4.5)
PLATELET CONFIRMATION: NORMAL
PLATELET, FLUORESCENCE: 66 K/UL (ref 130–450)
PMV BLD AUTO: 11.5 FL (ref 7–12)
POTASSIUM SERPL-SCNC: 4.6 MMOL/L (ref 3.5–5.1)
PROT SERPL-MCNC: 6.1 G/DL (ref 6.4–8.3)
PTH-INTACT SERPL-MCNC: 63 PG/ML (ref 15–65)
RBC # BLD AUTO: 4.88 M/UL (ref 3.8–5.8)
SODIUM SERPL-SCNC: 140 MMOL/L (ref 136–145)
TOTAL PROTEIN, URINE: 261 MG/DL (ref 0–12)
URATE SERPL-MCNC: 3.9 MG/DL (ref 3.4–7)
URINE TOTAL PROTEIN CREATININE RATIO: 3.28 (ref 0–0.2)
WBC OTHER # BLD: 4.6 K/UL (ref 4.5–11.5)

## 2025-06-13 PROCEDURE — 84550 ASSAY OF BLOOD/URIC ACID: CPT

## 2025-06-13 PROCEDURE — 84156 ASSAY OF PROTEIN URINE: CPT

## 2025-06-13 PROCEDURE — 83970 ASSAY OF PARATHORMONE: CPT

## 2025-06-13 PROCEDURE — 86255 FLUORESCENT ANTIBODY SCREEN: CPT

## 2025-06-13 PROCEDURE — 82570 ASSAY OF URINE CREATININE: CPT

## 2025-06-13 PROCEDURE — 36415 COLL VENOUS BLD VENIPUNCTURE: CPT

## 2025-06-13 PROCEDURE — 86038 ANTINUCLEAR ANTIBODIES: CPT

## 2025-06-13 PROCEDURE — 86160 COMPLEMENT ANTIGEN: CPT

## 2025-06-13 PROCEDURE — 84100 ASSAY OF PHOSPHORUS: CPT

## 2025-06-13 PROCEDURE — 85027 COMPLETE CBC AUTOMATED: CPT

## 2025-06-13 PROCEDURE — 80053 COMPREHEN METABOLIC PANEL: CPT

## 2025-06-13 PROCEDURE — 82306 VITAMIN D 25 HYDROXY: CPT

## 2025-06-13 PROCEDURE — 83516 IMMUNOASSAY NONANTIBODY: CPT

## 2025-06-13 PROCEDURE — 82043 UR ALBUMIN QUANTITATIVE: CPT

## 2025-06-16 LAB
ANA SER QL IA: NEGATIVE
ANCA MYELOPEROXIDASE: <0.3 AU/ML (ref 0–3.5)
ANCA PROTEINASE 3: <0.7 AU/ML (ref 0–2)
DEPRECATED S PNEUM 1 IGG SER-MCNC: 0 AU/ML (ref 0–19)
PLA2R IGG SERPL QL IF: NORMAL
SERINE PROTEASE 3, IGG: 1 AU/ML (ref 0–19)

## 2025-08-14 ENCOUNTER — TELEPHONE (OUTPATIENT)
Dept: INTERVENTIONAL RADIOLOGY/VASCULAR | Age: 80
End: 2025-08-14

## 2025-08-19 ENCOUNTER — APPOINTMENT (OUTPATIENT)
Dept: CT IMAGING | Age: 80
End: 2025-08-19
Payer: MEDICARE

## 2025-08-19 ENCOUNTER — HOSPITAL ENCOUNTER (EMERGENCY)
Age: 80
Discharge: HOME | End: 2025-08-19
Attending: EMERGENCY MEDICINE
Payer: MEDICARE

## 2025-08-19 VITALS
OXYGEN SATURATION: 98 % | HEART RATE: 67 BPM | RESPIRATION RATE: 16 BRPM | DIASTOLIC BLOOD PRESSURE: 66 MMHG | TEMPERATURE: 98 F | SYSTOLIC BLOOD PRESSURE: 125 MMHG

## 2025-08-19 DIAGNOSIS — R56.9 SEIZURE-LIKE ACTIVITY (HCC): Primary | ICD-10-CM

## 2025-08-19 LAB
ALBUMIN SERPL-MCNC: 3.3 G/DL (ref 3.5–5.2)
ALP SERPL-CCNC: 111 U/L (ref 40–129)
ALT SERPL-CCNC: 50 U/L (ref 0–50)
ANION GAP SERPL CALCULATED.3IONS-SCNC: 8 MMOL/L (ref 7–16)
AST SERPL-CCNC: 58 U/L (ref 0–50)
BASOPHILS # BLD: 0 K/UL (ref 0–0.2)
BASOPHILS NFR BLD: 0 % (ref 0–2)
BILIRUB SERPL-MCNC: 1.1 MG/DL (ref 0–1.2)
BUN SERPL-MCNC: 24 MG/DL (ref 8–23)
CALCIUM SERPL-MCNC: 9.2 MG/DL (ref 8.8–10.2)
CHLORIDE SERPL-SCNC: 109 MMOL/L (ref 98–107)
CO2 SERPL-SCNC: 24 MMOL/L (ref 22–29)
CREAT SERPL-MCNC: 1.7 MG/DL (ref 0.7–1.2)
EOSINOPHIL # BLD: 0.27 K/UL (ref 0.05–0.5)
EOSINOPHILS RELATIVE PERCENT: 6 % (ref 0–6)
ERYTHROCYTE [DISTWIDTH] IN BLOOD BY AUTOMATED COUNT: 13.9 % (ref 11.5–15)
GFR, ESTIMATED: 41 ML/MIN/1.73M2
GLUCOSE BLD-MCNC: 102 MG/DL (ref 74–99)
GLUCOSE SERPL-MCNC: 100 MG/DL (ref 74–99)
HCT VFR BLD AUTO: 47.2 % (ref 37–54)
HGB BLD-MCNC: 15.5 G/DL (ref 12.5–16.5)
LYMPHOCYTES NFR BLD: 0.39 K/UL (ref 1.5–4)
LYMPHOCYTES RELATIVE PERCENT: 9 % (ref 20–42)
MAGNESIUM SERPL-MCNC: 1.8 MG/DL (ref 1.6–2.4)
MCH RBC QN AUTO: 33 PG (ref 26–35)
MCHC RBC AUTO-ENTMCNC: 32.8 G/DL (ref 32–34.5)
MCV RBC AUTO: 100.4 FL (ref 80–99.9)
MONOCYTES NFR BLD: 0.27 K/UL (ref 0.1–0.95)
MONOCYTES NFR BLD: 6 % (ref 2–12)
NEUTROPHILS NFR BLD: 79 % (ref 43–80)
NEUTS SEG NFR BLD: 3.47 K/UL (ref 1.8–7.3)
NUCLEATED RED BLOOD CELLS: 1 PER 100 WBC
PLATELET CONFIRMATION: NORMAL
PLATELET, FLUORESCENCE: 64 K/UL (ref 130–450)
PMV BLD AUTO: 11.5 FL (ref 7–12)
POTASSIUM SERPL-SCNC: 3.9 MMOL/L (ref 3.5–5.1)
PROT SERPL-MCNC: 6.1 G/DL (ref 6.4–8.3)
RBC # BLD AUTO: 4.7 M/UL (ref 3.8–5.8)
RBC # BLD: ABNORMAL 10*6/UL
RBC # BLD: ABNORMAL 10*6/UL
SODIUM SERPL-SCNC: 141 MMOL/L (ref 136–145)
WBC OTHER # BLD: 4.4 K/UL (ref 4.5–11.5)

## 2025-08-19 PROCEDURE — 83735 ASSAY OF MAGNESIUM: CPT

## 2025-08-19 PROCEDURE — 70450 CT HEAD/BRAIN W/O DYE: CPT

## 2025-08-19 PROCEDURE — 93005 ELECTROCARDIOGRAM TRACING: CPT

## 2025-08-19 PROCEDURE — 85025 COMPLETE CBC W/AUTO DIFF WBC: CPT

## 2025-08-19 PROCEDURE — 80053 COMPREHEN METABOLIC PANEL: CPT

## 2025-08-19 PROCEDURE — 6360000002 HC RX W HCPCS

## 2025-08-19 PROCEDURE — 99284 EMERGENCY DEPT VISIT MOD MDM: CPT

## 2025-08-19 PROCEDURE — 82962 GLUCOSE BLOOD TEST: CPT

## 2025-08-19 PROCEDURE — 2580000003 HC RX 258

## 2025-08-19 PROCEDURE — 96374 THER/PROPH/DIAG INJ IV PUSH: CPT

## 2025-08-19 RX ORDER — 0.9 % SODIUM CHLORIDE 0.9 %
1000 INTRAVENOUS SOLUTION INTRAVENOUS ONCE
Status: COMPLETED | OUTPATIENT
Start: 2025-08-19 | End: 2025-08-19

## 2025-08-19 RX ORDER — LEVETIRACETAM 500 MG/5ML
1500 INJECTION, SOLUTION, CONCENTRATE INTRAVENOUS ONCE
Status: COMPLETED | OUTPATIENT
Start: 2025-08-19 | End: 2025-08-19

## 2025-08-19 RX ADMIN — SODIUM CHLORIDE 1000 ML: 0.9 INJECTION, SOLUTION INTRAVENOUS at 18:24

## 2025-08-19 RX ADMIN — LEVETIRACETAM 1500 MG: 100 INJECTION INTRAVENOUS at 16:56

## 2025-08-19 ASSESSMENT — LIFESTYLE VARIABLES
HOW MANY STANDARD DRINKS CONTAINING ALCOHOL DO YOU HAVE ON A TYPICAL DAY: PATIENT DOES NOT DRINK
HOW OFTEN DO YOU HAVE A DRINK CONTAINING ALCOHOL: NEVER

## 2025-08-20 LAB
EKG ATRIAL RATE: 71 BPM
EKG P-R INTERVAL: 304 MS
EKG Q-T INTERVAL: 412 MS
EKG QRS DURATION: 104 MS
EKG QTC CALCULATION (BAZETT): 447 MS
EKG R AXIS: -2 DEGREES
EKG T AXIS: 50 DEGREES
EKG VENTRICULAR RATE: 71 BPM

## 2025-08-20 PROCEDURE — 93010 ELECTROCARDIOGRAM REPORT: CPT | Performed by: INTERNAL MEDICINE

## 2025-09-05 ENCOUNTER — HOSPITAL ENCOUNTER (OUTPATIENT)
Dept: LAB | Age: 80
Discharge: HOME | End: 2025-09-05
Payer: MEDICARE

## 2025-09-05 LAB
25(OH)D3 SERPL-MCNC: 30.6 NG/ML (ref 30–100)
ALBUMIN SERPL-MCNC: 3.7 G/DL (ref 3.5–5.2)
ALP SERPL-CCNC: 122 U/L (ref 40–129)
ALT SERPL-CCNC: 49 U/L (ref 0–50)
ANION GAP SERPL CALCULATED.3IONS-SCNC: 12 MMOL/L (ref 7–16)
AST SERPL-CCNC: 60 U/L (ref 0–50)
BILIRUB SERPL-MCNC: 1.5 MG/DL (ref 0–1.2)
BUN SERPL-MCNC: 24 MG/DL (ref 8–23)
CALCIUM SERPL-MCNC: 9.6 MG/DL (ref 8.8–10.2)
CHLORIDE SERPL-SCNC: 104 MMOL/L (ref 98–107)
CO2 SERPL-SCNC: 25 MMOL/L (ref 22–29)
CREAT SERPL-MCNC: 1.7 MG/DL (ref 0.7–1.2)
CREAT UR-MCNC: 54 MG/DL (ref 40–278)
CREAT UR-MCNC: 57.2 MG/DL (ref 40–278)
ERYTHROCYTE [DISTWIDTH] IN BLOOD BY AUTOMATED COUNT: 14 % (ref 11.5–15)
GFR, ESTIMATED: 41 ML/MIN/1.73M2
GLUCOSE SERPL-MCNC: 98 MG/DL (ref 74–99)
HCT VFR BLD AUTO: 49.8 % (ref 37–54)
HGB BLD-MCNC: 16.4 G/DL (ref 12.5–16.5)
MCH RBC QN AUTO: 32.7 PG (ref 26–35)
MCHC RBC AUTO-ENTMCNC: 32.9 G/DL (ref 32–34.5)
MCV RBC AUTO: 99.4 FL (ref 80–99.9)
MICROALBUMIN UR-MCNC: 1177 MG/L (ref 0–20)
MICROALBUMIN/CREAT UR-RTO: 2058 MCG/MG CREAT (ref 0–30)
PHOSPHATE SERPL-MCNC: 3.1 MG/DL (ref 2.5–4.5)
PLATELET CONFIRMATION: NORMAL
PLATELET, FLUORESCENCE: 61 K/UL (ref 130–450)
PMV BLD AUTO: 11.6 FL (ref 7–12)
POTASSIUM SERPL-SCNC: 4.6 MMOL/L (ref 3.5–5.1)
PROT SERPL-MCNC: 6.7 G/DL (ref 6.4–8.3)
PTH-INTACT SERPL-MCNC: 53 PG/ML (ref 15–65)
RBC # BLD AUTO: 5.01 M/UL (ref 3.8–5.8)
SODIUM SERPL-SCNC: 140 MMOL/L (ref 136–145)
TOTAL PROTEIN, URINE: 166 MG/DL (ref 0–12)
URINE TOTAL PROTEIN CREATININE RATIO: 3.07 (ref 0–0.2)
WBC OTHER # BLD: 4.9 K/UL (ref 4.5–11.5)

## 2025-09-05 PROCEDURE — 84100 ASSAY OF PHOSPHORUS: CPT

## 2025-09-05 PROCEDURE — 80053 COMPREHEN METABOLIC PANEL: CPT

## 2025-09-05 PROCEDURE — 84156 ASSAY OF PROTEIN URINE: CPT

## 2025-09-05 PROCEDURE — 85027 COMPLETE CBC AUTOMATED: CPT

## 2025-09-05 PROCEDURE — 82306 VITAMIN D 25 HYDROXY: CPT

## 2025-09-05 PROCEDURE — 82570 ASSAY OF URINE CREATININE: CPT

## 2025-09-05 PROCEDURE — 83970 ASSAY OF PARATHORMONE: CPT

## 2025-09-05 PROCEDURE — 82043 UR ALBUMIN QUANTITATIVE: CPT

## (undated) DEVICE — BLADE CLP TAPR HD WET DRY CAPABILITY GTT IN CHARGING USE

## (undated) DEVICE — SYRINGE,TOOMEY,IRRIGATION,70CC,STERILE: Brand: MEDLINE

## (undated) DEVICE — 1.5L THIN WALL CAN: Brand: CRD

## (undated) DEVICE — GLOVE SURG 8.5 PF POLYMER WHT STRL SIGN LTX ESSENTIAL LTX

## (undated) DEVICE — APPLICATOR PREP 26ML 0.7% IOD POVACRYLEX 74% ISO ALC ST

## (undated) DEVICE — STANDARD HYPODERMIC NEEDLE,ALUMINUM HUB: Brand: MONOJECT

## (undated) DEVICE — PAD,NON-ADHERENT,2X3,STERILE,LF,1/PK: Brand: MEDLINE

## (undated) DEVICE — HERMETIC™ LARGE-STYLE VENTRICULAR CATHETER SET: Brand: HERMETIC™

## (undated) DEVICE — DRAINAGE ACCESSORY KIT: 500 ML DRAINAGE BAG FOR INTRAOPERATIVE DRAINAGE OF CEREBROSPINAL FLUID.: Brand: DRAINAGE ACCESSORY KIT

## (undated) DEVICE — DRESSING TRNSPAR W4XL55IN ACRYL SUP FLM W ADH WTRPRF OPSITE

## (undated) DEVICE — SURGICAL PROCEDURE PACK CRANIOTOMY CUST

## (undated) DEVICE — Z INACTIVE USE 2653177 SPONGE GZ W2XL2IN NONWOVEN 4 PLY FASTER WICKING ABIL AVANT

## (undated) DEVICE — GOWN,SIRUS,FABRNF,L,20/CS: Brand: MEDLINE

## (undated) DEVICE — 6.0MM X-COARSE DIAMOND

## (undated) DEVICE — SYRINGE,EAR/ULCER, 3 OZ, STERILE: Brand: MEDLINE

## (undated) DEVICE — TAPE ADH W2INXL10YD WHT PAPR GENTLE BRTH FLX COMFORTABLE

## (undated) DEVICE — CATHETER URETH 10FR RED RUB INTMIT ALL PURP 12 PER CA

## (undated) DEVICE — SYRINGE 20ML LL S/C 50

## (undated) DEVICE — PERFORATOR CRAN AD PED 14/11MM DGR O ROUNDED CUT EDGE DISP

## (undated) DEVICE — INTENDED FOR TISSUE SEPARATION, AND OTHER PROCEDURES THAT REQUIRE A SHARP SURGICAL BLADE TO PUNCTURE OR CUT.: Brand: BARD-PARKER ® STAINLESS STEEL BLADES

## (undated) DEVICE — DOUBLE BASIN SET: Brand: MEDLINE INDUSTRIES, INC.

## (undated) DEVICE — SOLUTION IV IRRIG WATER 1000ML POUR BRL 2F7114

## (undated) DEVICE — GOWN,SIRUS,FABRNF,2XL,18/CS: Brand: MEDLINE

## (undated) DEVICE — LABEL MED 4 IN SURG PANEL W/ PEN STRL